# Patient Record
Sex: MALE | Race: WHITE | NOT HISPANIC OR LATINO | URBAN - METROPOLITAN AREA
[De-identification: names, ages, dates, MRNs, and addresses within clinical notes are randomized per-mention and may not be internally consistent; named-entity substitution may affect disease eponyms.]

---

## 2021-01-23 DIAGNOSIS — Z23 ENCOUNTER FOR IMMUNIZATION: ICD-10-CM

## 2021-02-06 ENCOUNTER — IMMUNIZATIONS (OUTPATIENT)
Dept: FAMILY MEDICINE CLINIC | Facility: HOSPITAL | Age: 61
End: 2021-02-06

## 2021-02-06 DIAGNOSIS — Z23 ENCOUNTER FOR IMMUNIZATION: Primary | ICD-10-CM

## 2021-02-06 PROCEDURE — 91301 SARS-COV-2 / COVID-19 MRNA VACCINE (MODERNA) 100 MCG: CPT

## 2021-02-06 PROCEDURE — 0011A SARS-COV-2 / COVID-19 MRNA VACCINE (MODERNA) 100 MCG: CPT

## 2021-03-06 ENCOUNTER — IMMUNIZATIONS (OUTPATIENT)
Dept: FAMILY MEDICINE CLINIC | Facility: HOSPITAL | Age: 61
End: 2021-03-06

## 2021-03-06 DIAGNOSIS — Z23 ENCOUNTER FOR IMMUNIZATION: Primary | ICD-10-CM

## 2021-03-06 PROCEDURE — 91301 SARS-COV-2 / COVID-19 MRNA VACCINE (MODERNA) 100 MCG: CPT

## 2021-03-06 PROCEDURE — 0012A SARS-COV-2 / COVID-19 MRNA VACCINE (MODERNA) 100 MCG: CPT

## 2021-11-01 ENCOUNTER — IMMUNIZATIONS (OUTPATIENT)
Dept: FAMILY MEDICINE CLINIC | Facility: HOSPITAL | Age: 61
End: 2021-11-01

## 2021-11-01 DIAGNOSIS — Z23 ENCOUNTER FOR IMMUNIZATION: Primary | ICD-10-CM

## 2022-08-29 ENCOUNTER — APPOINTMENT (OUTPATIENT)
Dept: LAB | Facility: HOSPITAL | Age: 62
End: 2022-08-29

## 2022-08-29 DIAGNOSIS — Z01.84 IMMUNITY STATUS TESTING: ICD-10-CM

## 2022-08-29 DIAGNOSIS — Z11.1 SCREENING EXAMINATION FOR PULMONARY TUBERCULOSIS: ICD-10-CM

## 2022-08-29 PROCEDURE — 86765 RUBEOLA ANTIBODY: CPT

## 2022-08-29 PROCEDURE — 86762 RUBELLA ANTIBODY: CPT

## 2022-08-29 PROCEDURE — 86735 MUMPS ANTIBODY: CPT

## 2022-08-29 PROCEDURE — 86787 VARICELLA-ZOSTER ANTIBODY: CPT

## 2022-08-29 PROCEDURE — 86480 TB TEST CELL IMMUN MEASURE: CPT

## 2022-08-29 PROCEDURE — 36415 COLL VENOUS BLD VENIPUNCTURE: CPT

## 2022-08-30 LAB
MEV IGG SER QL IA: NORMAL
MUV IGG SER QL IA: NORMAL
RUBV IGG SERPL IA-ACNC: >175 IU/ML
VZV IGG SER QL IA: NORMAL

## 2022-08-31 LAB
GAMMA INTERFERON BACKGROUND BLD IA-ACNC: 0.06 IU/ML
M TB IFN-G BLD-IMP: POSITIVE
M TB IFN-G CD4+ BCKGRND COR BLD-ACNC: 1.34 IU/ML
M TB IFN-G CD4+ BCKGRND COR BLD-ACNC: 1.41 IU/ML
MITOGEN IGNF BCKGRD COR BLD-ACNC: 5.64 IU/ML

## 2022-09-02 ENCOUNTER — APPOINTMENT (OUTPATIENT)
Dept: LAB | Facility: HOSPITAL | Age: 62
End: 2022-09-02

## 2022-09-02 DIAGNOSIS — Z11.1 SCREENING EXAMINATION FOR PULMONARY TUBERCULOSIS: ICD-10-CM

## 2022-09-02 PROCEDURE — 36415 COLL VENOUS BLD VENIPUNCTURE: CPT

## 2022-09-02 PROCEDURE — 86480 TB TEST CELL IMMUN MEASURE: CPT

## 2022-09-06 LAB
GAMMA INTERFERON BACKGROUND BLD IA-ACNC: 0.05 IU/ML
M TB IFN-G BLD-IMP: POSITIVE
M TB IFN-G CD4+ BCKGRND COR BLD-ACNC: 3.12 IU/ML
M TB IFN-G CD4+ BCKGRND COR BLD-ACNC: 3.46 IU/ML
MITOGEN IGNF BCKGRD COR BLD-ACNC: 9.62 IU/ML

## 2022-09-13 ENCOUNTER — OFFICE VISIT (OUTPATIENT)
Dept: URGENT CARE | Facility: CLINIC | Age: 62
End: 2022-09-13
Payer: COMMERCIAL

## 2022-09-13 VITALS
BODY MASS INDEX: 32.47 KG/M2 | RESPIRATION RATE: 18 BRPM | TEMPERATURE: 98 F | SYSTOLIC BLOOD PRESSURE: 116 MMHG | DIASTOLIC BLOOD PRESSURE: 60 MMHG | OXYGEN SATURATION: 99 % | HEIGHT: 73 IN | WEIGHT: 245 LBS | HEART RATE: 67 BPM

## 2022-09-13 DIAGNOSIS — M70.22 OLECRANON BURSITIS OF LEFT ELBOW: Primary | ICD-10-CM

## 2022-09-13 PROCEDURE — 99213 OFFICE O/P EST LOW 20 MIN: CPT | Performed by: PHYSICIAN ASSISTANT

## 2022-09-13 RX ORDER — PREDNISONE 10 MG/1
TABLET ORAL
Qty: 26 TABLET | Refills: 0 | Status: SHIPPED | OUTPATIENT
Start: 2022-09-13

## 2022-09-13 RX ORDER — ASPIRIN 81 MG/1
81 TABLET, CHEWABLE ORAL DAILY
COMMUNITY

## 2022-09-13 RX ORDER — VARENICLINE TARTRATE 0.5 MG/1
1 TABLET, FILM COATED ORAL
COMMUNITY

## 2022-09-13 RX ORDER — RIVAROXABAN 20 MG/1
20 TABLET, FILM COATED ORAL DAILY
COMMUNITY
Start: 2022-07-21

## 2022-09-13 RX ORDER — ALPRAZOLAM 0.25 MG/1
0.25 TABLET ORAL 3 TIMES DAILY
COMMUNITY
Start: 2022-06-16

## 2022-09-13 RX ORDER — OMEPRAZOLE 20 MG/1
20 CAPSULE, DELAYED RELEASE ORAL DAILY
COMMUNITY
Start: 2022-07-21

## 2022-09-13 RX ORDER — ATORVASTATIN CALCIUM 20 MG/1
20 TABLET, FILM COATED ORAL DAILY
COMMUNITY
Start: 2022-07-21

## 2022-09-13 RX ORDER — LISINOPRIL 10 MG/1
10 TABLET ORAL DAILY
COMMUNITY
Start: 2022-07-13

## 2022-09-13 RX ORDER — FERROUS SULFATE 325(65) MG
325 TABLET ORAL
COMMUNITY

## 2022-09-13 RX ORDER — CEPHALEXIN 500 MG/1
500 CAPSULE ORAL EVERY 8 HOURS SCHEDULED
Qty: 21 CAPSULE | Refills: 0 | Status: SHIPPED | OUTPATIENT
Start: 2022-09-13 | End: 2022-09-20

## 2022-09-13 NOTE — PROGRESS NOTES
[facility]    NAME: Ney Randolph is a 60 y o  male  : 1960    MRN: [id number]  DATE: 2022  TIME: 7:54 PM    Assessment and Plan   Olecranon bursitis of left elbow [M70 22]  1  Olecranon bursitis of left elbow  Ambulatory Referral to Orthopedic Surgery    predniSONE 10 mg tablet    cephalexin (KEFLEX) 500 mg capsule       Patient Instructions     Patient Instructions   Keflex as directed  Prednisone as directed  Ace wrap for compression  Follow up with ortho  Chief Complaint     Chief Complaint   Patient presents with    Joint Swelling     Left elbow is swollen since 1030 am, denies pain or injury       History of Present Illness   21-year-old male here with swelling of the left elbow  Started today  There is no pain  No injury  Review of Systems   Review of Systems   Constitutional: Negative for chills and fatigue  Respiratory: Negative for cough and shortness of breath  Cardiovascular: Negative for chest pain  Musculoskeletal: Positive for joint swelling         Current Medications     Current Outpatient Medications:     ALPRAZolam (XANAX) 0 25 mg tablet, Take 0 25 mg by mouth 3 (three) times a day, Disp: , Rfl:     aspirin 81 mg chewable tablet, Chew 81 mg daily, Disp: , Rfl:     atorvastatin (LIPITOR) 20 mg tablet, Take 20 mg by mouth daily, Disp: , Rfl:     cephalexin (KEFLEX) 500 mg capsule, Take 1 capsule (500 mg total) by mouth every 8 (eight) hours for 7 days, Disp: 21 capsule, Rfl: 0    ferrous sulfate 325 (65 Fe) mg tablet, Take 325 mg by mouth daily with breakfast, Disp: , Rfl:     lisinopril (ZESTRIL) 10 mg tablet, Take 10 mg by mouth daily, Disp: , Rfl:     metFORMIN (GLUCOPHAGE) 500 mg tablet, Take 500 mg by mouth 2 (two) times a day, Disp: , Rfl:     omeprazole (PriLOSEC) 20 mg delayed release capsule, Take 20 mg by mouth daily, Disp: , Rfl:     predniSONE 10 mg tablet, Take 3 tabs BID X 2 days, 2 tabs BID X 2 days, 1 tab BID X 2 days, 1 tab daily X 2 days, Disp: 26 tablet, Rfl: 0    Cholecalciferol 125 MCG (5000 UT) TABS, Take by mouth (Patient not taking: Reported on 9/13/2022), Disp: , Rfl:     varenicline (CHANTIX) 0 5 mg tablet, Take 1 mg by mouth (Patient not taking: Reported on 9/13/2022), Disp: , Rfl:     Xarelto 20 MG tablet, Take 20 mg by mouth daily (Patient not taking: Reported on 9/13/2022), Disp: , Rfl:     Current Allergies     Allergies as of 09/13/2022    (No Known Allergies)          The following portions of the patient's history were reviewed and updated as appropriate: allergies, current medications, past family history, past medical history, past social history, past surgical history and problem list    No past medical history on file  No past surgical history on file  No family history on file  Social History     Socioeconomic History    Marital status: /Civil Union     Spouse name: Not on file    Number of children: Not on file    Years of education: Not on file    Highest education level: Not on file   Occupational History    Not on file   Tobacco Use    Smoking status: Current Every Day Smoker     Types: Cigars    Smokeless tobacco: Current User   Substance and Sexual Activity    Alcohol use: Not on file    Drug use: Not on file    Sexual activity: Not on file   Other Topics Concern    Not on file   Social History Narrative    Not on file     Social Determinants of Health     Financial Resource Strain: Not on file   Food Insecurity: Not on file   Transportation Needs: Not on file   Physical Activity: Not on file   Stress: Not on file   Social Connections: Not on file   Intimate Partner Violence: Not on file   Housing Stability: Not on file     Medications have been verified      Objective   /60   Pulse 67   Temp 98 °F (36 7 °C) (Temporal)   Resp 18   Ht 6' 1" (1 854 m)   Wt 111 kg (245 lb)   SpO2 99%   BMI 32 32 kg/m²      Physical Exam   Physical Exam  Vitals and nursing note reviewed  Constitutional:       Appearance: Normal appearance  HENT:      Head: Normocephalic and atraumatic  Cardiovascular:      Rate and Rhythm: Normal rate  Pulses: Normal pulses  Pulmonary:      Effort: Pulmonary effort is normal    Musculoskeletal:      Left elbow: Swelling (The olecranon bursa is swollen  Nontender to palpation  There is A slight amount of redness just superior to the bursa ) present  Normal range of motion  No tenderness  Neurological:      Mental Status: He is alert

## 2022-09-15 ENCOUNTER — HOSPITAL ENCOUNTER (OUTPATIENT)
Dept: RADIOLOGY | Facility: HOSPITAL | Age: 62
Discharge: HOME | End: 2022-09-15

## 2022-09-15 DIAGNOSIS — Z11.1 SCREENING EXAMINATION FOR PULMONARY TUBERCULOSIS: ICD-10-CM

## 2022-09-15 PROCEDURE — 71045 X-RAY EXAM CHEST 1 VIEW: CPT

## 2022-09-16 ENCOUNTER — OFFICE VISIT (OUTPATIENT)
Dept: OBGYN CLINIC | Facility: CLINIC | Age: 62
End: 2022-09-16
Payer: COMMERCIAL

## 2022-09-16 VITALS
DIASTOLIC BLOOD PRESSURE: 73 MMHG | SYSTOLIC BLOOD PRESSURE: 134 MMHG | WEIGHT: 245 LBS | BODY MASS INDEX: 32.47 KG/M2 | HEART RATE: 73 BPM | HEIGHT: 73 IN

## 2022-09-16 DIAGNOSIS — M70.22 OLECRANON BURSITIS OF LEFT ELBOW: Primary | ICD-10-CM

## 2022-09-16 PROCEDURE — 99203 OFFICE O/P NEW LOW 30 MIN: CPT | Performed by: ORTHOPAEDIC SURGERY

## 2022-09-16 PROCEDURE — 20605 DRAIN/INJ JOINT/BURSA W/O US: CPT | Performed by: ORTHOPAEDIC SURGERY

## 2022-09-16 NOTE — PROGRESS NOTES
Assessment/Plan:   Diagnoses and all orders for this visit:    Olecranon bursitis of left elbow  -     Ambulatory Referral to Orthopedic Surgery         Pt was advised that the fullness at his left elbow is due to olecranon bursitis  Patient was offered aspiration of his left elbow today  Patient accepted and under aseptic technique aspiration of left olecranon bursitis was performed yielding 20 cc of blood  Ace wrap was replaced and patient was advised to finish the previously prescribed Keflex and prednisone from his urgent care visit on 9/13/2022  He was advised that the fluid may return and require additional aspiration  We will see him back in 4 weeks for repeat evaluation  Under aseptic technique, the left elbow was aspirated of 20 cc of blood fluid  No signs of infection  He was instructed to avoid any leaning  Return back in 1 month for evaluation  The patient understands there is a high probability the fluid will recur, but hopefully less quantity    Subjective:   Patient ID: Pura Melendez  1960     HPI  Patient is a 60 y o  male who presents for initial evaluation of his left elbow  Patient states that on Tuesday he noted posterior fullness over the olecranon bursa  Patient denies any left elbow injury  Patient denies any pain in his left elbow  Patient denies any previous instance of left elbow fullness  Who is seen in urgent care on 9/13/2022 for he was prescribed Keflex and prednisone and advised to apply an Ace wrap and referred to Orthopedics  Pt takes ASA daily  The following portions of the patient's history were reviewed and updated as appropriate:  Past medical history, past surgical history, Family history, social history, current medications and allergies    History reviewed  No pertinent past medical history  History reviewed  No pertinent surgical history  History reviewed  No pertinent family history      Social History     Socioeconomic History    Marital status: /Civil Union     Spouse name: None    Number of children: None    Years of education: None    Highest education level: None   Occupational History    None   Tobacco Use    Smoking status: Current Every Day Smoker     Types: Cigars    Smokeless tobacco: Current User   Substance and Sexual Activity    Alcohol use: None    Drug use: None    Sexual activity: None   Other Topics Concern    None   Social History Narrative    None     Social Determinants of Health     Financial Resource Strain: Not on file   Food Insecurity: Not on file   Transportation Needs: Not on file   Physical Activity: Not on file   Stress: Not on file   Social Connections: Not on file   Intimate Partner Violence: Not on file   Housing Stability: Not on file         Current Outpatient Medications:     ALPRAZolam (XANAX) 0 25 mg tablet, Take 0 25 mg by mouth 3 (three) times a day, Disp: , Rfl:     aspirin 81 mg chewable tablet, Chew 81 mg daily, Disp: , Rfl:     atorvastatin (LIPITOR) 20 mg tablet, Take 20 mg by mouth daily, Disp: , Rfl:     cephalexin (KEFLEX) 500 mg capsule, Take 1 capsule (500 mg total) by mouth every 8 (eight) hours for 7 days, Disp: 21 capsule, Rfl: 0    ferrous sulfate 325 (65 Fe) mg tablet, Take 325 mg by mouth daily with breakfast, Disp: , Rfl:     lisinopril (ZESTRIL) 10 mg tablet, Take 10 mg by mouth daily, Disp: , Rfl:     metFORMIN (GLUCOPHAGE) 500 mg tablet, Take 500 mg by mouth 2 (two) times a day, Disp: , Rfl:     omeprazole (PriLOSEC) 20 mg delayed release capsule, Take 20 mg by mouth daily, Disp: , Rfl:     predniSONE 10 mg tablet, Take 3 tabs BID X 2 days, 2 tabs BID X 2 days, 1 tab BID X 2 days, 1 tab daily X 2 days, Disp: 26 tablet, Rfl: 0    Cholecalciferol 125 MCG (5000 UT) TABS, Take by mouth (Patient not taking: No sig reported), Disp: , Rfl:     varenicline (CHANTIX) 0 5 mg tablet, Take 1 mg by mouth (Patient not taking: No sig reported), Disp: , Rfl:     Xarelto 20 MG tablet, Take 20 mg by mouth daily (Patient not taking: No sig reported), Disp: , Rfl:     No Known Allergies    Review of Systems   Constitutional: Negative for chills, fever and unexpected weight change  HENT: Negative for hearing loss, nosebleeds and sore throat  Eyes: Negative for pain, redness and visual disturbance  Respiratory: Negative for cough, shortness of breath and wheezing  Cardiovascular: Negative for chest pain, palpitations and leg swelling  Gastrointestinal: Negative for abdominal pain, nausea and vomiting  Endocrine: Negative for polydipsia and polyuria  Genitourinary: Negative for dysuria and hematuria  Skin: Negative for rash and wound  Neurological: Negative for dizziness, light-headedness and headaches  Psychiatric/Behavioral: Negative for decreased concentration, dysphoric mood and suicidal ideas  The patient is not nervous/anxious  Objective:  /73   Pulse 73   Ht 6' 1" (1 854 m)   Wt 111 kg (245 lb)   BMI 32 32 kg/m²     Ortho Exam  Left elbow  Full ROM of left elbow without pain  fullness over the olecranon bursa  no erythema or ecchymosis  no increased palpable warmth         Physical Exam  Vitals and nursing note reviewed  Constitutional:       Appearance: He is well-developed  HENT:      Head: Normocephalic and atraumatic  Eyes:      Conjunctiva/sclera: Conjunctivae normal    Cardiovascular:      Rate and Rhythm: Normal rate and regular rhythm  Heart sounds: No murmur heard  Pulmonary:      Effort: Pulmonary effort is normal  No respiratory distress  Breath sounds: Normal breath sounds  Abdominal:      Palpations: Abdomen is soft  Tenderness: There is no abdominal tenderness  Musculoskeletal:      Cervical back: Neck supple  Skin:     General: Skin is warm and dry  Neurological:      Mental Status: He is alert            Diagnostic Test Review:  None     Medium joint arthrocentesis: L olecranon bursa  [facility] Protocol:  Consent: Verbal consent obtained  Risks and benefits: risks, benefits and alternatives were discussed  Consent given by: patient  Patient understanding: patient states understanding of the procedure being performed  Patient consent: the patient's understanding of the procedure matches consent given  Radiology Images displayed and confirmed   If images not available, report reviewed: imaging studies available    Supporting Documentation  Indications: joint swelling   Procedure Details  Location: elbow - L olecranon bursa  Preparation: Patient was prepped and draped in the usual sterile fashion  Ultrasound guidance: no    Aspirate: bloody    Patient tolerance: patient tolerated the procedure well with no immediate complications  Dressing:  Sterile dressing applied             Scribe Attestation    I,:  Brooklynn Found am acting as a scribe while in the presence of the attending physician :       I,:  Sean Kapoor DO personally performed the services described in this documentation    as scribed in my presence :

## 2022-10-14 ENCOUNTER — OFFICE VISIT (OUTPATIENT)
Dept: OBGYN CLINIC | Facility: CLINIC | Age: 62
End: 2022-10-14
Payer: COMMERCIAL

## 2022-10-14 VITALS
HEIGHT: 73 IN | BODY MASS INDEX: 32.32 KG/M2 | HEART RATE: 84 BPM | DIASTOLIC BLOOD PRESSURE: 81 MMHG | SYSTOLIC BLOOD PRESSURE: 133 MMHG

## 2022-10-14 DIAGNOSIS — M70.22 OLECRANON BURSITIS OF LEFT ELBOW: Primary | ICD-10-CM

## 2022-10-14 PROCEDURE — 99213 OFFICE O/P EST LOW 20 MIN: CPT | Performed by: ORTHOPAEDIC SURGERY

## 2022-10-14 NOTE — PROGRESS NOTES
ASSESSMENT/PLAN:    Diagnoses and all orders for this visit:    Olecranon bursitis of left elbow        The patient is asymptomatic today  The swelling has almost completely resolved  He can perform home exercise program   He will follow up with our office as needed  The patient is acceptable to this plan  Return if symptoms worsen or fail to improve  The olecranon bursitis has resolved  There is full range of motion  Minimal boggy tissue but no fluid present  Continue home exercise program   Avoid leaning on the elbow  Follow up on an as-needed basis  If his condition changes, he will not hesitate to let us know      _____________________________________________________  CHIEF COMPLAINT:  Chief Complaint   Patient presents with   • Left Elbow - Follow-up         SUBJECTIVE:  Pura Melendez is a 60 y o  male who presents to our office for follow-up visit  He has a history of olecranon bursitis of his left elbow  Last visit, the patient's left elbow was aspirated for 20 cc of bloody fluid  Today, he denies any significant swelling or pain along his left elbow  He denies any numbness or tingling  He denies any fever or chills  The following portions of the patient's history were reviewed and updated as appropriate: allergies, current medications, past family history, past medical history, past social history, past surgical history and problem list     PAST MEDICAL HISTORY:  Past Medical History:   Diagnosis Date   • Diabetes mellitus ([address] )    • Heart murmur    • Hypertension        PAST SURGICAL HISTORY:  Past Surgical History:   Procedure Laterality Date   • APPENDECTOMY N/A    • JOINT REPLACEMENT Right    • STOMACH SURGERY         FAMILY HISTORY:  History reviewed  No pertinent family history      SOCIAL HISTORY:  Social History     Tobacco Use   • Smoking status: Current Every Day Smoker     Types: Cigars   • Smokeless tobacco: Current User   Vaping Use   • Vaping Use: Never used   Substance Use Topics   • Alcohol use: Yes     Comment: rarely       MEDICATIONS:    Current Outpatient Medications:   •  ALPRAZolam (XANAX) 0 25 mg tablet, Take 0 25 mg by mouth 3 (three) times a day, Disp: , Rfl:   •  aspirin 81 mg chewable tablet, Chew 81 mg daily, Disp: , Rfl:   •  atorvastatin (LIPITOR) 20 mg tablet, Take 20 mg by mouth daily, Disp: , Rfl:   •  ferrous sulfate 325 (65 Fe) mg tablet, Take 325 mg by mouth daily with breakfast, Disp: , Rfl:   •  lisinopril (ZESTRIL) 10 mg tablet, Take 10 mg by mouth daily, Disp: , Rfl:   •  metFORMIN (GLUCOPHAGE) 500 mg tablet, Take 500 mg by mouth 2 (two) times a day, Disp: , Rfl:   •  omeprazole (PriLOSEC) 20 mg delayed release capsule, Take 20 mg by mouth daily, Disp: , Rfl:   •  predniSONE 10 mg tablet, Take 3 tabs BID X 2 days, 2 tabs BID X 2 days, 1 tab BID X 2 days, 1 tab daily X 2 days, Disp: 26 tablet, Rfl: 0  •  Cholecalciferol 125 MCG (5000 UT) TABS, Take by mouth (Patient not taking: No sig reported), Disp: , Rfl:   •  varenicline (CHANTIX) 0 5 mg tablet, Take 1 mg by mouth (Patient not taking: No sig reported), Disp: , Rfl:   •  Xarelto 20 MG tablet, Take 20 mg by mouth daily (Patient not taking: No sig reported), Disp: , Rfl:     ALLERGIES:  No Known Allergies    ROS:  Review of Systems     Constitutional: Negative for fatigue, fever or loss of appetite  HENT: Negative  Respiratory: Negative for shortness of breath, dyspnea  Cardiovascular: Negative for chest pain/tightness  Gastrointestinal: Negative for abdominal pain, N/V  Endocrine: Negative for cold/heat intolerance, unexplained weight loss/gain  Genitourinary: Negative for flank pain, dysuria, hematuria  Musculoskeletal:  Negative for arthralgia   Skin: Negative for rash  Neurological: Negative for numbness or tingling  Psychiatric/Behavioral: Negative for agitation    _____________________________________________________  PHYSICAL EXAMINATION:    Blood pressure 133/81, pulse 84, height 6' 1" (1 854 m)  Constitutional: Oriented to person, place, and time  Appears well-developed and well-nourished  No distress  HENT:   Head: Normocephalic  Eyes: Conjunctivae are normal  Right eye exhibits no discharge  Left eye exhibits no discharge  No scleral icterus  Cardiovascular: Normal rate  Pulmonary/Chest: Effort normal    Neurological: Alert and oriented to person, place, and time  Skin: Skin is warm and dry  No rash noted  Not diaphoretic  No erythema  No pallor  Psychiatric: Normal mood and affect  Behavior is normal  Judgment and thought content normal       MUSCULOSKELETAL EXAMINATION:   Physical Exam  Ortho Exam    Left upper extremity is neurovascularly intact  Fingers are pink mobile  Compartments are soft  No significant swelling along the olecranon bursa  No warmth erythema  Good range of motion of elbow  Brisk cap refill  Sensation intact  Objective:  BP Readings from Last 1 Encounters:   10/14/22 133/81      Wt Readings from Last 1 Encounters:   09/16/22 111 kg (245 lb)        BMI:   Estimated body mass index is 32 32 kg/m² as calculated from the following:    Height as of this encounter: 6' 1" (1 854 m)  Weight as of 9/16/22: 111 kg (245 lb)            Scribe Attestation    I,:  Milagro Summers PA-C am acting as a scribe while in the presence of the attending physician :       I,:  Daniela Angelo DO personally performed the services described in this documentation    as scribed in my presence :

## 2022-12-05 ENCOUNTER — OFFICE VISIT (OUTPATIENT)
Dept: GASTROENTEROLOGY | Facility: MEDICAL CENTER | Age: 62
End: 2022-12-05

## 2022-12-05 VITALS
BODY MASS INDEX: 32.98 KG/M2 | WEIGHT: 250 LBS | SYSTOLIC BLOOD PRESSURE: 132 MMHG | DIASTOLIC BLOOD PRESSURE: 76 MMHG | HEART RATE: 71 BPM | TEMPERATURE: 97.8 F

## 2022-12-05 DIAGNOSIS — Z80.0 FAMILY HISTORY OF COLON CANCER: ICD-10-CM

## 2022-12-05 DIAGNOSIS — Z72.0 TOBACCO USE: ICD-10-CM

## 2022-12-05 DIAGNOSIS — Z86.010 PERSONAL HISTORY OF COLONIC POLYPS: ICD-10-CM

## 2022-12-05 DIAGNOSIS — K22.0 ACHALASIA: Primary | ICD-10-CM

## 2022-12-05 DIAGNOSIS — K21.9 GASTROESOPHAGEAL REFLUX DISEASE, UNSPECIFIED WHETHER ESOPHAGITIS PRESENT: ICD-10-CM

## 2022-12-05 DIAGNOSIS — I48.0 PAROXYSMAL A-FIB (HCC): ICD-10-CM

## 2022-12-05 PROBLEM — E11.9 DIABETES MELLITUS, TYPE II (HCC): Status: ACTIVE | Noted: 2022-12-05

## 2022-12-05 PROBLEM — Z13.71 BRCA2 NEGATIVE: Status: ACTIVE | Noted: 2017-12-21

## 2022-12-05 RX ORDER — ESOMEPRAZOLE MAGNESIUM 40 MG/1
40 CAPSULE, DELAYED RELEASE ORAL DAILY
Qty: 30 CAPSULE | Refills: 11 | Status: SHIPPED | OUTPATIENT
Start: 2022-12-05

## 2022-12-05 RX ORDER — AZITHROMYCIN 250 MG/1
TABLET, FILM COATED ORAL
COMMUNITY
Start: 2022-09-29

## 2022-12-05 RX ORDER — SODIUM FLUORIDE 6 MG/ML
PASTE, DENTIFRICE DENTAL
COMMUNITY
Start: 2022-11-28

## 2022-12-05 NOTE — PROGRESS NOTES
[facility] - Outpatient Consultation  [facility] 60 y o  male MRN: [id number]  Encounter: [id number]          ASSESSMENT AND PLAN:    62M with history of achalasia since childhood s/p myotomy x2 (last 2018), DM2, HTN, h/o pAF not on [facility] who was referred by his dentist for excessive tooth damage concerning for acid reflux  We discussed given his longstanding achalasia, likely limited management options at this stage but will reassess with EGD  Will likely need manometry as well but will hold on this until EGD has been completed  Given his intermittent epigastric burning, will also escalate acid suppression and he was counseled to not eat close to bed time  1  Achalasia  2  Gastroesophageal reflux disease, unspecified whether esophagitis present  - esomeprazole (NexIUM) 40 MG capsule; Take 1 capsule (40 mg total) by mouth in the morning  Dispense: 30 capsule; Refill: 11  - EGD; Future  - stop bedtime snacking    3  Paroxysmal A-fib (HCC)  Irregularly irregular on exam today, not clear if PVCs or in afib  Patient will check with his cardiologist   Should he resume a blood thinner, he will notify me as this will need to be held before EGD    4  Tobacco use  Quit cigarettes but now using cigars  Applauded his plan to quit 1 1    5  Family history of colon cancer  6  Personal history of colonic polyps  Colonoscopy 3/2021, repeat 2024 per patient records      ______________________________________________________________________    HPI:    Had childhood achalasia  Got myotomy in childhood  Symptoms are typically manageable  Swallowing is never right but can manage  Noticing more gas  Taking omeprazole 20 mg but gets severe epigastric burning at random times  Weight is stable  No dietary restrictions but has to careful with doughy breads  Had redo myotomy in 2018, symptoms were improved for a year but then have been getting progressively worse      Referred by dentist because of 8 teeth needing to be repaired over past 6-12 months  Think reflux may be contributing    Sleeps on wedge pillow  Usually has snack 1 hour before bed  EGD 7/7/2017 fluid and debris filled esophagus, lax LES, gastritis    Review of CareEverywhere records:  Underwent Heller myotomy at age 7  Thoracic surgery visit 3/26/18 with "end-stage achalasia type II with massively dilated and tortuous megaesophagus and with 0% bolus clearance, severe dysphagia, regurgitation and aspiration pneumonia "  Underwent robot assisted cardiomyotomy with partial fundoplication 6/82/26 with Dr Anrde Baker    Mother had gastric cancer  Brother had colon cancer in his 46s  Last colonoscopy 3/5/21, told to repeat in 3 years  Had polyps on his first colonoscopy    Quit smoking cigarettes but now smoking cigars  Planning to quit January  REVIEW OF SYSTEMS:    CONSTITUTIONAL: Denies any fever, chills, rigors, and weight loss  HEENT: No earache or tinnitus  Denies hearing loss or visual disturbances  CARDIOVASCULAR: No chest pain or palpitations  RESPIRATORY: Denies any cough, hemoptysis, shortness of breath or dyspnea on exertion  GASTROINTESTINAL: As noted in the History of Present Illness  GENITOURINARY: No problems with urination  Denies any hematuria or dysuria  NEUROLOGIC: No dizziness or vertigo, denies headaches  MUSCULOSKELETAL: Denies any muscle or joint pain  SKIN: Denies skin rashes or itching  ENDOCRINE: Denies excessive thirst  Denies intolerance to heat or cold  PSYCHOSOCIAL: Denies depression or anxiety  Denies any recent memory loss         Historical Information   Past Medical History:   Diagnosis Date   • Diabetes mellitus ([address] [address] )    • Heart murmur    • Hypertension      Past Surgical History:   Procedure Laterality Date   • APPENDECTOMY N/A    • JOINT REPLACEMENT Right    • STOMACH SURGERY       Social History   Social History     Substance and Sexual Activity   Alcohol Use Yes    Comment: rarely     Social History     Substance and Sexual Activity   Drug Use Not on file     Social History     Tobacco Use   Smoking Status Every Day   • Types: Cigars   Smokeless Tobacco Current     History reviewed  No pertinent family history  Meds/Allergies       Current Outpatient Medications:   •  ALPRAZolam (XANAX) 0 25 mg tablet  •  aspirin 81 mg chewable tablet  •  atorvastatin (LIPITOR) 20 mg tablet  •  azithromycin (ZITHROMAX) 250 mg tablet  •  ferrous sulfate 325 (65 Fe) mg tablet  •  lisinopril (ZESTRIL) 10 mg tablet  •  metFORMIN (GLUCOPHAGE) 500 mg tablet  •  omeprazole (PriLOSEC) 20 mg delayed release capsule  •  predniSONE 10 mg tablet  •  Sodium Fluoride 5000 PPM 1 1 % PSTE  •  Cholecalciferol 125 MCG (5000 UT) TABS  •  varenicline (CHANTIX) 0 5 mg tablet  •  Xarelto 20 MG tablet    No Known Allergies        Objective     Blood pressure 132/76, pulse 71, temperature 97 8 °F (36 6 °C), weight 113 kg (250 lb)  Body mass index is 32 98 kg/m²  PHYSICAL EXAM:      General Appearance:   Alert, cooperative, no distress   HEENT:   Normocephalic, atraumatic, anicteric  Neck:  Supple, symmetrical, trachea midline   Lungs:   Bilateral expiratory wheezing    Heart[de-identified]   Irregularly irregular, distant faint heart sounds but no murmur, rub, or gallop  Abdomen:   Soft, non-tender, non-distended; normal bowel sounds; no masses, no organomegaly    Genitalia:   Deferred    Rectal:   Deferred    Extremities:  No cyanosis, clubbing or edema    Pulses:  2+ and symmetric    Skin:  No jaundice, rashes, or lesions    Lymph nodes:  No palpable cervical lymphadenopathy        Lab Results:   No visits with results within 1 Day(s) from this visit     Latest known visit with results is:   Appointment on 09/02/2022   Component Date Value   • QFT Nil 09/02/2022 0 05    • QFT TB1-NIL 09/02/2022 3 12    • QFT TB2-NIL 09/02/2022 3 46    • QFT Mitogen-NIL 09/02/2022 9 62    • QFT Final Interpretation 09/02/2022 Positive (A)          Radiology Results:   No results found

## 2022-12-05 NOTE — PATIENT INSTRUCTIONS
Scheduled date of EGD (as of today) 1/10/23  Physician performing: Dr Noe Velázquez  Location of procedure:  [facility]  Instructions given to patient:  na  Clearances: na    Patient stated he is not taking the Xarelto

## 2023-01-10 ENCOUNTER — HOSPITAL ENCOUNTER (OUTPATIENT)
Dept: GASTROENTEROLOGY | Facility: HOSPITAL | Age: 63
Setting detail: OUTPATIENT SURGERY
Discharge: HOME | End: 2023-01-10
Attending: STUDENT IN AN ORGANIZED HEALTH CARE EDUCATION/TRAINING PROGRAM

## 2023-01-10 ENCOUNTER — ANESTHESIA (OUTPATIENT)
Dept: GASTROENTEROLOGY | Facility: HOSPITAL | Age: 63
End: 2023-01-10

## 2023-01-10 ENCOUNTER — ANESTHESIA EVENT (OUTPATIENT)
Dept: GASTROENTEROLOGY | Facility: HOSPITAL | Age: 63
End: 2023-01-10

## 2023-01-10 VITALS
RESPIRATION RATE: 18 BRPM | OXYGEN SATURATION: 97 % | HEART RATE: 85 BPM | SYSTOLIC BLOOD PRESSURE: 135 MMHG | DIASTOLIC BLOOD PRESSURE: 87 MMHG | TEMPERATURE: 97.1 F

## 2023-01-10 DIAGNOSIS — K21.9 GASTROESOPHAGEAL REFLUX DISEASE, UNSPECIFIED WHETHER ESOPHAGITIS PRESENT: ICD-10-CM

## 2023-01-10 DIAGNOSIS — K22.0 ACHALASIA: ICD-10-CM

## 2023-01-10 LAB — GLUCOSE SERPL-MCNC: 126 MG/DL (ref 65–140)

## 2023-01-10 RX ORDER — SODIUM CHLORIDE, SODIUM LACTATE, POTASSIUM CHLORIDE, CALCIUM CHLORIDE 600; 310; 30; 20 MG/100ML; MG/100ML; MG/100ML; MG/100ML
20 INJECTION, SOLUTION INTRAVENOUS CONTINUOUS
Status: CANCELLED | OUTPATIENT
Start: 2023-01-10

## 2023-01-10 RX ORDER — PROPOFOL 10 MG/ML
INJECTION, EMULSION INTRAVENOUS AS NEEDED
Status: DISCONTINUED | OUTPATIENT
Start: 2023-01-10 | End: 2023-01-10

## 2023-01-10 RX ORDER — SODIUM CHLORIDE, SODIUM LACTATE, POTASSIUM CHLORIDE, CALCIUM CHLORIDE 600; 310; 30; 20 MG/100ML; MG/100ML; MG/100ML; MG/100ML
125 INJECTION, SOLUTION INTRAVENOUS CONTINUOUS
Status: DISCONTINUED | OUTPATIENT
Start: 2023-01-10 | End: 2023-01-14 | Stop reason: HOSPADM

## 2023-01-10 RX ORDER — SODIUM CHLORIDE, SODIUM LACTATE, POTASSIUM CHLORIDE, CALCIUM CHLORIDE 600; 310; 30; 20 MG/100ML; MG/100ML; MG/100ML; MG/100ML
INJECTION, SOLUTION INTRAVENOUS CONTINUOUS PRN
Status: DISCONTINUED | OUTPATIENT
Start: 2023-01-10 | End: 2023-01-10

## 2023-01-10 RX ADMIN — PROPOFOL 100 MG: 10 INJECTION, EMULSION INTRAVENOUS at 10:37

## 2023-01-10 RX ADMIN — LIDOCAINE HYDROCHLORIDE 80 MG: 20 INJECTION INTRAVENOUS at 10:34

## 2023-01-10 RX ADMIN — SODIUM CHLORIDE, SODIUM LACTATE, POTASSIUM CHLORIDE, AND CALCIUM CHLORIDE: .6; .31; .03; .02 INJECTION, SOLUTION INTRAVENOUS at 10:30

## 2023-01-10 RX ADMIN — PROPOFOL 100 MG: 10 INJECTION, EMULSION INTRAVENOUS at 10:34

## 2023-01-10 RX ADMIN — SODIUM CHLORIDE, SODIUM LACTATE, POTASSIUM CHLORIDE, AND CALCIUM CHLORIDE 125 ML/HR: .6; .31; .03; .02 INJECTION, SOLUTION INTRAVENOUS at 09:42

## 2023-01-10 NOTE — ANESTHESIA POSTPROCEDURE EVALUATION
Post-Op Assessment Note    CV Status:  Stable  Pain Score: 0    Pain management: adequate     Mental Status:  Alert and awake   Hydration Status:  Euvolemic   PONV Controlled:  Controlled   Airway Patency:  Patent      Post Op Vitals Reviewed: Yes      Staff: CRNA         No notable events documented      /73 (01/10/23 1051)    Temp  97   Pulse 87 (01/10/23 1051)   Resp 18 (01/10/23 1051)    SpO2 94 % (01/10/23 1051)

## 2023-01-10 NOTE — H&P
History and Physical - [facility]  [facility] 60 y o  male MRN: [id number]                  HPI: [facility] is a 60y o  year old male who presents for achalasia and GERD      REVIEW OF SYSTEMS: Per the HPI, and otherwise unremarkable  Historical Information   Past Medical History:   Diagnosis Date   • Diabetes mellitus ([address] )    • Heart murmur    • Hypertension      Past Surgical History:   Procedure Laterality Date   • APPENDECTOMY N/A    • JOINT REPLACEMENT Right    • STOMACH SURGERY       Social History   Social History     Substance and Sexual Activity   Alcohol Use Yes    Comment: rarely     Social History     Substance and Sexual Activity   Drug Use Never     Social History     Tobacco Use   Smoking Status Every Day   • Types: Cigars   Smokeless Tobacco Current     History reviewed  No pertinent family history  Meds/Allergies       Current Outpatient Medications:   •  ALPRAZolam (XANAX) 0 25 mg tablet  •  aspirin 81 mg chewable tablet  •  atorvastatin (LIPITOR) 20 mg tablet  •  ferrous sulfate 325 (65 Fe) mg tablet  •  lisinopril (ZESTRIL) 10 mg tablet  •  metFORMIN (GLUCOPHAGE) 500 mg tablet  •  predniSONE 10 mg tablet  •  Cholecalciferol 125 MCG (5000 UT) TABS  •  esomeprazole (NexIUM) 40 MG capsule  •  Sodium Fluoride 5000 PPM 1 1 % PSTE  •  varenicline (CHANTIX) 0 5 mg tablet    Current Facility-Administered Medications:   •  lactated ringers infusion, 125 mL/hr, Intravenous, Continuous, 125 mL/hr at 01/10/23 0942    No Known Allergies    Objective     /62   Pulse 77   Temp (!) 97 1 °F (36 2 °C) (Temporal)   Resp 18   SpO2 97%       PHYSICAL EXAM    Gen: NAD  Head: NCAT  CV: RRR  CHEST: Clear  ABD: soft, NT/ND  EXT: no edema      ASSESSMENT/PLAN:  This is a 60y o  year old male here for EGD, and he is stable and optimized for his procedure

## 2023-01-10 NOTE — ANESTHESIA PREPROCEDURE EVALUATION
Procedure:  EGD    Relevant Problems   ENDO   (+) Diabetes mellitus, type II ([facility] [address] )      NEURO/PSYCH   (+) Personal history of other endocrine, nutritional and metabolic disease        Physical Exam    Airway    Mallampati score: III  TM Distance: >3 FB  Neck ROM: full     Dental   No notable dental hx     Cardiovascular  Cardiovascular exam normal    Pulmonary  Pulmonary exam normal     Other Findings    Achalasia, myomectomy x2 in the past  Has GERD sx so here for EGD for evaluation of that  Pt reports lifetime of problems with dysphagia and delayed gastric transit time  Reports that in previous endoscopy had episode of aspiration  However that time he had had a full meal the evening before the procedure and this time he had only a liquid diet yesterday  Anesthesia Plan  ASA Score- 3     Anesthesia Type- IV sedation with anesthesia with ASA Monitors  Additional Monitors:   Airway Plan:     Comment: Aspiration risk explained to pt  Stated that limiting solid foods the day before may help reduce but not eliminate the risk of aspiration  Pt understanding          Plan Factors-Exercise tolerance (METS): >4 METS  Chart reviewed  Patient summary reviewed  Patient is a current smoker  Patient did not smoke on day of surgery  Induction- intravenous  Postoperative Plan-     Informed Consent- Anesthetic plan and risks discussed with patient  I personally reviewed this patient with the CRNA  Discussed and agreed on the Anesthesia Plan with the ÓSCAR Palmer

## 2023-01-15 NOTE — PROGRESS NOTES
HIM - PROCEDURE RESPONSE NOTE    Based on the query that was sent to you, please document below any procedures that were performed  Patient's esophagus was NOT procedurally dilated  The esophagus is naturally enlarged/dilated due to his medical condition but no procedure was performed to dilate it

## 2023-05-09 ENCOUNTER — HOSPITAL ENCOUNTER (EMERGENCY)
Facility: HOSPITAL | Age: 63
Discharge: HOME | End: 2023-05-09
Attending: EMERGENCY MEDICINE

## 2023-05-09 ENCOUNTER — OFFICE VISIT (OUTPATIENT)
Dept: URGENT CARE | Facility: CLINIC | Age: 63
End: 2023-05-09

## 2023-05-09 ENCOUNTER — APPOINTMENT (EMERGENCY)
Dept: CT IMAGING | Facility: HOSPITAL | Age: 63
End: 2023-05-09

## 2023-05-09 VITALS
WEIGHT: 250 LBS | RESPIRATION RATE: 16 BRPM | HEIGHT: 72 IN | SYSTOLIC BLOOD PRESSURE: 132 MMHG | TEMPERATURE: 97.9 F | DIASTOLIC BLOOD PRESSURE: 74 MMHG | OXYGEN SATURATION: 96 % | BODY MASS INDEX: 33.86 KG/M2 | HEART RATE: 53 BPM

## 2023-05-09 VITALS
RESPIRATION RATE: 18 BRPM | SYSTOLIC BLOOD PRESSURE: 140 MMHG | TEMPERATURE: 97.2 F | DIASTOLIC BLOOD PRESSURE: 80 MMHG | OXYGEN SATURATION: 97 % | HEART RATE: 60 BPM

## 2023-05-09 DIAGNOSIS — K80.50 BILIARY COLIC: Primary | ICD-10-CM

## 2023-05-09 DIAGNOSIS — K22.0 ACHALASIA: ICD-10-CM

## 2023-05-09 DIAGNOSIS — R10.84 GENERALIZED ABDOMINAL PAIN: Primary | ICD-10-CM

## 2023-05-09 LAB
ALBUMIN SERPL BCP-MCNC: 4.2 G/DL (ref 3.5–5)
ALP SERPL-CCNC: 80 U/L (ref 34–104)
ALT SERPL W P-5'-P-CCNC: 24 U/L (ref 7–52)
ANION GAP SERPL CALCULATED.3IONS-SCNC: 9 MMOL/L (ref 4–13)
AST SERPL W P-5'-P-CCNC: 27 U/L (ref 13–39)
BASOPHILS # BLD AUTO: 0.03 THOUSANDS/ÂΜL (ref 0–0.1)
BASOPHILS NFR BLD AUTO: 1 % (ref 0–1)
BILIRUB SERPL-MCNC: 0.54 MG/DL (ref 0.2–1)
BILIRUB UR QL STRIP: NEGATIVE
BUN SERPL-MCNC: 14 MG/DL (ref 5–25)
CALCIUM SERPL-MCNC: 9.6 MG/DL (ref 8.4–10.2)
CARDIAC TROPONIN I PNL SERPL HS: 3 NG/L
CHLORIDE SERPL-SCNC: 97 MMOL/L (ref 96–108)
CLARITY UR: CLEAR
CO2 SERPL-SCNC: 22 MMOL/L (ref 21–32)
COLOR UR: YELLOW
CREAT SERPL-MCNC: 1.02 MG/DL (ref 0.6–1.3)
EOSINOPHIL # BLD AUTO: 0.24 THOUSAND/ÂΜL (ref 0–0.61)
EOSINOPHIL NFR BLD AUTO: 4 % (ref 0–6)
ERYTHROCYTE [DISTWIDTH] IN BLOOD BY AUTOMATED COUNT: 12.8 % (ref 11.6–15.1)
GFR SERPL CREATININE-BSD FRML MDRD: 77 ML/MIN/1.73SQ M
GLUCOSE SERPL-MCNC: 98 MG/DL (ref 65–140)
GLUCOSE UR STRIP-MCNC: NEGATIVE MG/DL
HCT VFR BLD AUTO: 41.2 % (ref 36.5–49.3)
HGB BLD-MCNC: 13.7 G/DL (ref 12–17)
HGB UR QL STRIP.AUTO: NEGATIVE
IMM GRANULOCYTES # BLD AUTO: 0.01 THOUSAND/UL (ref 0–0.2)
IMM GRANULOCYTES NFR BLD AUTO: 0 % (ref 0–2)
KETONES UR STRIP-MCNC: NEGATIVE MG/DL
LEUKOCYTE ESTERASE UR QL STRIP: NEGATIVE
LIPASE SERPL-CCNC: 37 U/L (ref 11–82)
LYMPHOCYTES # BLD AUTO: 1.86 THOUSANDS/ÂΜL (ref 0.6–4.47)
LYMPHOCYTES NFR BLD AUTO: 29 % (ref 14–44)
MCH RBC QN AUTO: 28.5 PG (ref 26.8–34.3)
MCHC RBC AUTO-ENTMCNC: 33.3 G/DL (ref 31.4–37.4)
MCV RBC AUTO: 86 FL (ref 82–98)
MONOCYTES # BLD AUTO: 0.57 THOUSAND/ÂΜL (ref 0.17–1.22)
MONOCYTES NFR BLD AUTO: 9 % (ref 4–12)
NEUTROPHILS # BLD AUTO: 3.79 THOUSANDS/ÂΜL (ref 1.85–7.62)
NEUTS SEG NFR BLD AUTO: 57 % (ref 43–75)
NITRITE UR QL STRIP: NEGATIVE
NRBC BLD AUTO-RTO: 0 /100 WBCS
PH UR STRIP.AUTO: 6 [PH]
PLATELET # BLD AUTO: 250 THOUSANDS/UL (ref 149–390)
PMV BLD AUTO: 10.9 FL (ref 8.9–12.7)
POTASSIUM SERPL-SCNC: 4.3 MMOL/L (ref 3.5–5.3)
PROT SERPL-MCNC: 7.5 G/DL (ref 6.4–8.4)
PROT UR STRIP-MCNC: NEGATIVE MG/DL
RBC # BLD AUTO: 4.81 MILLION/UL (ref 3.88–5.62)
SL AMB  POCT GLUCOSE, UA: NEGATIVE
SL AMB LEUKOCYTE ESTERASE,UA: NEGATIVE
SL AMB POCT BILIRUBIN,UA: NEGATIVE
SL AMB POCT BLOOD,UA: NEGATIVE
SL AMB POCT CLARITY,UA: YELLOW
SL AMB POCT COLOR,UA: CLEAR
SL AMB POCT KETONES,UA: NEGATIVE
SL AMB POCT NITRITE,UA: NEGATIVE
SL AMB POCT PH,UA: 5
SL AMB POCT SPECIFIC GRAVITY,UA: 1
SL AMB POCT URINE PROTEIN: NEGATIVE
SL AMB POCT UROBILINOGEN: 0.2
SODIUM SERPL-SCNC: 128 MMOL/L (ref 135–147)
SP GR UR STRIP.AUTO: <=1.005
UROBILINOGEN UR QL STRIP.AUTO: 0.2 E.U./DL
WBC # BLD AUTO: 6.5 THOUSAND/UL (ref 4.31–10.16)

## 2023-05-09 RX ADMIN — IOHEXOL 100 ML: 350 INJECTION, SOLUTION INTRAVENOUS at 17:54

## 2023-05-09 NOTE — PROGRESS NOTES
"  [facility]        NAME: Funmilayo Christian is a 58 y o  male  : 1960    MRN: [id number]  DATE: May 9, 2023  TIME: 4:15 PM    Assessment and Plan   Generalized abdominal pain [R10 84]  1  Generalized abdominal pain  POCT urine dip    Ambulatory Referral to [facility]    Transfer to other facility      2  Achalasia  Ambulatory Referral to [facility]    Transfer to other facility    history of              Patient Instructions       Follow up with PCP in 3-5 days  Proceed to  ER if symptoms worsen  You are to go to the [address] [place] [address] ED now for higher level of care and evaluation  The [facility] does not offer the level of care you need  You are to NOT eat, drink or void until you are seen in the ED  You area to find a PCP for follow up care      Chief Complaint     Chief Complaint   Patient presents with   • Abdominal Pain     C/o generalized abdominal pain onset \"3 days ago\"  Denies fall/trauma  Denies OTC medications  Denies n/v/d  History of Present Illness       This is a 58year old male who states has a history of achalasia and surgery in [place]  He states that he has been well since  He states however 4 days ago developed mid abdominal pain that is like someone squeezing him  Denies diarrhea, vomiting or nausea  States he is eating and drinking normally  States that he is having small bowel movements yesterday and today  He did not call his surgeon  He has no local PCP as he moved here recently  Denies taking any medications  Denies any trauma  Denies feeling distended  States he is passing gas  Review of Systems   Review of Systems   Constitutional: Negative  HENT: Negative  Eyes: Negative  Respiratory: Negative  Cardiovascular: Negative  Gastrointestinal: Positive for abdominal pain  Negative for constipation, diarrhea, nausea and vomiting  Endocrine: Negative  Genitourinary: Negative  Musculoskeletal: Negative  Skin: Negative    " Allergic/Immunologic: Negative  Neurological: Negative  Hematological: Negative  Psychiatric/Behavioral: Negative  Current Medications       Current Outpatient Medications:   •  ALPRAZolam (XANAX) 0 25 mg tablet, Take 0 25 mg by mouth 3 (three) times a day, Disp: , Rfl:   •  aspirin 81 mg chewable tablet, Chew 81 mg daily, Disp: , Rfl:   •  atorvastatin (LIPITOR) 20 mg tablet, Take 20 mg by mouth daily, Disp: , Rfl:   •  Cholecalciferol 125 MCG (5000 UT) TABS, Take by mouth (Patient not taking: Reported on 9/13/2022), Disp: , Rfl:   •  esomeprazole (NexIUM) 40 MG capsule, TAKE 1 CAPSULE (40 MG TOTAL) BY MOUTH IN THE MORNING, Disp: 90 capsule, Rfl: 3  •  ferrous sulfate 325 (65 Fe) mg tablet, Take 325 mg by mouth daily with breakfast, Disp: , Rfl:   •  lisinopril (ZESTRIL) 10 mg tablet, Take 10 mg by mouth daily, Disp: , Rfl:   •  metFORMIN (GLUCOPHAGE) 500 mg tablet, Take 500 mg by mouth 2 (two) times a day, Disp: , Rfl:   •  predniSONE 10 mg tablet, Take 3 tabs BID X 2 days, 2 tabs BID X 2 days, 1 tab BID X 2 days, 1 tab daily X 2 days, Disp: 26 tablet, Rfl: 0  •  Sodium Fluoride 5000 PPM 1 1 % PSTE, APPLY TO AFFECTED AREA NIGHTLY(NOT COVERED BY INSURANCE), Disp: , Rfl:   •  varenicline (CHANTIX) 0 5 mg tablet, Take 1 mg by mouth, Disp: , Rfl:     Current Allergies     Allergies as of 05/09/2023   • (No Known Allergies)            The following portions of the patient's history were reviewed and updated as appropriate: allergies, current medications, past family history, past medical history, past social history, past surgical history and problem list      Past Medical History:   Diagnosis Date   • Diabetes mellitus ([address] )    • Heart murmur    • Hypertension        Past Surgical History:   Procedure Laterality Date   • APPENDECTOMY N/A    • JOINT REPLACEMENT Right    • STOMACH SURGERY         History reviewed  No pertinent family history  Medications have been verified          Objective   BP 140/80 (BP Location: Right arm, Patient Position: Sitting)   Pulse 60   Temp (!) 97 2 °F (36 2 °C) (Temporal)   Resp 18   SpO2 97%   No LMP for male patient  Physical Exam     Physical Exam  Vitals and nursing note reviewed  Constitutional:       General: He is not in acute distress  Appearance: He is well-developed  He is obese  He is not ill-appearing, toxic-appearing or diaphoretic  HENT:      Head: Normocephalic and atraumatic  Mouth/Throat:      Mouth: Mucous membranes are moist       Pharynx: Oropharynx is clear  No pharyngeal swelling or oropharyngeal exudate  Eyes:      Extraocular Movements: Extraocular movements intact  Pupils: Pupils are equal, round, and reactive to light  Cardiovascular:      Rate and Rhythm: Normal rate and regular rhythm  Heart sounds: Murmur heard  Pulmonary:      Effort: Pulmonary effort is normal  No respiratory distress  Breath sounds: Normal breath sounds  No stridor  No wheezing, rhonchi or rales  Chest:      Chest wall: No tenderness  Abdominal:      General: Bowel sounds are decreased  Palpations: Abdomen is soft  Tenderness: There is generalized abdominal tenderness  Skin:     General: Skin is warm and dry  Capillary Refill: Capillary refill takes less than 2 seconds  Neurological:      General: No focal deficit present  Mental Status: He is alert and oriented to person, place, and time  Psychiatric:         Mood and Affect: Mood normal          Behavior: Behavior normal          Discussed with pt and wife that pt requires a higher level of care than what care now is able to provide  Explained needs CT and labs  He is agreeable to go to the ED for evaluation  DDX:  Bowel obstruction, constipation, hernia, bowel infarction-strangulation, GERD, gastritis

## 2023-05-10 LAB
ATRIAL RATE: 59 BPM
P AXIS: 70 DEGREES
PR INTERVAL: 162 MS
QRS AXIS: 268 DEGREES
QRSD INTERVAL: 134 MS
QT INTERVAL: 456 MS
QTC INTERVAL: 451 MS
T WAVE AXIS: 51 DEGREES
VENTRICULAR RATE: 59 BPM

## 2023-05-10 NOTE — ED PROVIDER NOTES
History  Chief Complaint   Patient presents with   • Abdominal Pain     Pt reports mid abd pain that radiates around both flanks x4 days, worse with movement; denies n/v/d     This is a 79-year-old male who complains of abdominal pain  States is primarily right upper quadrant epigastric and umbilical pain  Intermittent for the last 4 days  Worse when eating  Better with holding certain positions  Associated nausea but no vomiting  Some intermittent constipation which is his baseline  No fevers, no chills  Never had anything like this before  Patient notes he previously had major surgery for achalasia when he was 14years old and has some persistent symptoms since then but they do not typically feel like this  He notes he previously had an appendectomy  He still has his gallbladder  Denies any urinary symptoms  Prior to Admission Medications   Prescriptions Last Dose Informant Patient Reported? Taking?    ALPRAZolam (XANAX) 0 25 mg tablet   Yes No   Sig: Take 0 25 mg by mouth 3 (three) times a day   Cholecalciferol 125 MCG (5000 UT) TABS   Yes No   Sig: Take by mouth   Patient not taking: Reported on 9/13/2022   Sodium Fluoride 5000 PPM 1 1 % PSTE   Yes No   Sig: APPLY TO AFFECTED AREA NIGHTLY(NOT COVERED BY INSURANCE)   aspirin 81 mg chewable tablet   Yes No   Sig: Chew 81 mg daily   atorvastatin (LIPITOR) 20 mg tablet   Yes No   Sig: Take 20 mg by mouth daily   esomeprazole (NexIUM) 40 MG capsule   No No   Sig: TAKE 1 CAPSULE (40 MG TOTAL) BY MOUTH IN THE MORNING   ferrous sulfate 325 (65 Fe) mg tablet   Yes No   Sig: Take 325 mg by mouth daily with breakfast   lisinopril (ZESTRIL) 10 mg tablet   Yes No   Sig: Take 10 mg by mouth daily   metFORMIN (GLUCOPHAGE) 500 mg tablet   Yes No   Sig: Take 500 mg by mouth 2 (two) times a day   predniSONE 10 mg tablet   No No   Sig: Take 3 tabs BID X 2 days, 2 tabs BID X 2 days, 1 tab BID X 2 days, 1 tab daily X 2 days   varenicline (CHANTIX) 0 5 mg tablet Yes No   Sig: Take 1 mg by mouth      Facility-Administered Medications: None       Past Medical History:   Diagnosis Date   • Diabetes mellitus ([facility] [address] )    • Heart murmur    • Hypertension        Past Surgical History:   Procedure Laterality Date   • APPENDECTOMY N/A    • JOINT REPLACEMENT Right    • STOMACH SURGERY         History reviewed  No pertinent family history  I have reviewed and agree with the history as documented  E-Cigarette/Vaping   • E-Cigarette Use Never User      E-Cigarette/Vaping Substances     Social History     Tobacco Use   • Smoking status: Every Day     Types: Cigars   • Smokeless tobacco: Current   Vaping Use   • Vaping Use: Never used   Substance Use Topics   • Alcohol use: Yes     Comment: rarely   • Drug use: Never       Review of Systems   Constitutional: Negative for chills and fever  Eyes: Negative for visual disturbance  Respiratory: Negative for shortness of breath  Cardiovascular: Negative for chest pain  Gastrointestinal: Positive for abdominal pain and nausea  Negative for abdominal distention  Endocrine: Negative for polyuria  Genitourinary: Negative for decreased urine volume and dysuria  Skin: Negative for rash  Neurological: Negative for dizziness, syncope and weakness  Physical Exam  Physical Exam  Constitutional:       General: He is not in acute distress  Appearance: He is well-developed  He is not ill-appearing, toxic-appearing or diaphoretic  HENT:      Head: Normocephalic and atraumatic  Mouth/Throat:      Mouth: Mucous membranes are moist    Eyes:      Conjunctiva/sclera: Conjunctivae normal       Pupils: Pupils are equal, round, and reactive to light  Cardiovascular:      Rate and Rhythm: Normal rate and regular rhythm  Heart sounds: Normal heart sounds  Pulmonary:      Effort: Pulmonary effort is normal  No respiratory distress  Breath sounds: Normal breath sounds     Abdominal:      General: Bowel sounds are normal  Palpations: Abdomen is soft  Tenderness: There is abdominal tenderness in the right upper quadrant, epigastric area and periumbilical area  There is no guarding or rebound  Musculoskeletal:         General: Normal range of motion  Cervical back: Normal range of motion and neck supple  Skin:     General: Skin is warm and dry  Neurological:      Mental Status: He is alert and oriented to person, place, and time  Psychiatric:         Behavior: Behavior normal          Thought Content:  Thought content normal          Judgment: Judgment normal          Vital Signs  ED Triage Vitals [05/09/23 1624]   Temperature Pulse Respirations Blood Pressure SpO2   97 9 °F (36 6 °C) 60 16 119/85 98 %      Temp Source Heart Rate Source Patient Position - Orthostatic VS BP Location FiO2 (%)   Temporal Monitor Sitting Left arm --      Pain Score       2           Vitals:    05/09/23 1624 05/09/23 1730   BP: 119/85 132/74   Pulse: 60 (!) 53   Patient Position - Orthostatic VS: Sitting Sitting         Visual Acuity      ED Medications  Medications   iohexol (OMNIPAQUE) 350 MG/ML injection (SINGLE-DOSE) 100 mL (100 mL Intravenous Given 5/9/23 1754)       Diagnostic Studies  Results Reviewed     Procedure Component Value Units Date/Time    HS Troponin 0hr (reflex protocol) [[id number]]  (Normal) Collected: 05/09/23 1813    Lab Status: Final result Specimen: Blood from Arm, Left Updated: 05/09/23 1842     hs TnI 0hr 3 ng/L     CMP [[id number]]  (Abnormal) Collected: 05/09/23 1650    Lab Status: Final result Specimen: Blood from Arm, Left Updated: 05/09/23 1719     Sodium 128 mmol/L      Potassium 4 3 mmol/L      Chloride 97 mmol/L      CO2 22 mmol/L      ANION GAP 9 mmol/L      BUN 14 mg/dL      Creatinine 1 02 mg/dL      Glucose 98 mg/dL      Calcium 9 6 mg/dL      AST 27 U/L      ALT 24 U/L      Alkaline Phosphatase 80 U/L      Total Protein 7 5 g/dL      Albumin 4 2 g/dL      Total Bilirubin 0 54 mg/dL      eGFR 77 ml/min/1 73sq m     Narrative:      National Kidney Disease Foundation guidelines for Chronic Kidney Disease (CKD):   •  Stage 1 with normal or high GFR (GFR > 90 mL/min/1 73 square meters)  •  Stage 2 Mild CKD (GFR = 60-89 mL/min/1 73 square meters)  •  Stage 3A Moderate CKD (GFR = 45-59 mL/min/1 73 square meters)  •  Stage 3B Moderate CKD (GFR = 30-44 mL/min/1 73 square meters)  •  Stage 4 Severe CKD (GFR = 15-29 mL/min/1 73 square meters)  •  Stage 5 End Stage CKD (GFR <15 mL/min/1 73 square meters)  Note: GFR calculation is accurate only with a steady state creatinine    Lipase [[id number]]  (Normal) Collected: 05/09/23 1650    Lab Status: Final result Specimen: Blood from Arm, Left Updated: 05/09/23 1719     Lipase 37 u/L     UA w Reflex to Microscopic w Reflex to Culture [[id number]]  (Abnormal) Collected: 05/09/23 1658    Lab Status: Final result Specimen: Urine, Clean Catch Updated: 05/09/23 1713     Color, UA Yellow     Clarity, UA Clear     Specific Gravity, UA <=1 005     pH, UA 6 0     Leukocytes, UA Negative     Nitrite, UA Negative     Protein, UA Negative mg/dl      Glucose, UA Negative mg/dl      Ketones, UA Negative mg/dl      Urobilinogen, UA 0 2 E U /dl      Bilirubin, UA Negative     Occult Blood, UA Negative    CBC and differential [[id number]] Collected: 05/09/23 1650    Lab Status: Final result Specimen: Blood from Arm, Left Updated: 05/09/23 1655     WBC 6 50 Thousand/uL      RBC 4 81 Million/uL      Hemoglobin 13 7 g/dL      Hematocrit 41 2 %      MCV 86 fL      MCH 28 5 pg      MCHC 33 3 g/dL      RDW 12 8 %      MPV 10 9 fL      Platelets 595 Thousands/uL      nRBC 0 /100 WBCs      Neutrophils Relative 57 %      Immat GRANS % 0 %      Lymphocytes Relative 29 %      Monocytes Relative 9 %      Eosinophils Relative 4 %      Basophils Relative 1 %      Neutrophils Absolute 3 79 Thousands/µL      Immature Grans Absolute 0 01 Thousand/uL      Lymphocytes Absolute 1 86 Thousands/µL      Monocytes Absolute 0 57 Thousand/µL      Eosinophils Absolute 0 24 Thousand/µL      Basophils Absolute 0 03 Thousands/µL                  CT Abdomen pelvis with contrast   Final Result by Kendra Ag MD (05/09 1920)   1  Cholelithiasis without evidence of cholecystitis  2  Mild bilateral perinephric stranding without hydroureteronephrosis or evidence of obstructive pathology  Correlate with urinalysis  3  Dilated distal esophagus in a patient with history of achalasia and GERD  Workstation performed: [id number]                    Procedures  Procedures         ED Course                               SBIRT 20yo+    Flowsheet Row Most Recent Value   Initial Alcohol Screen: US AUDIT-C     1  How often do you have a drink containing alcohol? 1 Filed at: 05/09/2023 1626   2  How many drinks containing alcohol do you have on a typical day you are drinking? 2 Filed at: 05/09/2023 1626   3a  Male UNDER 65: How often do you have five or more drinks on one occasion? 0 Filed at: 05/09/2023 1626   3b  FEMALE Any Age, or MALE 65+: How often do you have 4 or more drinks on one occassion? 0 Filed at: 05/09/2023 1626   Audit-C Score 3 Filed at: 05/09/2023 1626   AXEL: How many times in the past year have you    Used an illegal drug or used a prescription medication for non-medical reasons? Never Filed at: 05/09/2023 1626                    Medical Decision Making  Is a 69-year-old man who appears to have biliary colic  Case discussed with Dr Edgar Champagne of general surgery  Patient to be seen in the outpatient with close follow-up  Patient tolerating p o  currently in the emergency department and pain is currently minimal   On reevaluation he is not having any tenderness to palpation  He is able to relax comfortably in the bed  Denied needing any painkillers while in the emergency department   Discussed warning signs and symptoms with the patient as well as when to return to the emergency department versus follow up with [facility]  Patient states understanding and agreement with the plan  Patient care delayed due to critical capacity in the emergency department  This note was completed using dictation software  Biliary colic: acute illness or injury  Amount and/or Complexity of Data Reviewed  Labs: ordered  Radiology: ordered  Risk  Prescription drug management  Disposition  Final diagnoses:   Biliary colic     Time reflects when diagnosis was documented in both MDM as applicable and the Disposition within this note     Time User Action Codes Description Comment    5/9/2023  7:42 PM Brooke Tamayo Add [F76 54] Biliary colic       ED Disposition     ED Disposition   Discharge    Condition   Stable    Date/Time   Tue May 9, 2023  7:42 PM    [address] [place] discharge to home/self care                 Follow-up Information     Follow up With Specialties Details Why Contact Info    Delonte Souza MD General Surgery, Surgical Oncology In 1 day  [address]   [phone]            Discharge Medication List as of 5/9/2023  7:43 PM      CONTINUE these medications which have NOT CHANGED    Details   ALPRAZolam (XANAX) 0 25 mg tablet Take 0 25 mg by mouth 3 (three) times a day, Starting Thu 6/16/2022, Historical Med      aspirin 81 mg chewable tablet Chew 81 mg daily, Historical Med      atorvastatin (LIPITOR) 20 mg tablet Take 20 mg by mouth daily, Starting Thu 7/21/2022, Historical Med      Cholecalciferol 125 MCG (5000 UT) TABS Take by mouth, Historical Med      esomeprazole (NexIUM) 40 MG capsule TAKE 1 CAPSULE (40 MG TOTAL) BY MOUTH IN THE MORNING, Starting Tue 12/27/2022, Normal      ferrous sulfate 325 (65 Fe) mg tablet Take 325 mg by mouth daily with breakfast, Historical Med      lisinopril (ZESTRIL) 10 mg tablet Take 10 mg by mouth daily, Starting Wed 7/13/2022, Historical Med      metFORMIN (GLUCOPHAGE) 500 mg tablet Take 500 mg by mouth 2 (two) times a day, Starting Mon 6/20/2022, Historical Med      predniSONE 10 mg tablet Take 3 tabs BID X 2 days, 2 tabs BID X 2 days, 1 tab BID X 2 days, 1 tab daily X 2 days, Normal      Sodium Fluoride 5000 PPM 1 1 % PSTE APPLY TO AFFECTED AREA NIGHTLY(NOT COVERED BY INSURANCE), Historical Med      varenicline (CHANTIX) 0 5 mg tablet Take 1 mg by mouth, Historical Med             No discharge procedures on file      PDMP Review     None          ED Provider  Electronically Signed by           Samuel Arvizu MD  05/09/23 9671

## 2023-05-11 ENCOUNTER — OFFICE VISIT (OUTPATIENT)
Dept: SURGERY | Facility: CLINIC | Age: 63
End: 2023-05-11

## 2023-05-11 ENCOUNTER — TELEPHONE (OUTPATIENT)
Dept: UROLOGY | Facility: CLINIC | Age: 63
End: 2023-05-11

## 2023-05-11 VITALS
RESPIRATION RATE: 16 BRPM | DIASTOLIC BLOOD PRESSURE: 82 MMHG | OXYGEN SATURATION: 97 % | WEIGHT: 253.2 LBS | TEMPERATURE: 97.9 F | BODY MASS INDEX: 34.29 KG/M2 | HEIGHT: 72 IN | SYSTOLIC BLOOD PRESSURE: 132 MMHG | HEART RATE: 73 BPM

## 2023-05-11 DIAGNOSIS — R11.0 NAUSEA: ICD-10-CM

## 2023-05-11 DIAGNOSIS — N12 PYELONEPHRITIS: Primary | ICD-10-CM

## 2023-05-11 DIAGNOSIS — R10.9 BILATERAL FLANK PAIN: ICD-10-CM

## 2023-05-11 DIAGNOSIS — K22.0 ACHALASIA: ICD-10-CM

## 2023-05-11 DIAGNOSIS — K80.20 CHOLELITHIASIS: ICD-10-CM

## 2023-05-11 DIAGNOSIS — E87.1 HYPONATREMIA: ICD-10-CM

## 2023-05-11 RX ORDER — SULFAMETHOXAZOLE AND TRIMETHOPRIM 800; 160 MG/1; MG/1
1 TABLET ORAL EVERY 12 HOURS SCHEDULED
Qty: 28 TABLET | Refills: 0 | Status: SHIPPED | OUTPATIENT
Start: 2023-05-11 | End: 2023-05-25

## 2023-05-11 RX ORDER — CHLORAL HYDRATE 500 MG
1000 CAPSULE ORAL DAILY
COMMUNITY

## 2023-05-11 NOTE — PROGRESS NOTES
Consult - General Surgery   [facility] 58 y o  male MRN: [id number]  Encounter: [id number]    Assessment/Plan     63M with longstanding history of achalasia status post surgical intervention at age 7 and again in 2018 with persistent dilated esophagus and upper GI symptomatology  Now with 3-5 days of slowly escalating pain in the bilateral flanks, unrelated to food, left greater than right, with an intermittent sensation of an entire band of discomfort across the epigastrium and back  Recent ED evaluation only notable for cholelithiasis without acute inflammatory changes of the gallbladder and bilateral left more than right mild to moderate perinephric stranding with mild circumferential bladder thickening, although labs, US, vitals are all relatively unremarkable  He is nontoxic, but somewhat uncomfortable today, particularly with position changes and has new onset nausea  I reviewed his ED workup and imaging and showed the imaging to the patient and his wife  I do not sense that his clinical picture is consistent with biliary colic or cholecystitis based on his history, physical, current symptoms, and workup thus far  Picture is slightly equivocal, he has no UTI symptoms, UA was clean, has no fever or leukocytosis but feels as if his pain symptoms continue to slowly worsen, now with nausea, and moderate left flank pain and tenderness during our visit  I decided to treat him with bactrim for presumptive pylelonephritis, which was sent to his pharmacy  I discussed his case with Dr Kaur Galo from urology who was kind enough to review his imaging and schedule him for a quick turnaround visit with him to see if he feels his symptoms are truly urologic in origin  It is possible that the patient is having a viral syndrome or musculoskeletal pain that is nonspecific and will self resolve as well   For now, he will start the bactrim and follow up with Dr Kaur Galo and I will remain available for additional general surgical opinion or evaluation as appropriate based on the evolution of his symptoms  History of Present Illness   Chief Complaint   Patient presents with   • Abdominal Pain     Patient with about 5 days of slowly escalating upper abdominal, bilateral flank, and back pain  Left flank more than right and into the back  He is uncomfortable and it is slowly worsening, worse with certain position changes and movement at times  Not worse with eating  Now with some nausea this morning  Prior to today did not associate the pain with food at all, has been eating, urinating, moving his bowels normally  Denies dysuria, hematuria, fevers, chills, sweats, sick contacts  Admits to nocturia, but says he voids an adequate amount with each void, denies history of kidney stones, urologic procedures, UTIs  Recent urgent care and ED visit in last 2-3 days for these symptoms  Labs relatively unremarkable, other than sodium 128, LFTs normal, lipase normal, WBC normal, UA normal  CT reviewed in detail, shows gallstones, otherwise normal gallbladder, pancreas, biliary tree  I notice some mild thickening of the bladder with some distention and bilateral left more than right perinephric stranding  Does note some increased activity with his son about 2 weeks ago fixing a car and that these symptoms started about 4 days later, so he was initially considering a musculoskeletal back strain as the cause of his symptoms but they have continued to evolve and remain atypical     History of complex achalasia requiring intervention via a large left flank incision at age 7 and again in 2018 for redo myotomy via a robotic approach  Also status post open appendectomy  Mother with gastric cancer, brother with colon cancer, sister with bladder cancer  Recent EGD with Dr Judith Apgar reviewed shows dilated esophagus, last colonoscopy , showed colon polyps, due in   Review of Systems   Constitutional: Positive for activity change   Negative for chills, diaphoresis, fatigue and fever  Gastrointestinal: Positive for abdominal pain and nausea  Negative for abdominal distention, anal bleeding, blood in stool, constipation, diarrhea and vomiting  Genitourinary: Positive for flank pain  Negative for dysuria and hematuria  Musculoskeletal: Positive for back pain  All other systems reviewed and are negative  Historical Information   Past Medical History:   Diagnosis Date   • Diabetes mellitus ([facility] [address] )    • Heart murmur    • Hypertension      Past Surgical History:   Procedure Laterality Date   • APPENDECTOMY N/A    • JOINT REPLACEMENT Right    • STOMACH SURGERY       Social History   Social History     Substance and Sexual Activity   Alcohol Use Yes    Comment: rarely     Social History     Substance and Sexual Activity   Drug Use Never     Social History     Tobacco Use   Smoking Status Every Day   • Types: Cigars   Smokeless Tobacco Current     History reviewed  No pertinent family history      Meds/Allergies     Current Outpatient Medications:   •  aspirin 81 mg chewable tablet, Chew 81 mg daily, Disp: , Rfl:   •  atorvastatin (LIPITOR) 20 mg tablet, Take 20 mg by mouth daily, Disp: , Rfl:   •  esomeprazole (NexIUM) 40 MG capsule, TAKE 1 CAPSULE (40 MG TOTAL) BY MOUTH IN THE MORNING, Disp: 90 capsule, Rfl: 3  •  lisinopril (ZESTRIL) 10 mg tablet, Take 10 mg by mouth daily, Disp: , Rfl:   •  metFORMIN (GLUCOPHAGE) 500 mg tablet, Take 500 mg by mouth 2 (two) times a day, Disp: , Rfl:   •  Omega-3 Fatty Acids (fish oil) 1,000 mg, Take 1,000 mg by mouth daily, Disp: , Rfl:   •  Sodium Fluoride 5000 PPM 1 1 % PSTE, APPLY TO AFFECTED AREA NIGHTLY(NOT COVERED BY INSURANCE), Disp: , Rfl:   •  sulfamethoxazole-trimethoprim (BACTRIM DS) 800-160 mg per tablet, Take 1 tablet by mouth every 12 (twelve) hours for 14 days, Disp: 28 tablet, Rfl: 0  •  varenicline (CHANTIX) 0 5 mg tablet, Take 1 mg by mouth, Disp: , Rfl:   •  ALPRAZolam (XANAX) 0 25 mg tablet, Take 0 25 mg by mouth 3 (three) times a day (Patient not taking: Reported on 5/11/2023), Disp: , Rfl:   •  Cholecalciferol 125 MCG (5000 UT) TABS, Take by mouth (Patient not taking: Reported on 9/13/2022), Disp: , Rfl:   •  ferrous sulfate 325 (65 Fe) mg tablet, Take 325 mg by mouth daily with breakfast (Patient not taking: Reported on 5/11/2023), Disp: , Rfl:   •  predniSONE 10 mg tablet, Take 3 tabs BID X 2 days, 2 tabs BID X 2 days, 1 tab BID X 2 days, 1 tab daily X 2 days (Patient not taking: Reported on 5/11/2023), Disp: 26 tablet, Rfl: 0  No Known Allergies    The following portions of the patient's history were reviewed and updated as appropriate: allergies, current medications, past family history, past medical history, past social history, past surgical history and problem list     Objective   Current Vitals:   Blood pressure 132/82, pulse 73, temperature 97 9 °F (36 6 °C), temperature source Temporal, resp  rate 16, height 6' (1 829 m), weight 115 kg (253 lb 3 2 oz), SpO2 97 %  Physical Exam  Vitals reviewed  Constitutional:       General: He is not in acute distress  Appearance: He is not ill-appearing or toxic-appearing  Comments: Appears uncomfortable but nontoxic   HENT:      Head: Normocephalic and atraumatic  Cardiovascular:      Rate and Rhythm: Normal rate  Pulmonary:      Effort: Pulmonary effort is normal  No respiratory distress  Abdominal:      General: A surgical scar is present  Palpations: Abdomen is soft  Comments: Left flank incision, right lower quadrant incision well healed    Abdomen soft nondistended, no Conner's sign, minimal tenderness to palpation nonspecifically across the upper abdomen, more so tender to palpation bilateral mid and lower back and flanks, left more than right   Skin:     General: Skin is warm and dry  Capillary Refill: Capillary refill takes less than 2 seconds  Neurological:      General: No focal deficit present        Mental Status: He is alert  Psychiatric:         Mood and Affect: Mood normal          Signature:   Steven Alejandre MD  Date: 5/11/2023 Time: 12:18 PM

## 2023-05-11 NOTE — TELEPHONE ENCOUNTER
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

## 2023-05-12 ENCOUNTER — OFFICE VISIT (OUTPATIENT)
Dept: UROLOGY | Facility: CLINIC | Age: 63
End: 2023-05-12

## 2023-05-12 VITALS
HEIGHT: 72 IN | SYSTOLIC BLOOD PRESSURE: 120 MMHG | BODY MASS INDEX: 34.27 KG/M2 | DIASTOLIC BLOOD PRESSURE: 60 MMHG | HEART RATE: 80 BPM | WEIGHT: 253 LBS | OXYGEN SATURATION: 98 %

## 2023-05-12 DIAGNOSIS — R63.1 POLYDIPSIA: ICD-10-CM

## 2023-05-12 DIAGNOSIS — R10.9 FLANK PAIN: ICD-10-CM

## 2023-05-12 DIAGNOSIS — E87.1 HYPONATREMIA: ICD-10-CM

## 2023-05-12 DIAGNOSIS — N12 PYELONEPHRITIS: ICD-10-CM

## 2023-05-12 DIAGNOSIS — Z12.5 PROSTATE CANCER SCREENING: Primary | ICD-10-CM

## 2023-05-12 DIAGNOSIS — E11.9 TYPE 2 DIABETES MELLITUS WITHOUT COMPLICATION, WITHOUT LONG-TERM CURRENT USE OF INSULIN (HCC): ICD-10-CM

## 2023-05-12 NOTE — PROGRESS NOTES
[address] [facility]  [facility]  [address] [place]  [place], [address] [facility]  [phone]  www  [email or web address]  [email or web address]      NAME: Betty Nicholas  AGE: 58 y o  SEX: male  : 1960   MRN: [id number]    DATE: 2023  TIME: 3:47 PM    Assessment and Plan:    Migratory pain, bilateral mid axillary lines alternates between right and left, today it is more left posterior thorax  CT scan shows no evidence of pyelonephritis, just perinephric stranding which may be scar tissue  I have no evidence for any sort of infection  His bladder wall is a little thickened and there is concerned with cigarette smoking but he has no blood in his urine  He has urinary frequency and nocturia because he drinks an excessive amount of tea  I am also concerned that this may be causing his hyponatremia as he has a low specific gravity to his urine so he may have a form of water intoxication  He was advised to cut his tea consumption in half  He is currently on Bactrim and I told him to go ahead and take that full course just to see if it makes any difference  I am hoping that this all just simply goes away  From the positional nature of it, and it seems more musculoskeletal     1 other aspect to this pain is that although he does not have any specific central spine pain, he notes that if you push on 1 side of his flank it hurts on the other side  That to me suggests a possible nerve root issue in causing movement of the vertebral bodies  This is a very unusual presentation and I really do not have any easy answers at this time  1 could consider getting an MRI of the thoracolumbar spine  He will continue with the Bactrim and see how things go  If he is no better we can pursue those options and I encouraged him to link up with a primary care physician  Kiesha Kuhn to see if he will help out                 Chief Complaint     Chief Complaint   Patient presents with   • Phyleonephritis       History of Present Illness   58year-old new patient-I was notified by Dr Rogelio Arteaga that she was seeing him for possible biliary reasons with abdominal pain  He is the father of one of our obstetrician gynecologist   He was seen in emergency department May 9, 2023 with abdominal pain it was mid abdominal pain radiating around both flanks for 4 days worse with movement  It was worse with eating and better with holding certain positions  There is nausea but no vomiting  Some intermittent constipation which is his baseline  Never had had this before  The pain starts in the mid axillary line on each side and then radiates mostly around to the back  He had major surgery for achalasia when he was 14years old and he had a flank incision  I personally reviewed his CT scan that was done  His kidneys are normal with the exception of some perinephric stranding  I do not see any signs in the parenchyma itself of pyelonephritis and there were no stones or hydronephrosis  The bladder is normal although somewhat thick-walled  Urinalysis showed a low specific gravity but otherwise completely normal   He is chronically hyponatremic and his sodium was down to 128 on that day and his creatinine was normal at 1 02  Potassium and chloride were normal   Glucose 98  He has a large left flank incision going all the way from his thoracic spine down over his 11th and 12th rib and to his anterior abdomen  He smokes cigarettes  He does have urinary frequency of every hour an hour and a half, and he gets up twice a night to urinate  No difficulty urinating  No gross hematuria  No dysuria  No bowel complaints  He drinks about a gallon and a half of iced tea per day  He puts equal in it  He does not use sugar  He is not bothered by the caffeine  He is able to sleep okay      The following portions of the patient's history were reviewed and updated as appropriate: allergies, current medications, past family history, past medical history, past social history, past surgical history and problem list   Past Medical History:   Diagnosis Date   • Diabetes mellitus ([facility] [address] )    • Heart murmur    • Hypertension      Past Surgical History:   Procedure Laterality Date   • APPENDECTOMY N/A    • JOINT REPLACEMENT Right    • STOMACH SURGERY       shoulder  Review of Systems   Review of Systems   Constitutional: Negative for fever  Respiratory: Negative for shortness of breath  Cardiovascular: Negative for chest pain  Gastrointestinal: Negative  Genitourinary: Positive for flank pain  Negative for difficulty urinating, dysuria, penile pain, penile swelling and scrotal swelling  As per HPI       Active Problem List     Patient Active Problem List   Diagnosis   • Achalasia   • BRCA2 negative   • Diabetes mellitus, type II ([facility] [address] )   • Personal history of other endocrine, nutritional and metabolic disease   • Pyelonephritis   • Bilateral flank pain   • Cholelithiasis   • Nausea   • Hyponatremia       Objective   /60 (BP Location: Left arm, Patient Position: Sitting, Cuff Size: Large)   Pulse 80   Ht 6' (1 829 m)   Wt 115 kg (253 lb)   SpO2 98%   BMI 34 31 kg/m²     Physical Exam  Vitals reviewed  Constitutional:       Appearance: Normal appearance  HENT:      Head: Normocephalic and atraumatic  Eyes:      Extraocular Movements: Extraocular movements intact  Pulmonary:      Effort: Pulmonary effort is normal    Abdominal:      General: There is no distension  Palpations: Abdomen is soft  There is no mass  Tenderness: There is no abdominal tenderness  There is no right CVA tenderness, left CVA tenderness, guarding or rebound  Hernia: No hernia is present        Comments: Large scar left thoracic spine all the way around the flank to the abdomen as described, no flank hernia   Genitourinary:     Penis: Normal        Testes: Normal       Prostate: Normal       Rectum: Normal  Comments: Normal circumcised phallus, testicles are descended bilaterally and are within normal limits, no inguinal hernia, rectal exam feels normal tone no masses and his prostate is about 30 g, smooth symmetric and benign  No nodules  Musculoskeletal:         General: Normal range of motion  Cervical back: Normal range of motion  Skin:     Coloration: Skin is not jaundiced or pale  Neurological:      General: No focal deficit present  Mental Status: He is alert and oriented to person, place, and time  Psychiatric:         Mood and Affect: Mood normal          Behavior: Behavior normal          Thought Content:  Thought content normal          Judgment: Judgment normal              Current Medications     Current Outpatient Medications:   •  aspirin 81 mg chewable tablet, Chew 81 mg daily, Disp: , Rfl:   •  atorvastatin (LIPITOR) 20 mg tablet, Take 20 mg by mouth daily, Disp: , Rfl:   •  esomeprazole (NexIUM) 40 MG capsule, TAKE 1 CAPSULE (40 MG TOTAL) BY MOUTH IN THE MORNING, Disp: 90 capsule, Rfl: 3  •  lisinopril (ZESTRIL) 10 mg tablet, Take 10 mg by mouth daily, Disp: , Rfl:   •  metFORMIN (GLUCOPHAGE) 500 mg tablet, Take 500 mg by mouth 2 (two) times a day, Disp: , Rfl:   •  Omega-3 Fatty Acids (fish oil) 1,000 mg, Take 1,000 mg by mouth daily, Disp: , Rfl:   •  Sodium Fluoride 5000 PPM 1 1 % PSTE, APPLY TO AFFECTED AREA NIGHTLY(NOT COVERED BY INSURANCE), Disp: , Rfl:   •  sulfamethoxazole-trimethoprim (BACTRIM DS) 800-160 mg per tablet, Take 1 tablet by mouth every 12 (twelve) hours for 14 days, Disp: 28 tablet, Rfl: 0  •  varenicline (CHANTIX) 0 5 mg tablet, Take 1 mg by mouth, Disp: , Rfl:   •  ALPRAZolam (XANAX) 0 25 mg tablet, Take 0 25 mg by mouth 3 (three) times a day, Disp: , Rfl:   •  Cholecalciferol 125 MCG (5000 UT) TABS, Take by mouth, Disp: , Rfl:   •  ferrous sulfate 325 (65 Fe) mg tablet, Take 325 mg by mouth daily with breakfast, Disp: , Rfl:   •  predniSONE 10 mg tablet, Take 3 tabs BID X 2 days, 2 tabs BID X 2 days, 1 tab BID X 2 days, 1 tab daily X 2 days, Disp: 26 tablet, Rfl: 0        oYssi Conley MD

## 2023-05-15 ENCOUNTER — OFFICE VISIT (OUTPATIENT)
Dept: FAMILY MEDICINE CLINIC | Facility: CLINIC | Age: 63
End: 2023-05-15

## 2023-05-15 VITALS
BODY MASS INDEX: 33.72 KG/M2 | OXYGEN SATURATION: 95 % | HEIGHT: 72 IN | HEART RATE: 59 BPM | SYSTOLIC BLOOD PRESSURE: 115 MMHG | TEMPERATURE: 97.8 F | DIASTOLIC BLOOD PRESSURE: 62 MMHG | WEIGHT: 249 LBS

## 2023-05-15 DIAGNOSIS — I48.0 PAROXYSMAL A-FIB (HCC): Primary | ICD-10-CM

## 2023-05-15 DIAGNOSIS — E87.1 HYPONATREMIA: ICD-10-CM

## 2023-05-15 DIAGNOSIS — E11.9 TYPE 2 DIABETES MELLITUS WITHOUT COMPLICATION, WITHOUT LONG-TERM CURRENT USE OF INSULIN (HCC): ICD-10-CM

## 2023-05-15 DIAGNOSIS — K22.0 ACHALASIA: ICD-10-CM

## 2023-05-15 LAB
CREAT UR-MCNC: 140 MG/DL
MICROALBUMIN UR-MCNC: 195 MG/L (ref 0–20)
MICROALBUMIN/CREAT 24H UR: 139 MG/G CREATININE (ref 0–30)
SL AMB POCT HEMOGLOBIN AIC: 6.5 (ref ?–6.5)

## 2023-05-15 NOTE — PROGRESS NOTES
"Assessment/Plan:    Problem List Items Addressed This Visit        Digestive    Achalasia       Endocrine    Diabetes mellitus, type II ([facility] [address] )    Relevant Orders    POCT hemoglobin A1c (Completed)    Albumin / creatinine urine ratio       Cardiovascular and Mediastinum    Paroxysmal A-fib ([facility] [address] ) - Primary       Other    Hyponatremia     We had a long discussion about the etiology of his hyponatremia  Although he drinks a lot of iced tea, I would think it would take more than a gallon and a half of fluid per day to produce hyponatremia (and usually also a very limited take of dietary solute and protein)  Suspect that he may have a reset osmostat  We do need to rule out SIADH though he did have a dilute urine in the setting of hyponatremia  We will check urine and serum osmolalities and repeat his basic metabolic panel along with a urinary sodium  He has already cut back his intake of iced tea anyway  Consider nephrology referral as well  Relevant Orders    Osmolality, urine    Osmolality-\"If this is regarding a toxic alcohol, STOP  Test is not routinely indicated  Please consult medical  on call for further guidance  \"    Sodium, urine, random    Basic metabolic panel       Chief Complaint    Establish Care; Flank Pain; Care Gap Request         HPI:    Ana Scott is a 58 y o  male referred here by Dr Solon Gilford for evaluation of hyponatremia  He presented to the [address] [place] [address] emergency department on May 9 with abdominal discomfort and bilateral flank pain which was actually worse with movement  There was no associated nausea vomiting or fever  A urinalysis was only remarkable for a specific gravity of less than 1 005  CT of the abdomen pelvis with contrast revealed cholelithiasis without cholecystitis, mild bilateral perinephric stranding without hydroureteralnephrosis or evidence of obstructive pathology    He had a dilated distal esophagus consistent with his prior history of " achalasia  He was discharged to home and was referred to general surgery (Dr Nimo Rubio)  She did not think he had acute cholecystitis but elected to put him on Bactrim for presumptive pyelonephritis because of the perinephric stranding  He was then evaluated by Dr Wilton Lopez who did not feel he had pyelonephritis  However, Wilton Lopez noted that he had a sodium of 128 when he was in the emergency department  He got the history that he has been drinking a gallon and a half of iced tea per day  On review of his labs available through care everywhere, it appears that he had hyponatremia going back at least to 2018 at [facility]  He was not aware of having had hyponatremia in the past  Review of serial labs at [facility] revealed:    Date Sodium   12/12/19 134   12/11/19 132   5/9/18 133   5/8/18 133   3/26/18 133     Of note, he does not have a history of severe hypertriglyceridemia (his wife pulled up his last lipid panel from a primary care visit)  His blood sugar in the ED was normal though he does have a history of type 2 diabetes mellitus  His total protein level was also normal   He is not on any medications known to produce hyponatremia (specifically there is no SSRI on his med list)      Past Medical History:   Diagnosis Date   • Diabetes mellitus ([facility] [address] )    • Heart murmur    • Hypertension         Past Surgical History:   Procedure Laterality Date   • APPENDECTOMY N/A    • JOINT REPLACEMENT Right    • MYOTOMY HELLER LAPAROSCOPIC  2018    [place]   • STOMACH SURGERY          Family History   Problem Relation Age of Onset   • Cancer Mother         stomach   • Heart attack Father    • Cancer Sister         bladder   • Cancer Brother         colon        Social History     Socioeconomic History   • Marital status: /Civil Union     Spouse name: Not on file   • Number of children: Not on file   • Years of education: Not on file   • Highest education level: Not on file   Occupational History   • Not on file Tobacco Use   • Smoking status: Every Day     Types: Cigars   • Smokeless tobacco: Current   Vaping Use   • Vaping Use: Never used   Substance and Sexual Activity   • Alcohol use: Yes     Comment: rarely   • Drug use: Never   • Sexual activity: Not Currently   Other Topics Concern   • Not on file   Social History Narrative   • Not on file     Social Determinants of Health     Financial Resource Strain: Not on file   Food Insecurity: Not on file   Transportation Needs: Not on file   Physical Activity: Not on file   Stress: Not on file   Social Connections: Not on file   Intimate Partner Violence: Not on file   Housing Stability: Not on file        Current Outpatient Medications   Medication Sig Dispense Refill   • aspirin 81 mg chewable tablet Chew 81 mg daily     • atorvastatin (LIPITOR) 20 mg tablet Take 20 mg by mouth daily     • esomeprazole (NexIUM) 40 MG capsule TAKE 1 CAPSULE (40 MG TOTAL) BY MOUTH IN THE MORNING 90 capsule 3   • lisinopril (ZESTRIL) 10 mg tablet Take 10 mg by mouth daily     • metFORMIN (GLUCOPHAGE) 500 mg tablet Take 500 mg by mouth 2 (two) times a day     • Omega-3 Fatty Acids (fish oil) 1,000 mg Take 1,000 mg by mouth daily     • Sodium Fluoride 5000 PPM 1 1 % PSTE APPLY TO AFFECTED AREA NIGHTLY(NOT COVERED BY INSURANCE)     • sulfamethoxazole-trimethoprim (BACTRIM DS) 800-160 mg per tablet Take 1 tablet by mouth every 12 (twelve) hours for 14 days 28 tablet 0   • varenicline (CHANTIX) 0 5 mg tablet Take 1 mg by mouth       No current facility-administered medications for this visit  No Known Allergies    Review of Systems    /62 (BP Location: Left arm, Patient Position: Sitting, Cuff Size: Large)   Pulse 59   Temp 97 8 °F (36 6 °C)   Ht 6' (1 829 m)   Wt 113 kg (249 lb)   SpO2 95%   BMI 33 77 kg/m²     General:  Well-developed, well-nourished, in no acute distress  Skin:  Warm, moist, no significant lesions    HENT:  Normocephalic, atraumatic, tympanic membranes clear bilaterally, ear canals unremarkable bilaterally, nasal mucosa without lesions, oropharynx was clear without exudate  Eyes: PERRL, EOMI, conjunctivae normal   Neck:  No thyromegaly, thyroid nodules or cervical lymphadenopathy  Cardiac:  Regular rate and rhythm, no murmur, gallop, or rub  There is no JVD or HJR  Lungs:  Clear to auscultation and percussion  Abdomen:  Soft, nontender, normoactive bowel sounds, no palpable masses, no hepatosplenomegaly  Musculoskeletal:  No clubbing, cyanosis, or edema  Psychiatric:  Mood bright, appropriate affect and insight

## 2023-05-15 NOTE — ASSESSMENT & PLAN NOTE
We had a long discussion about the etiology of his hyponatremia  Although he drinks a lot of iced tea, I would think it would take more than a gallon and a half of fluid per day to produce hyponatremia (and usually also a very limited take of dietary solute and protein)  Suspect that he may have a reset osmostat  We do need to rule out SIADH though he did have a dilute urine in the setting of hyponatremia  We will check urine and serum osmolalities and repeat his basic metabolic panel along with a urinary sodium  He has already cut back his intake of iced tea anyway  Consider nephrology referral as well

## 2023-05-15 NOTE — Clinical Note
Thanks for the referral  It looks the hyponatremia goes back at least 5 years  Suspect that he may have either a reset osmostat or some mild SIADH which may have been exacerbated by acute pain  I'm getting some mat serum and urine studies to further assess him    Consider nephrology referral   Thank you for your referral   Iveth Posey

## 2023-05-16 ENCOUNTER — APPOINTMENT (OUTPATIENT)
Dept: LAB | Facility: CLINIC | Age: 63
End: 2023-05-16

## 2023-05-16 DIAGNOSIS — Z12.5 PROSTATE CANCER SCREENING: ICD-10-CM

## 2023-05-16 DIAGNOSIS — E87.1 HYPONATREMIA: ICD-10-CM

## 2023-05-16 LAB
ANION GAP SERPL CALCULATED.3IONS-SCNC: 6 MMOL/L (ref 4–13)
BUN SERPL-MCNC: 17 MG/DL (ref 5–25)
CALCIUM SERPL-MCNC: 9.2 MG/DL (ref 8.3–10.1)
CHLORIDE SERPL-SCNC: 100 MMOL/L (ref 96–108)
CO2 SERPL-SCNC: 20 MMOL/L (ref 21–32)
CREAT SERPL-MCNC: 1.51 MG/DL (ref 0.6–1.3)
GFR SERPL CREATININE-BSD FRML MDRD: 48 ML/MIN/1.73SQ M
GLUCOSE P FAST SERPL-MCNC: 148 MG/DL (ref 65–99)
POTASSIUM SERPL-SCNC: 4.5 MMOL/L (ref 3.5–5.3)
PSA SERPL-MCNC: 0.3 NG/ML (ref 0–4)
SODIUM SERPL-SCNC: 126 MMOL/L (ref 135–147)

## 2023-05-17 ENCOUNTER — TELEPHONE (OUTPATIENT)
Dept: UROLOGY | Facility: CLINIC | Age: 63
End: 2023-05-17

## 2023-05-17 NOTE — TELEPHONE ENCOUNTER
Called patient to let him know that his PSA came back normal at 0 3  Patient confirmed understanding and had no further questions            ----- Message from Natalie Valadez MD sent at 5/17/2023  1:17 PM EDT -----  Please let him know that his PSA is low and normal at 0 3

## 2023-05-18 ENCOUNTER — TELEPHONE (OUTPATIENT)
Dept: FAMILY MEDICINE CLINIC | Facility: CLINIC | Age: 63
End: 2023-05-18

## 2023-05-18 ENCOUNTER — APPOINTMENT (OUTPATIENT)
Dept: LAB | Facility: CLINIC | Age: 63
End: 2023-05-18

## 2023-05-18 DIAGNOSIS — E87.1 HYPONATREMIA: Primary | ICD-10-CM

## 2023-05-18 DIAGNOSIS — E87.1 HYPONATREMIA: ICD-10-CM

## 2023-05-18 LAB
ANION GAP SERPL CALCULATED.3IONS-SCNC: 6 MMOL/L (ref 4–13)
BUN SERPL-MCNC: 22 MG/DL (ref 5–25)
CALCIUM SERPL-MCNC: 9.6 MG/DL (ref 8.3–10.1)
CHLORIDE SERPL-SCNC: 97 MMOL/L (ref 96–108)
CO2 SERPL-SCNC: 21 MMOL/L (ref 21–32)
CREAT SERPL-MCNC: 1.54 MG/DL (ref 0.6–1.3)
GFR SERPL CREATININE-BSD FRML MDRD: 46 ML/MIN/1.73SQ M
GLUCOSE P FAST SERPL-MCNC: 90 MG/DL (ref 65–99)
OSMOLALITY UR: 579 MMOL/KG
POTASSIUM SERPL-SCNC: 4.7 MMOL/L (ref 3.5–5.3)
SODIUM 24H UR-SCNC: 39 MOL/L
SODIUM SERPL-SCNC: 124 MMOL/L (ref 135–147)

## 2023-05-18 NOTE — TELEPHONE ENCOUNTER
It appears that the lab did not run the urine sodium and osmolality  Apologize to Geovanna Graff but he will need to submit another specimen and have his blood drawn again at the same time  This is necessary because the blood and urine tests have to be compared to each other at the same time  Also, please let him know that we have a primary care practice in [place]   The doctor there is Missy Harmon Phone [phone]

## 2023-05-18 NOTE — TELEPHONE ENCOUNTER
Advised patient of this, he understood and will go back to the lab  He also requested information be sent to him through [employer] sent

## 2023-05-19 ENCOUNTER — DOCUMENTATION (OUTPATIENT)
Dept: FAMILY MEDICINE CLINIC | Facility: CLINIC | Age: 63
End: 2023-05-19

## 2023-05-19 DIAGNOSIS — E87.1 HYPONATREMIA: Primary | ICD-10-CM

## 2023-05-19 RX ORDER — SODIUM CHLORIDE 1 G/1
1 TABLET ORAL 2 TIMES DAILY
Qty: 60 TABLET | Refills: 1 | Status: SHIPPED | OUTPATIENT
Start: 2023-05-19

## 2023-05-19 NOTE — PROGRESS NOTES
Lab Results   Component Value Date    SODIUM 124 (L) 05/18/2023    K 4 7 05/18/2023    CL 97 05/18/2023    CO2 21 05/18/2023    BUN 22 05/18/2023    CREATININE 1 54 (H) 05/18/2023    AGAP 6 05/18/2023           Sodium, Ur   Date Value Ref Range Status   05/18/2023 39  Final      Osmolality, Ur   Date Value Ref Range Status   05/18/2023 579 250 - 900 mmol/KG Final      I reviewed the clinical data and recent lab results with Dr Ursula Eubanks of nephrology  Is that he may have SIADH  We are going to start him on sodium tablets and get him to get repeat BMP and urine studies in 3 days  Will refer to nephrology for an opinion  I left a voicemail to have Lyndsay Flies call me back so we can discuss the plan  I was able to speak with Lyndsay Flies around 420 this afternoon  It turns out that he flew to [place] for the next 10 days  I told him that we were going to start him on sodium chloride tablets and that he would to find a pharmacy down there  I also would like him to have blood work in 3 days we'll also find a laboratory to get his blood drawn in [place]

## 2023-05-22 ENCOUNTER — TELEPHONE (OUTPATIENT)
Dept: FAMILY MEDICINE CLINIC | Facility: CLINIC | Age: 63
End: 2023-05-22

## 2023-05-22 NOTE — TELEPHONE ENCOUNTER
----- Message from Jazzy Flores MD sent at 5/22/2023  7:55 AM EDT -----  Regarding: Fax lab orders  Please fax lab orders that I entered on Friday which includes TSH, Basic metabolic pane, urine sodium, urine creatinine, urine awesome to:    [facility] fax [phone]    Thank you      Jacqueline Dawn

## 2023-06-06 ENCOUNTER — OFFICE VISIT (OUTPATIENT)
Dept: NEPHROLOGY | Facility: CLINIC | Age: 63
End: 2023-06-06
Payer: COMMERCIAL

## 2023-06-06 VITALS
DIASTOLIC BLOOD PRESSURE: 70 MMHG | WEIGHT: 249.4 LBS | HEART RATE: 81 BPM | HEIGHT: 72 IN | BODY MASS INDEX: 33.78 KG/M2 | SYSTOLIC BLOOD PRESSURE: 118 MMHG | OXYGEN SATURATION: 96 %

## 2023-06-06 DIAGNOSIS — R80.9 TYPE 2 DIABETES MELLITUS WITH MICROALBUMINURIA, WITHOUT LONG-TERM CURRENT USE OF INSULIN (HCC): ICD-10-CM

## 2023-06-06 DIAGNOSIS — E87.1 HYPONATREMIA: Primary | ICD-10-CM

## 2023-06-06 DIAGNOSIS — M54.9 ACUTE BACK PAIN, UNSPECIFIED BACK LOCATION, UNSPECIFIED BACK PAIN LATERALITY: ICD-10-CM

## 2023-06-06 DIAGNOSIS — N17.9 ACUTE KIDNEY INJURY (HCC): ICD-10-CM

## 2023-06-06 DIAGNOSIS — I10 ESSENTIAL HYPERTENSION: ICD-10-CM

## 2023-06-06 DIAGNOSIS — R80.9 MICROALBUMINURIA: ICD-10-CM

## 2023-06-06 DIAGNOSIS — E11.29 TYPE 2 DIABETES MELLITUS WITH MICROALBUMINURIA, WITHOUT LONG-TERM CURRENT USE OF INSULIN (HCC): ICD-10-CM

## 2023-06-06 LAB
SL AMB  POCT GLUCOSE, UA: NEGATIVE
SL AMB LEUKOCYTE ESTERASE,UA: NEGATIVE
SL AMB POCT BILIRUBIN,UA: NEGATIVE
SL AMB POCT BLOOD,UA: NEGATIVE
SL AMB POCT CLARITY,UA: CLEAR
SL AMB POCT COLOR,UA: NORMAL
SL AMB POCT KETONES,UA: NEGATIVE
SL AMB POCT NITRITE,UA: NEGATIVE
SL AMB POCT PH,UA: 7
SL AMB POCT SPECIFIC GRAVITY,UA: 1.02
SL AMB POCT URINE PROTEIN: NEGATIVE
SL AMB POCT UROBILINOGEN: 1

## 2023-06-06 PROCEDURE — 99204 OFFICE O/P NEW MOD 45 MIN: CPT | Performed by: INTERNAL MEDICINE

## 2023-06-06 PROCEDURE — 81002 URINALYSIS NONAUTO W/O SCOPE: CPT | Performed by: INTERNAL MEDICINE

## 2023-06-06 RX ORDER — VARENICLINE TARTRATE 1 MG/1
1 TABLET, FILM COATED ORAL 2 TIMES DAILY
COMMUNITY
Start: 2023-05-17

## 2023-06-06 NOTE — PROGRESS NOTES
[facility]  Consultation - Nephrology    [facility] 58 y o  male MRN: [id number]      A/P:      1  Hyponatremia  -     POCT urine dip  -     Basic metabolic panel; Future; Expected date: 06/13/2023  -     Cortisol Level, AM Specimen; Future; Expected date: 06/13/2023    2  Acute kidney injury ([address] )    3  Essential hypertension    4  Type 2 diabetes mellitus with microalbuminuria, without long-term current use of insulin ([facility])    5  Microalbuminuria    6  Acute back pain, unspecified back location, unspecified back pain laterality         I have reviewed pertinent labs and imaging with patient  1   Hyponatremia  Chronic, asymptomatic at present  Moderate over the past month with a sodium reed of 124 on 5/18  Most recent mildly low at 131  Urine studies reviewed  Urine sodium 42, urine osmolality 316  The studies are consistent with ADH release  Any urine osmolality above 100 is not maximally dilute  Reviewed potential etiologies with patient and wife  He does have excessive fluid intake  His hyponatremia is not new and has been ongoing  We reviewed sodium trends previously that showed a sodium in the low 130 range  Suspect that he has been diluting his serum sodium with a gallon or more fluid intake per day  He has significantly cut back to a third of a gallon  He has achalasia which requires excessive fluid intake  Suspect that in addition to his fluid intake he had not osmotic ADH release due to pain event  He was having back pain in May  He presented to the emergency room for evaluation of this  His etiology of the back pain has been unclear  He has been seen by multiple specialist including surgery, urology and primary care provider for causation of pain  Protein intake not an issue for patient  He is currently on 1 salt tab BID  Reviewed side effect profile of hypertension and edema    He does not appear to have any complications from the supplement  Low back pain is occurring, suspect we will need ongoing supplementation  Especially since we cannot severely limit his fluid intake  We discussed Urena supplements that might also be considered to maintain serum sodium  Check chemistry in a week  TSH was already checked and was okay  Even though blood pressure is within normal limits, we will check a 8 AM cortisol for completeness  Discussed how to perform this test   Timing is important  Keep fluid intake at three quarters of a gallon for now until repeat chemistry checked  2  EMILY  Creatinine peaked at 1 5, then improved to 1 1  Likely Bactrim effect  Volume status at this time unclear  3   Type 2 diabetes with microalbuminuria  Most recent A1c under reasonable control at 6 5  Maintained on metformin  Management per PCP  4   Essential hypertension, blood pressure under excellent control  No need for titration at present  Continue lisinopril 10 mg/day  Reviewed association with nonasthmatic ADH release with lisinopril  5    Microalbuminuria--A2  He had peaking creatinine 1 5 that was potentially due to Bactrim effect  Still establishing baseline but may be 1 1  Suspect etiology of microalbuminuria due to diabetes  Microabuminuria /cr  ratio 139 mg/g creatinine on 5/15  His blood pressure is under excellent control and does not warrant escalation of ACE at this timed  We will trend microalbumin creatinine ratio and decide if the dose needs to be increased based on trends  6   Back pain, migratory  Alternates between right and left  Appears to be an ongoing issue, he had partial improvement with Bactrim but then pain occurred despite this  Urology was not concerned that he had an active infection and he was already treated for this  CT scan has showed no evidence of pyelonephritis, he may have scar tissue which shows up as  perinephric stranding  Reviewed consideration for imaging with primary    They are concerned that an MRI would not be covered  The patient and any family members present were counseled today on risks benefits and alternatives of any medications, and were agreeable to the treatment plan  They were counseled on diagnostic testing if necessary, and projected outcomes as are available and medically indicated  All questions were asked and answered during the encounter  If more questions are to arise the patient will call the office  Alarm and return precautions discussed and accepted  Thank you for allowing us to participate in the care of your patient  History of Present Illness   Physician Requesting Consult: No att  providers found    HPI: Nain Hernandez is a 58y o  year old male who presents for hyponatremia evaluation  Na 128 and Cr 1 0 on 5/9 and Na 126 on 5/15 and Cr bumped to 1 5 (bactrim effect)  Na dropped to 124 on 5/18 and Cr 1 5  Cr 1 1 and Na 131  November 2022 Na 131  Patient drinks 1 5 galloon of fluid per day for 40 years  He has achalasia and needs to drink this amount of fluid  Reports back pain ongoing and presented to ED on 5/9  Pain /10  He was recommended to see a general surgeon for gallbladder  Then he was referred to urology for potential pyelonephritis  Urology felt that he did not have pyelonephritis  Denies hematuria  Denies nephrolithiasis  Denies frequent pyelo/UTI  In may was first occasion he had UTI and was treated with bactrim  May have had partial improvement in back pain but not completely resolved  Recommended to have MRI to evaluate for spinal issues  Reports DM x 10-15 years maintained only on metformin     Denies hx hyponatremia  Na 131 in ER and started on salt tablet 1 gram BID, 2 weeks ago  He was told to cut fluid intake, he is drinking 3/4 galloon per day  Constitutional ROS- Denies fatigue, fever, chills, night sweats, weight changes  HEENT ROS- Denies headaches or history of trauma, blurred vision     Endocrine ROS- + DM x 10 -15 years, metformin   Cardiovascular ROS- Denies chest pain, palpitation, dyspnea exertion, orthopnea, claudication  Pulmonary ROS-  Denies cough, hemoptysis, shortness of breath  GI ROS- Denies abdominal pain, diarrhea, nausea, swallowing problems, vomiting, constipation, blood in stools, fecal incontinence  Hematological ROS- Denies history of easy bruising, blood clots, bleeding or blood transfusions  Genitourinary ROS- Denies recent hematuria, pyuria, flank pain, change in urinary stream, decreased urinary output, increased urinary frequency, nocturia, foamy urine, or urinary incontinence  Lymphatic ROS- Denies lymphadenopathy  Musculoskeletal ROS- back pain  Dermatological ROS- Denies rash, wounds, ulcers, itching, jaundice  Psychiatric ROS- Denies hallucinations, disorientation  Neurological ROS- No stroke or TIA symptoms       Historical Information   Past Medical History:   Diagnosis Date   • Diabetes mellitus ([facility] [address] )    • Heart murmur    • Hypertension      Past Surgical History:   Procedure Laterality Date   • APPENDECTOMY N/A    • JOINT REPLACEMENT Right    • MYOTOMY HELLER LAPAROSCOPIC  2018    [facility]   • STOMACH SURGERY       Social History   Social History     Substance and Sexual Activity   Alcohol Use Yes    Comment: rarely     Social History     Substance and Sexual Activity   Drug Use Never     Social History     Tobacco Use   Smoking Status Every Day   • Types: Cigars   Smokeless Tobacco Current     Family History   Problem Relation Age of Onset   • Cancer Mother         stomach   • Heart attack Father    • Cancer Sister         bladder   • Cancer Brother         colon       Meds/Allergies   all current active meds have been reviewed, current meds:     Current Outpatient Medications:   •  aspirin 81 mg chewable tablet, Chew 81 mg daily, Disp: , Rfl:   •  atorvastatin (LIPITOR) 20 mg tablet, Take 20 mg by mouth daily, Disp: , Rfl:   •  esomeprazole (NexIUM) 40 MG capsule, TAKE 1 CAPSULE (40 MG TOTAL) BY MOUTH IN THE MORNING, Disp: 90 capsule, Rfl: 3  •  lisinopril (ZESTRIL) 10 mg tablet, Take 10 mg by mouth daily, Disp: , Rfl:   •  metFORMIN (GLUCOPHAGE) 500 mg tablet, Take 500 mg by mouth 2 (two) times a day, Disp: , Rfl:   •  Omega-3 Fatty Acids (fish oil) 1,000 mg, Take 1,000 mg by mouth daily, Disp: , Rfl:   •  sodium chloride 1 g tablet, Take 1 tablet (1 g total) by mouth 2 (two) times a day, Disp: 60 tablet, Rfl: 1  •  Sodium Fluoride 5000 PPM 1 1 % PSTE, APPLY TO AFFECTED AREA NIGHTLY(NOT COVERED BY INSURANCE), Disp: , Rfl:   •  varenicline (CHANTIX) 1 mg tablet, Take 1 mg by mouth 2 (two) times a day, Disp: , Rfl:   •  varenicline (CHANTIX) 0 5 mg tablet, Take 1 mg by mouth (Patient not taking: Reported on 6/6/2023), Disp: , Rfl:      No Known Allergies    Objective     Vitals:    06/06/23 0900   BP: 118/70   Pulse: 81   SpO2: 96%       General Appearance:    No acute distress  Cooperative  Appears stated age  Head:    Normocephalic  Atraumatic  Eyes:    Lids, conjunctiva normal  No scleral icterus  Ears:    Normal external ears  Nose:   Nares normal  No drainage  Mouth:   Lips, tongue normal  Mucosa normal     Neck:   Supple  Symmetrical    Back:     Symmetric  No CVA tenderness  Lungs:     Normal respiratory effort  Clear to auscultation bilaterally  Chest wall:    No tenderness or deformity  Heart:    Regular rate and rhythm  Normal S1 and S2  No murmur  No JVD  No edema  Abdomen:     Soft  Non-tender  Bowel sounds active  Genitourinary:   No Barksdale catheter present  Extremities:   Extremities normal  Atraumatic  No cyanosis  Skin:   Warm and dry  No pallor, jaundice, rash, ecchymoses  Neurologic:   Alert and oriented to person, place, time  No focal deficit           Current Weight: Weight - Scale: 113 kg (249 lb 6 4 oz)    First Weight:    Wt Readings from Last 3 Encounters:   06/06/23 113 kg (249 lb 6 4 oz)   05/15/23 113 kg (249 lb)   05/12/23 115 kg (253 lb)        Lab Results:  I have personally reviewed pertinent labs  Sodium 131 potassium 4 7 chloride 94 2 CO2 is 26 calcium 9 6 urine osmolality 316 creatinine 1 18 BUN 17 EGFR 69 mill per minute  TSH 2 6  Radiology review:  5/9/2023  CT abdomen pelvis with contrast  KIDNEYS/URETERS: No hydronephrosis or urinary tract calculus  One or more sharply circumscribed subcentimeter renal hypodensities are present, too small to accurately characterize, and statistically most likely benign findings  According to recent   literature (Radiology 2019) no further workup of these findings is recommended      Mild perinephric stranding identified  Correlate with urinalysis  No hydroureteronephrosis or obstructive pathology  August Thomas,       This consultation note was produced in part using a dictation device which may document imprecise wording from author's original intent

## 2023-06-06 NOTE — PATIENT INSTRUCTIONS
Keep fluid intake around 3/4 galloon until repeat blood work  You can research a supplement called Urena to help with low salt levels  This can be obtained online without a prescription  This is a dietary supplement is not available generally through prescription  You have a low salt level problem from  You have Decreased ability to get rid of your water  You have overwhelmed your kidneys ability to get rid of water and low salt levels have been ongoing for years  Now with pain this is called an increase in ADH release which is causing further inability to get rid of water  Salt tablets are helping maintain the salt level  Check blood work in 1 week  Will check another hormonal workup and blood test in 1 week

## 2023-07-07 ENCOUNTER — OFFICE VISIT (OUTPATIENT)
Dept: NEPHROLOGY | Facility: CLINIC | Age: 63
End: 2023-07-07
Payer: COMMERCIAL

## 2023-07-07 VITALS
HEART RATE: 68 BPM | OXYGEN SATURATION: 96 % | WEIGHT: 254.2 LBS | DIASTOLIC BLOOD PRESSURE: 82 MMHG | BODY MASS INDEX: 34.43 KG/M2 | HEIGHT: 72 IN | SYSTOLIC BLOOD PRESSURE: 130 MMHG

## 2023-07-07 DIAGNOSIS — R80.9 TYPE 2 DIABETES MELLITUS WITH MICROALBUMINURIA, WITHOUT LONG-TERM CURRENT USE OF INSULIN (HCC): ICD-10-CM

## 2023-07-07 DIAGNOSIS — E11.29 TYPE 2 DIABETES MELLITUS WITH MICROALBUMINURIA, WITHOUT LONG-TERM CURRENT USE OF INSULIN (HCC): ICD-10-CM

## 2023-07-07 DIAGNOSIS — I10 ESSENTIAL HYPERTENSION: ICD-10-CM

## 2023-07-07 DIAGNOSIS — R80.9 MICROALBUMINURIA: Primary | ICD-10-CM

## 2023-07-07 DIAGNOSIS — E87.1 HYPONATREMIA: ICD-10-CM

## 2023-07-07 PROCEDURE — 99214 OFFICE O/P EST MOD 30 MIN: CPT | Performed by: INTERNAL MEDICINE

## 2023-07-07 RX ORDER — SODIUM CHLORIDE 1 G/1
1 TABLET ORAL DAILY
Qty: 30 TABLET | Refills: 1 | Status: SHIPPED | OUTPATIENT
Start: 2023-07-07

## 2023-07-07 NOTE — PROGRESS NOTES
Assessment & Plan:    1. Microalbuminuria  -     Albumin / creatinine urine ratio; Future; Expected date: 07/14/2023  -     Urinalysis with microscopic; Future; Expected date: 07/14/2023  -     Comprehensive metabolic panel; Future; Expected date: 07/14/2023  -     Protein electrophoresis, serum; Future; Expected date: 07/14/2023  -     Protein electrophoresis, urine; Future; Expected date: 07/14/2023  -     [place]/Lambda Light Chains Free With Ratio, Serum; Future; Expected date: 07/14/2023  -     TSH w/Reflex; Future; Expected date: 07/14/2023    2. Hyponatremia  -     sodium chloride 1 g tablet; Take 1 tablet (1 g total) by mouth in the morning  -     Albumin / creatinine urine ratio; Future; Expected date: 07/14/2023  -     Urinalysis with microscopic; Future; Expected date: 07/14/2023  -     Comprehensive metabolic panel; Future; Expected date: 07/14/2023  -     Protein electrophoresis, serum; Future; Expected date: 07/14/2023  -     Protein electrophoresis, urine; Future; Expected date: 07/14/2023  -     [place]/Lambda Light Chains Free With Ratio, Serum; Future; Expected date: 07/14/2023  -     TSH w/Reflex; Future; Expected date: 07/14/2023    3. Type 2 diabetes mellitus with microalbuminuria, without long-term current use of insulin ([address])    4. Essential hypertension         1. Microalbuminuria  A2 last quantification showed 139 mg/g creatinine. We will repeat for persistence. May consider SGLT2 use in the future. Likely due to diabetes however, will evaluate for paraproteinemia. Check SPEP, UPEP, kappa lambda ratio. Repeat TSH and UA as well for hyponatremia evaluation. Check CMP 1 week after decreasing salt tablet use. Follow-up in 3 months. 2.  Hyponatremia, felt to be due to SIADH in the presence of excess free water intake. He takes an over a gallon of fluid per day. He has a history of esophageal issues that warrant frequent water intake.     He had mild hyponatremia previously up until May when he had moderate hyponatremia in the presence of pain. His TSH and cortisol were okay. Denies excess alcohol intake. Denies NSAID use. No history of malignancy. Sodium trends of normalized at 135. His back pain has resolved. Recommend to slowly start decreasing his sodium tablet use. Try to get down to 1 g/day. If he is successful in doing so with stable sodium will try to titrate off. Given his microalbuminuria, and will evaluate for paraproteinemia. 3.  Type 2 diabetes with microalbuminuria  Repeat microalbuminuria quantification. Continue metformin. May consider SGLT2 inhibitor addition based on trends. 4.  Essential hypertension, blood pressure is under excellent control. He is maintained on lisinopril 10 mg/day. No need to escalate therapy at this point. The benefits, risks and alternatives to the treatment plan were discussed at this visit. Patient was advised of common adverse effects of any medical therapies prescribed. All questions were answered and discussed with the patient and any accompanying family members or caretakers. Subjective:      Patient ID: Andrea Reina is a 58 y.o. male presents for follow-up in the [facility] office. He was initially seen in consultation on 6/6 for hyponatremia. He had previously mildly low sodium which were attributed to excess fluid intake. His sodium reed to at 124 in May but this was in the presence of back pain. He was suspected to have SIADH. He was started on salt tablets 1 g twice daily. His sodium had improved after addition. His TSH was checked and this was okay. Denies a history of cirrhosis. Denies frequent alcohol use. His wife accompanies him at the visit. HPI  In the interim since his last visit, denies any new medical conditions, surgeries, allergies, medications. His back pain has resolved. His fluid intake is over a gallon per day. He has a history of achalasia.     He is currently taking salt tablets 1 g twice daily. Denies issues tolerating. Denies routine NSAID use. Takes tylenol if needed for pain. He had 4 beers but infrequently. Occasional cigar use. Currently on chantix. Does not check BG at home. Does not check BP at home. For blood pressure he is maintained on lisinopril 10 mg/day. His blood pressure is under excellent control 130/82. For diabetes he is maintained on metformin 500 mg twice daily. His last chemistry showed a cortisol of 9.3 in the setting of a normal sodium at 135. The following portions of the patient's history were reviewed and updated as appropriate: allergies, current medications, past family history, past medical history, past social history, past surgical history, and problem list.    Review of Systems   Respiratory: Negative. Cardiovascular: Negative. Gastrointestinal: Negative. Genitourinary: Negative. Musculoskeletal: Negative. Neurological: Negative. All other systems reviewed and are negative. Objective:      /82 (BP Location: Left arm, Patient Position: Sitting, Cuff Size: Large) Comment: 134/84 Right  Pulse 68   Ht 6' (1.829 m)   Wt 115 kg (254 lb 3.2 oz)   SpO2 96%   BMI 34.48 kg/m²          Physical Exam  Vitals reviewed. Constitutional:       General: He is not in acute distress. HENT:      Head: Atraumatic. Nose: Nose normal.      Mouth/Throat:      Mouth: Mucous membranes are moist.      Pharynx: Oropharynx is clear. Eyes:      Conjunctiva/sclera: Conjunctivae normal.   Cardiovascular:      Rate and Rhythm: Normal rate and regular rhythm. Heart sounds: Normal heart sounds. No friction rub. Pulmonary:      Breath sounds: No wheezing, rhonchi or rales. Abdominal:      Palpations: Abdomen is soft. Tenderness: There is no abdominal tenderness. There is no guarding. Musculoskeletal:      Right lower leg: No edema. Left lower leg: No edema. Skin:     General: Skin is warm. Coloration: Skin is not jaundiced. Findings: No bruising or rash. Neurological:      General: No focal deficit present. Mental Status: He is alert and oriented to person, place, and time. Cranial Nerves: No cranial nerve deficit.    Psychiatric:         Mood and Affect: Mood normal.         Behavior: Behavior normal.             Lab Results   Component Value Date    SODIUM 124 (L) 05/18/2023    K 4.7 05/18/2023    CL 97 05/18/2023    CO2 21 05/18/2023    AGAP 6 05/18/2023    BUN 22 05/18/2023    CREATININE 1.54 (H) 05/18/2023    GLUC 98 05/09/2023    GLUF 90 05/18/2023    CALCIUM 9.6 05/18/2023    AST 27 05/09/2023    ALT 24 05/09/2023    ALKPHOS 80 05/09/2023    TP 7.5 05/09/2023    TBILI 0.54 05/09/2023    EGFR 46 05/18/2023      Lab Results   Component Value Date    CREATININE 1.54 (H) 05/18/2023    CREATININE 1.51 (H) 05/16/2023    CREATININE 1.02 05/09/2023      Lab Results   Component Value Date    COLORU Light yellow, Good 06/06/2023    CLARITYU Clear 06/06/2023    SPECGRAV <=1.005 (L) 05/09/2023    PHUR 6.0 05/09/2023    LEUKOCYTESUR Negative 06/06/2023    NITRITE Negative 06/06/2023    PROTEIN UA Negative 06/06/2023    GLUCOSEU Negative 06/06/2023    KETONESU Negative 06/06/2023    UROBILINOGEN 1 06/06/2023    BILIRUBINUR Negative 06/06/2023    BLOODU Negative 06/06/2023      No results found for: "LABPROT"  No results found for: "[employer]", "[id number]"  Lab Results   Component Value Date    WBC 6.50 05/09/2023    HGB 13.7 05/09/2023    HCT 41.2 05/09/2023    MCV 86 05/09/2023     05/09/2023      Lab Results   Component Value Date    HGB 13.7 05/09/2023      No results found for: "IRON", "TIBC", "FERRITIN"   No results found for: "PTHCALCIUM", "[id number]", "PHOSPHORUS"   No results found for: "CHOLESTEROL", "HDL", "LDLCALC", "TRIG"   No results found for: "URICACID"   Lab Results   Component Value Date    HGBA1C 6.5 05/15/2023      No results found for: "TSHANTIBODY", "[id number]", "Arvilla [place]"   No results found for: "TAMMIE", "DSDNAAB", "RFIGM"   No results found for: "PROT", "UPEP", "IMMUNOFIX", "Deretha Celia", "Mi Patron"     Portions of the record may have been created with voice recognition software. Occasional wrong word or "sound a like" substitutions may have occurred due to the inherent limitations of voice recognition software. Read the chart carefully and recognize, using context, where substitutions have occurred. If you have any questions, please contact the dictating provider.

## 2023-07-07 NOTE — PATIENT INSTRUCTIONS
Recommend cutting salt tablet down to one a day. Check blood work in about a week. If sodium level is above 130, may be able to cut back even from there. Goal is to come off salt tablet altogether. Follow up in 3 months.

## 2023-07-10 PROBLEM — N12 PYELONEPHRITIS: Status: RESOLVED | Noted: 2023-05-11 | Resolved: 2023-07-10

## 2023-07-18 ENCOUNTER — OFFICE VISIT (OUTPATIENT)
Dept: FAMILY MEDICINE CLINIC | Facility: CLINIC | Age: 63
End: 2023-07-18
Payer: COMMERCIAL

## 2023-07-18 VITALS
SYSTOLIC BLOOD PRESSURE: 112 MMHG | OXYGEN SATURATION: 95 % | HEIGHT: 72 IN | HEART RATE: 73 BPM | DIASTOLIC BLOOD PRESSURE: 63 MMHG | TEMPERATURE: 98 F | WEIGHT: 255.6 LBS | BODY MASS INDEX: 34.62 KG/M2

## 2023-07-18 DIAGNOSIS — E11.29 TYPE 2 DIABETES MELLITUS WITH MICROALBUMINURIA, WITHOUT LONG-TERM CURRENT USE OF INSULIN (HCC): ICD-10-CM

## 2023-07-18 DIAGNOSIS — E08.40 DIABETES MELLITUS DUE TO UNDERLYING CONDITION WITH DIABETIC NEUROPATHY, WITHOUT LONG-TERM CURRENT USE OF INSULIN (HCC): ICD-10-CM

## 2023-07-18 DIAGNOSIS — E87.1 HYPONATREMIA: Primary | ICD-10-CM

## 2023-07-18 DIAGNOSIS — R80.9 TYPE 2 DIABETES MELLITUS WITH MICROALBUMINURIA, WITHOUT LONG-TERM CURRENT USE OF INSULIN (HCC): ICD-10-CM

## 2023-07-18 PROBLEM — R10.9 BILATERAL FLANK PAIN: Status: RESOLVED | Noted: 2023-05-11 | Resolved: 2023-07-18

## 2023-07-18 PROBLEM — E66.9 OBESITY (BMI 30.0-34.9): Status: ACTIVE | Noted: 2023-07-18

## 2023-07-18 PROBLEM — E66.811 OBESITY (BMI 30.0-34.9): Status: ACTIVE | Noted: 2023-07-18

## 2023-07-18 PROCEDURE — 99214 OFFICE O/P EST MOD 30 MIN: CPT | Performed by: INTERNAL MEDICINE

## 2023-07-18 RX ORDER — AMOXICILLIN 500 MG/1
2000 CAPSULE ORAL AS NEEDED
COMMUNITY
Start: 2023-05-17

## 2023-07-18 NOTE — ASSESSMENT & PLAN NOTE
Nephrology concurred that this is probably SIADH. There is some labs pending. He is going to go and get a follow-up basic metabolic panel.

## 2023-07-18 NOTE — PROGRESS NOTES
Assessment/Plan:    Problem List Items Addressed This Visit        Endocrine    Diabetes mellitus, type II ([address])    Relevant Orders    Ambulatory Referral to Ophthalmology    Diabetes mellitus due to underlying condition with diabetic neuropathy, without long-term current use of insulin ([address])       Lab Results   Component Value Date    HGBA1C 6.5 05/15/2023   He does have mild, patchy neuropathy in his feet. I encouraged him to always wear shoes or slippers and walking around the house. I'm referring him to podiatry for routine nail care. Relevant Orders    Ambulatory Referral to Podiatry       Other    Hyponatremia - Primary     Nephrology concurred that this is probably SIADH. There is some labs pending. He is going to go and get a follow-up basic metabolic panel. BMI Counseling: Body mass index is 34.67 kg/m². The BMI is above normal. Nutrition recommendations include moderation in carbohydrate intake. Rationale for BMI follow-up plan is due to patient being overweight or obese. Chief Complaint    Follow-up; Care Gap Request         Patient ID: Livier Kovacs is a 58 y.o. male who returns for routine follow up. His back pain resolved while he was in [place]. He thinks it may be because he got rid of his wedge pillow. He did see nephrology for assessment of hyponatremia. His dose of sodium chloride tablets was decreased. He is supposed to go for follow up BMP and other labs ordered by nephrology. He has some tingling in his feet which bother him especially when it's cold. He sleeps with heavy socks in the winter.        Objective:    /63 (BP Location: Right arm, Patient Position: Sitting, Cuff Size: Large)   Pulse 73   Temp 98 °F (36.7 °C)   Ht 6' (1.829 m)   Wt 116 kg (255 lb 9.6 oz)   SpO2 95%   BMI 34.67 kg/m²     Wt Readings from Last 3 Encounters:   07/18/23 116 kg (255 lb 9.6 oz)   07/07/23 115 kg (254 lb 3.2 oz)   06/06/23 113 kg (249 lb 6.4 oz)         Physical Exam    General:  Well-developed, well-nourished, in no acute distress. Cardiac:  Regular rate and rhythm, no murmur, gallop, or rub. There is no JVD or HJR. Lungs:  Clear to auscultation and percussion. Abdomen:  Soft, nontender, normoactive bowel sounds, no palpable masses, no hepatosplenomegaly. Extremities:  No clubbing, cyanosis, or edema. Diabetic Foot Exam    Patient's shoes and socks removed. Right Foot/Ankle   Right Foot Inspection  Skin Exam: skin normal and skin intact. No dry skin, no warmth, no callus, no erythema, no maceration, no abnormal color, no pre-ulcer, no ulcer and no callus. Sensory   Monofilament testing: diminished    Vascular  The right DP pulse is 2+. The right PT pulse is 2+. Left Foot/Ankle  Left Foot Inspection  Skin Exam: skin normal and skin intact. No dry skin, no warmth, no erythema, no maceration, normal color, no pre-ulcer, no ulcer and no callus. Sensory   Monofilament testing: diminished    Vascular  The left DP pulse is 2+. The left PT pulse is 2+.      Assign Risk Category  No deformity present  Loss of protective sensation  No weak pulses  Risk: 1

## 2023-07-18 NOTE — ASSESSMENT & PLAN NOTE
Lab Results   Component Value Date    HGBA1C 6.5 05/15/2023   He does have mild, patchy neuropathy in his feet. I encouraged him to always wear shoes or slippers and walking around the house. I'm referring him to podiatry for routine nail care.

## 2023-07-31 ENCOUNTER — TELEPHONE (OUTPATIENT)
Dept: NEPHROLOGY | Facility: CLINIC | Age: 63
End: 2023-07-31

## 2023-07-31 DIAGNOSIS — I10 HTN (HYPERTENSION): Primary | ICD-10-CM

## 2023-07-31 RX ORDER — LISINOPRIL 5 MG/1
5 TABLET ORAL DAILY
Qty: 90 TABLET | Refills: 1 | Status: SHIPPED | OUTPATIENT
Start: 2023-07-31

## 2023-07-31 NOTE — PROGRESS NOTES
K 5.7, reviewed high potassium foods to avoid. We will send patient a list in the mail. Hold lisinopril for 2 days then resume at 5mg dose. Decrease lisinopril 10mg to 5mg per day. New Rx sent to pharmacy. Na 135, change to QOD sodium chloride 1 gram per day and can stop if Na reasonable in recheck in 1 week. Kappa/lambda ratio elevated and spep may show spike, waiting on confirmatory testing. Reviewed with patient.

## 2023-08-08 LAB
LEFT EYE DIABETIC RETINOPATHY: NORMAL
RIGHT EYE DIABETIC RETINOPATHY: NORMAL

## 2023-08-08 PROCEDURE — 2023F DILAT RTA XM W/O RTNOPTHY: CPT | Performed by: INTERNAL MEDICINE

## 2023-08-29 DIAGNOSIS — E87.1 HYPONATREMIA: Primary | ICD-10-CM

## 2023-09-28 ENCOUNTER — OFFICE VISIT (OUTPATIENT)
Dept: URGENT CARE | Facility: CLINIC | Age: 63
End: 2023-09-28
Payer: COMMERCIAL

## 2023-09-28 VITALS
DIASTOLIC BLOOD PRESSURE: 82 MMHG | SYSTOLIC BLOOD PRESSURE: 139 MMHG | RESPIRATION RATE: 20 BRPM | OXYGEN SATURATION: 98 % | HEART RATE: 97 BPM | TEMPERATURE: 97.6 F

## 2023-09-28 DIAGNOSIS — R05.1 ACUTE COUGH: ICD-10-CM

## 2023-09-28 DIAGNOSIS — J01.00 ACUTE NON-RECURRENT MAXILLARY SINUSITIS: Primary | ICD-10-CM

## 2023-09-28 LAB
SARS-COV-2 AG UPPER RESP QL IA: NEGATIVE
VALID CONTROL: NORMAL

## 2023-09-28 PROCEDURE — 99213 OFFICE O/P EST LOW 20 MIN: CPT | Performed by: PHYSICIAN ASSISTANT

## 2023-09-28 PROCEDURE — 87811 SARS-COV-2 COVID19 W/OPTIC: CPT | Performed by: PHYSICIAN ASSISTANT

## 2023-09-28 RX ORDER — FENOFIBRATE 145 MG/1
145 TABLET, COATED ORAL DAILY
COMMUNITY
Start: 2023-08-04

## 2023-09-28 RX ORDER — BENZONATATE 100 MG/1
100 CAPSULE ORAL 3 TIMES DAILY PRN
Qty: 20 CAPSULE | Refills: 0 | Status: SHIPPED | OUTPATIENT
Start: 2023-09-28

## 2023-09-28 RX ORDER — AMOXICILLIN AND CLAVULANATE POTASSIUM 875; 125 MG/1; MG/1
1 TABLET, FILM COATED ORAL EVERY 12 HOURS SCHEDULED
Qty: 14 TABLET | Refills: 0 | Status: SHIPPED | OUTPATIENT
Start: 2023-09-28 | End: 2023-10-05

## 2023-09-28 RX ORDER — AZELASTINE 1 MG/ML
1 SPRAY, METERED NASAL 2 TIMES DAILY
Qty: 30 ML | Refills: 0 | Status: SHIPPED | OUTPATIENT
Start: 2023-09-28

## 2023-09-28 NOTE — PATIENT INSTRUCTIONS
Take antibiotic as instructed for the next 7 days. Use Astelin and Tessalon as instructed. Follow-up with PCP.

## 2023-09-28 NOTE — PROGRESS NOTES
[facility]        NAME: Savanah Mireles is a 58 y.o. male  : 1960    MRN: [id number]  DATE: 2023  TIME: 11:42 AM    Assessment and Plan   Acute non-recurrent maxillary sinusitis [J01.00]  1. Acute non-recurrent maxillary sinusitis  Poct Covid 19 Rapid Antigen Test    amoxicillin-clavulanate (AUGMENTIN) 875-125 mg per tablet    azelastine (ASTELIN) 0.1 % nasal spray    benzonatate (TESSALON PERLES) 100 mg capsule      2. Acute cough              Patient Instructions     Patient Instructions   Take antibiotic as instructed for the next 7 days. Use Astelin and Tessalon as instructed. Follow-up with PCP. Follow up with PCP in 3-5 days. Proceed to  ER if symptoms worsen. Chief Complaint     Chief Complaint   Patient presents with   • Cough     C/o productive cough and nasal congestion onset "10 days ago". Denies other symptoms. Pt presents today, states "its getting worse". Denies PCP contact. Reports taking OTC medications, "not really getting better at night". Pt reports the congestion is so bad I haven't had my C-PAP machine at night". Pt reports "my wife had COVID 3 weeks ago". Denies taking any home covid tests since that time. • Nasal Congestion         History of Present Illness       Patient is a 65-year-old male presenting today with cold-like symptoms x10 days. Patient notes over the last several days he has been experiencing persistent nasal congestion, sinus pressure, postnasal drip and dry cough, has tried several different over-the-counter cough and cold medications but notes no change or improvement of his symptoms. Notes he is having difficulty sleeping at night due to his congestion and attempted use of CPAP. Denies fever, chills, chest tightness, SOB, N/V/D. Review of Systems   Review of Systems   Constitutional: Negative for chills and fever. HENT: Positive for congestion and sinus pressure.  Negative for ear pain, sore throat and trouble swallowing. Eyes: Negative for pain. Respiratory: Positive for cough. Negative for chest tightness and shortness of breath. Cardiovascular: Negative for chest pain. Gastrointestinal: Negative for abdominal pain. Musculoskeletal: Negative for myalgias. Skin: Negative for rash. Neurological: Negative for headaches.          Current Medications       Current Outpatient Medications:   •  amoxicillin-clavulanate (AUGMENTIN) 875-125 mg per tablet, Take 1 tablet by mouth every 12 (twelve) hours for 7 days, Disp: 14 tablet, Rfl: 0  •  azelastine (ASTELIN) 0.1 % nasal spray, 1 spray into each nostril 2 (two) times a day Use in each nostril as directed, Disp: 30 mL, Rfl: 0  •  benzonatate (TESSALON PERLES) 100 mg capsule, Take 1 capsule (100 mg total) by mouth 3 (three) times a day as needed for cough, Disp: 20 capsule, Rfl: 0  •  amoxicillin (AMOXIL) 500 mg capsule, Take 2,000 mg by mouth as needed 1 hour prior to dental appointment, Disp: , Rfl:   •  aspirin 81 mg chewable tablet, Chew 81 mg daily, Disp: , Rfl:   •  atorvastatin (LIPITOR) 20 mg tablet, Take 20 mg by mouth daily, Disp: , Rfl:   •  esomeprazole (NexIUM) 40 MG capsule, TAKE 1 CAPSULE (40 MG TOTAL) BY MOUTH IN THE MORNING, Disp: 90 capsule, Rfl: 3  •  fenofibrate (TRICOR) 145 mg tablet, Take 145 mg by mouth daily, Disp: , Rfl:   •  lisinopril (ZESTRIL) 5 mg tablet, Take 1 tablet (5 mg total) by mouth daily, Disp: 90 tablet, Rfl: 1  •  metFORMIN (GLUCOPHAGE) 500 mg tablet, Take 500 mg by mouth 2 (two) times a day, Disp: , Rfl:   •  Omega-3 Fatty Acids (fish oil) 1,000 mg, Take 1,000 mg by mouth daily, Disp: , Rfl:   •  sodium chloride 1 g tablet, Take 1 tablet (1 g total) by mouth in the morning, Disp: 30 tablet, Rfl: 1  •  Sodium Fluoride 5000 PPM 1.1 % PSTE, APPLY TO AFFECTED AREA NIGHTLY(NOT COVERED BY INSURANCE), Disp: , Rfl:   •  varenicline (CHANTIX) 1 mg tablet, Take 1 mg by mouth 2 (two) times a day, Disp: , Rfl:     Current Allergies Allergies as of 09/28/2023   • (No Known Allergies)            The following portions of the patient's history were reviewed and updated as appropriate: allergies, current medications, past family history, past medical history, past social history, past surgical history and problem list.     Past Medical History:   Diagnosis Date   • Bilateral flank pain 5/11/2023   • Diabetes mellitus ([address])    • Heart murmur    • Hypertension        Past Surgical History:   Procedure Laterality Date   • APPENDECTOMY N/A    • JOINT REPLACEMENT Right    • MYOTOMY HELLER LAPAROSCOPIC  2018    [place]   • STOMACH SURGERY         Family History   Problem Relation Age of Onset   • Cancer Mother         stomach   • Heart attack Father    • Cancer Sister         bladder   • Cancer Brother         colon         Medications have been verified. Objective   /82 (BP Location: Right arm, Patient Position: Sitting)   Pulse 97   Temp 97.6 °F (36.4 °C) (Temporal)   Resp 20   SpO2 98%        Physical Exam     Physical Exam  Vitals and nursing note reviewed. Constitutional:       General: He is not in acute distress. Appearance: Normal appearance. He is well-developed. He is not toxic-appearing. HENT:      Head: Normocephalic and atraumatic. Right Ear: Tympanic membrane, ear canal and external ear normal.      Left Ear: Tympanic membrane, ear canal and external ear normal.      Nose: Congestion present. Right Sinus: Maxillary sinus tenderness present. No frontal sinus tenderness. Left Sinus: Maxillary sinus tenderness present. No frontal sinus tenderness. Mouth/Throat:      Mouth: Mucous membranes are moist.      Pharynx: Oropharynx is clear. No oropharyngeal exudate or posterior oropharyngeal erythema. Eyes:      Conjunctiva/sclera: Conjunctivae normal.   Cardiovascular:      Rate and Rhythm: Normal rate and regular rhythm. Pulses: Normal pulses. Heart sounds: Normal heart sounds. Pulmonary:      Effort: Pulmonary effort is normal.      Breath sounds: Normal breath sounds. Musculoskeletal:      Cervical back: Normal range of motion. No tenderness. Lymphadenopathy:      Cervical: No cervical adenopathy. Skin:     General: Skin is warm. Capillary Refill: Capillary refill takes less than 2 seconds. Neurological:      Mental Status: He is alert.

## 2023-10-01 DIAGNOSIS — E87.1 HYPONATREMIA: ICD-10-CM

## 2023-10-02 RX ORDER — SODIUM CHLORIDE 1 G/1
1 TABLET ORAL DAILY
Qty: 30 TABLET | Refills: 1 | Status: SHIPPED | OUTPATIENT
Start: 2023-10-02

## 2023-10-04 ENCOUNTER — TELEPHONE (OUTPATIENT)
Dept: NEPHROLOGY | Facility: CLINIC | Age: 63
End: 2023-10-04

## 2023-10-04 NOTE — TELEPHONE ENCOUNTER
Ya Bates called into the office stating he needs his blood work scripts sent to [facility] as he cannot go to [facility] as it is not covered by his insurance.

## 2023-10-05 ENCOUNTER — TELEPHONE (OUTPATIENT)
Dept: NEPHROLOGY | Facility: CLINIC | Age: 63
End: 2023-10-05

## 2023-10-05 ENCOUNTER — TELEPHONE (OUTPATIENT)
Dept: OTHER | Facility: HOSPITAL | Age: 63
End: 2023-10-05

## 2023-10-05 NOTE — TELEPHONE ENCOUNTER
I received a message from patient about labs. He wants to go to [facility]. I have labs in there from my last visit that I do not see completed from 7/7, can you please discuss this with patient and arrange for him to have those completed before his next visit.

## 2023-10-05 NOTE — TELEPHONE ENCOUNTER
Patient aware of labs needing to be done prior to appointment. Lab orders faxed to [facility] in [place].

## 2023-10-05 NOTE — TELEPHONE ENCOUNTER
I spoke with Artist Lina yesterday regarding his blood work. I just faxed the lab scripts to a [address] [place] [address] facility closest to his residence.

## 2023-10-06 LAB
CREAT ?TM UR-SCNC: 86.5 UMOL/L
EXT ALBUMIN URINE RANDOM: 135.1
MICROALBUMIN/CREAT UR: 156 MG/G{CREAT}

## 2023-10-13 ENCOUNTER — OFFICE VISIT (OUTPATIENT)
Dept: NEPHROLOGY | Facility: CLINIC | Age: 63
End: 2023-10-13

## 2023-10-13 VITALS
WEIGHT: 254 LBS | HEIGHT: 72 IN | HEART RATE: 76 BPM | SYSTOLIC BLOOD PRESSURE: 128 MMHG | OXYGEN SATURATION: 96 % | BODY MASS INDEX: 34.4 KG/M2 | DIASTOLIC BLOOD PRESSURE: 70 MMHG

## 2023-10-13 DIAGNOSIS — N18.2 STAGE 2 CHRONIC KIDNEY DISEASE: ICD-10-CM

## 2023-10-13 DIAGNOSIS — R80.9 MICROALBUMINURIA: ICD-10-CM

## 2023-10-13 DIAGNOSIS — E87.1 HYPONATREMIA: ICD-10-CM

## 2023-10-13 DIAGNOSIS — R77.8 ABNORMAL SPEP: Primary | ICD-10-CM

## 2023-10-13 NOTE — PATIENT INSTRUCTIONS
Your sodium level is mildly low at 133. You are stable on the current regimen of salt tablets so continue 1 gram every other day. You have mild/moderate protein in the urine, continue same dose of lisinopril. If protein worsens, greater than 200-300mg per day, we can pursue additional treatment. You have an elevated protein in the blood and referral has been made to hematology/oncology to investigate. Follow up in 6 months with labs prior. Call with questions or concerns.

## 2023-10-15 NOTE — PROGRESS NOTES
Assessment & Plan:    1. Abnormal SPEP  -     Ambulatory Referral to Hematology / Oncology; Future  -     Urinalysis with microscopic; Future; Expected date: 04/17/2024  -     Uric acid; Future; Expected date: 04/17/2024  -     PTH, intact; Future; Expected date: 04/17/2024  -     Comprehensive metabolic panel; Future; Expected date: 04/17/2024  -     Magnesium; Future; Expected date: 04/17/2024  -     Albumin / creatinine urine ratio; Future; Expected date: 04/17/2024  -     Phosphorus; Future; Expected date: 04/17/2024    2. Microalbuminuria  -     Urinalysis with microscopic; Future; Expected date: 04/17/2024  -     Uric acid; Future; Expected date: 04/17/2024  -     PTH, intact; Future; Expected date: 04/17/2024  -     Comprehensive metabolic panel; Future; Expected date: 04/17/2024  -     Magnesium; Future; Expected date: 04/17/2024  -     Albumin / creatinine urine ratio; Future; Expected date: 04/17/2024  -     Phosphorus; Future; Expected date: 04/17/2024    3. Stage 2 chronic kidney disease  -     Urinalysis with microscopic; Future; Expected date: 04/17/2024  -     Uric acid; Future; Expected date: 04/17/2024  -     PTH, intact; Future; Expected date: 04/17/2024  -     Comprehensive metabolic panel; Future; Expected date: 04/17/2024  -     Magnesium; Future; Expected date: 04/17/2024  -     Albumin / creatinine urine ratio; Future; Expected date: 04/17/2024  -     Phosphorus; Future; Expected date: 04/17/2024    4. Hyponatremia  -     Urinalysis with microscopic; Future; Expected date: 04/17/2024  -     Uric acid; Future; Expected date: 04/17/2024  -     PTH, intact; Future; Expected date: 04/17/2024  -     Comprehensive metabolic panel; Future; Expected date: 04/17/2024  -     Magnesium; Future; Expected date: 04/17/2024  -     Albumin / creatinine urine ratio; Future; Expected date: 04/17/2024  -     Phosphorus;  Future; Expected date: 04/17/2024         Abnormal spep  Refer to heme/onc for further evaluation will need immunoglobulin. Faint band in gamma region suspicious for monoclonal protein. Upep no monclonal proteins. Awaiting result of spep/immunofixation/kappa/lambda ratio from labs. 2. Microalbuminuria-moderate. BP/potassium limits upward titration of lisinopril. No hx DM to warrant kerendia. No emergent need for SGTL-2i unless alb/cr ratio> 200mg/g cr. 3.CKD 2   Volume status appears euvolemic. Metabolic parameters stable. Referral to heme/onc for monoclonal gammopathy. Encourage hydration. Avoid NSAID. Goal BP<,130/80. Goal TCO2 > 22. Goal urine protein cr ratio<500mg/day. All parameters at goal.    Consider addition of SGLT-2I in the future if alb/cr ratio>200mg/day. Continue on low dose lisinopril, upward titration limited by potassium/BP. 4. Hyponatremia, mild, chronic, asymptomatic, felt to be due to excess fluid intake and potential contribution of SIADH. Cannot limit fluid further due to esophageal issues. Trends stable-132 -133 mmol/L on sodium chloride tablet 1 gram QOD. Continue to maintain. Avoid NSAIDs. Monoclonal gammopathy workup per heme onc. Follow up in 6 months. The benefits, risks and alternatives to the treatment plan were discussed at this visit. Patient was advised of common adverse effects of any medical therapies prescribed. All questions were answered and discussed with the patient and any accompanying family members or caretakers. Subjective:      Patient ID: Titi Aguirre is a 58 y.o. male presenting for follow up in the [facility] office. Patient last seen on 7/7/23 for microalbuminuria, essential HTN and hyponatremia. HPI    In the interim since last visit, denies any new medical conditions,surgeries,allergies or medications. He is tolerating 1 gram NaCl tablet QOD without side effect of worsening HTN or edema. Fluid intake remains unchanged.  Needs a high fluid intake with esophageal disorder, ~1 galloon per day.    Patient brought a papercopy of labs from October as they were not available to me. Sodium 133 on recent labs from October and 132 mmol/L prior to that . TSH/cortisol checked and were ok. Spep showed a faint band in the gamma region suspicious for monoclonal immunoglobulin. Denies hx cancer and does not see a hematologist.    Alb/cr ratio moderate and unchanged at ~150mg/g cr on spot ratio. ROS negative. The following portions of the patient's history were reviewed and updated as appropriate: allergies, current medications, past family history, past medical history, past social history, past surgical history, and problem list.    Review of Systems   Respiratory: Negative. Cardiovascular: Negative. Gastrointestinal: Negative. Genitourinary: Negative. Neurological: Negative. All other systems reviewed and are negative. Objective:      /70 (BP Location: Left arm, Patient Position: Sitting, Cuff Size: Large) Comment: 130/74 in right arm. Pulse 76   Ht 6' (1.829 m)   Wt 115 kg (254 lb)   SpO2 96%   BMI 34.45 kg/m²          Physical Exam  Vitals reviewed. Constitutional:       General: He is not in acute distress. HENT:      Head: Atraumatic. Mouth/Throat:      Mouth: Mucous membranes are moist.      Pharynx: Oropharynx is clear. Eyes:      General: No scleral icterus. Cardiovascular:      Rate and Rhythm: Normal rate and regular rhythm. Pulmonary:      Breath sounds: No wheezing, rhonchi or rales. Abdominal:      General: There is no distension. Palpations: Abdomen is soft. Tenderness: There is no abdominal tenderness. There is no guarding. Musculoskeletal:      Right lower leg: No edema. Left lower leg: No edema. Skin:     General: Skin is warm. Coloration: Skin is not jaundiced. Findings: No bruising or rash. Neurological:      General: No focal deficit present.       Mental Status: He is alert and oriented to person, place, and time. Mental status is at baseline. Cranial Nerves: No cranial nerve deficit.    Psychiatric:         Mood and Affect: Mood normal.         Behavior: Behavior normal.             Lab Results   Component Value Date    SODIUM 124 (L) 05/18/2023    K 4.7 05/18/2023    CL 97 05/18/2023    CO2 21 05/18/2023    AGAP 6 05/18/2023    BUN 22 05/18/2023    CREATININE 1.54 (H) 05/18/2023    GLUC 98 05/09/2023    GLUF 90 05/18/2023    CALCIUM 9.6 05/18/2023    AST 27 05/09/2023    ALT 24 05/09/2023    ALKPHOS 80 05/09/2023    TP 7.5 05/09/2023    TBILI 0.54 05/09/2023    EGFR 46 05/18/2023      Lab Results   Component Value Date    CREATININE 1.54 (H) 05/18/2023    CREATININE 1.51 (H) 05/16/2023    CREATININE 1.02 05/09/2023      Lab Results   Component Value Date    COLORU Light yellow, Good 06/06/2023    CLARITYU Clear 06/06/2023    SPECGRAV <=1.005 (L) 05/09/2023    PHUR 6.0 05/09/2023    LEUKOCYTESUR Negative 06/06/2023    NITRITE Negative 06/06/2023    PROTEIN UA Negative 06/06/2023    GLUCOSEU Negative 06/06/2023    KETONESU Negative 06/06/2023    UROBILINOGEN 1 06/06/2023    BILIRUBINUR Negative 06/06/2023    BLOODU Negative 06/06/2023      No results found for: "LABPROT"  No results found for: "Taurus Greaser", "[id number]"  Lab Results   Component Value Date    WBC 6.50 05/09/2023    HGB 13.7 05/09/2023    HCT 41.2 05/09/2023    MCV 86 05/09/2023     05/09/2023      Lab Results   Component Value Date    HGB 13.7 05/09/2023      No results found for: "IRON", "TIBC", "FERRITIN"   No results found for: "PTHCALCIUM", "[id number]", "PHOSPHORUS"   No results found for: "CHOLESTEROL", "HDL", "LDLCALC", "TRIG"   No results found for: "URICACID"   Lab Results   Component Value Date    HGBA1C 6.5 05/15/2023      No results found for: "TSHANTIBODY", "[id number]", "FREET4"   No results found for: "TAMMIE", "DSDNAAB", "RFIGM"   No results found for: "PROT", "UPEP", "IMMUNOFIX", "Anju Bennet", "KAPPALIGHT"     Portions of the record may have been created with voice recognition software. Occasional wrong word or "sound a like" substitutions may have occurred due to the inherent limitations of voice recognition software. Read the chart carefully and recognize, using context, where substitutions have occurred. If you have any questions, please contact the dictating provider.

## 2023-10-16 ENCOUNTER — TELEPHONE (OUTPATIENT)
Dept: HEMATOLOGY ONCOLOGY | Facility: CLINIC | Age: 63
End: 2023-10-16

## 2023-10-16 NOTE — TELEPHONE ENCOUNTER
I called Louie Blas in response to a referral that was received for patient to establish care with Hematology. Outreach was made to schedule a consultation. A consultation was scheduled for patient during this call.  Patient is scheduled on 11/16/23 at 9:00AM with Linda Craft at the [facility]

## 2023-10-17 ENCOUNTER — OFFICE VISIT (OUTPATIENT)
Dept: FAMILY MEDICINE CLINIC | Facility: CLINIC | Age: 63
End: 2023-10-17
Payer: COMMERCIAL

## 2023-10-17 VITALS
WEIGHT: 255.8 LBS | BODY MASS INDEX: 34.65 KG/M2 | HEIGHT: 72 IN | HEART RATE: 68 BPM | SYSTOLIC BLOOD PRESSURE: 154 MMHG | OXYGEN SATURATION: 97 % | DIASTOLIC BLOOD PRESSURE: 76 MMHG

## 2023-10-17 DIAGNOSIS — L82.0 INFLAMED SEBORRHEIC KERATOSIS: ICD-10-CM

## 2023-10-17 DIAGNOSIS — E66.9 OBESITY (BMI 30.0-34.9): ICD-10-CM

## 2023-10-17 DIAGNOSIS — E08.40 DIABETES MELLITUS DUE TO UNDERLYING CONDITION WITH DIABETIC NEUROPATHY, WITHOUT LONG-TERM CURRENT USE OF INSULIN (HCC): Primary | ICD-10-CM

## 2023-10-17 DIAGNOSIS — E87.1 HYPONATREMIA: ICD-10-CM

## 2023-10-17 PROBLEM — I10 ESSENTIAL HYPERTENSION: Status: ACTIVE | Noted: 2023-10-17

## 2023-10-17 LAB — SL AMB POCT HEMOGLOBIN AIC: 6.5 (ref ?–6.5)

## 2023-10-17 PROCEDURE — 17110 DESTRUCTION B9 LES UP TO 14: CPT | Performed by: INTERNAL MEDICINE

## 2023-10-17 PROCEDURE — 99214 OFFICE O/P EST MOD 30 MIN: CPT | Performed by: INTERNAL MEDICINE

## 2023-10-17 PROCEDURE — 99396 PREV VISIT EST AGE 40-64: CPT | Performed by: INTERNAL MEDICINE

## 2023-10-17 PROCEDURE — 83036 HEMOGLOBIN GLYCOSYLATED A1C: CPT | Performed by: INTERNAL MEDICINE

## 2023-10-17 RX ORDER — SODIUM CHLORIDE 1 G/1
1 TABLET ORAL EVERY OTHER DAY
Qty: 30 TABLET | Refills: 1 | Status: SHIPPED | OUTPATIENT
Start: 2023-10-17

## 2023-10-17 NOTE — ASSESSMENT & PLAN NOTE
Lab Results   Component Value Date    HGBA1C 6.5 10/17/2023   Excellent glycemic control. Continue metformin.

## 2023-10-17 NOTE — ASSESSMENT & PLAN NOTE
Sodium is at a safe level now on every other day sodium chloride tablets. Continue follow-up with nephrology.

## 2023-10-17 NOTE — PROGRESS NOTES
Assessment/Plan:    Problem List Items Addressed This Visit        Endocrine    Diabetes mellitus due to underlying condition with diabetic neuropathy, without long-term current use of insulin ([address]) - Primary       Lab Results   Component Value Date    HGBA1C 6.5 10/17/2023   Excellent glycemic control. Continue metformin. Relevant Orders    POCT hemoglobin A1c (Completed)       Other    Hyponatremia     Sodium is at a safe level now on every other day sodium chloride tablets. Continue follow-up with nephrology. Relevant Medications    sodium chloride 1 g tablet    Obesity (BMI 30.0-34. 9)     He is going to redouble his dietary efforts. Other Visit Diagnoses     Inflamed seborrheic keratosis        Relevant Orders    Lesion Destruction (Completed)        He had his flu shot. Advised to have a COVID booster. He sees the eye doctor annually. He goes to his dentist regularly as well. Chief Complaint    Physical Exam; Care Gap Request         Patient ID: Karen Chamorro is a 58 y.o. male who returns for a preventive visit. He has been seeing the nephrologist Dr. Ifeoma Caballero. Apparently he has a kappa light chain elevation and has been scheduled to see heme-onc next month. He hasn't had much success with weight loss since last visit. His sodium has been better controlled now that he is on sodium chloride tablets. He is not taking them every other day. He has a lesion on his right medial thigh that he wanted to see dermatology for. He thinks it is a skin tag and it does feel tender. Objective:    /76 (BP Location: Left arm, Cuff Size: Adult)   Pulse 68   Ht 6' (1.829 m)   Wt 116 kg (255 lb 12.8 oz)   SpO2 97%   BMI 34.69 kg/m²     Wt Readings from Last 3 Encounters:   10/17/23 116 kg (255 lb 12.8 oz)   10/13/23 115 kg (254 lb)   07/18/23 116 kg (255 lb 9.6 oz)     Physical Exam  Vitals reviewed. Constitutional:       General: He is not in acute distress.      Appearance: Normal appearance. He is well-developed. He is not ill-appearing. HENT:      Head: Normocephalic and atraumatic. Right Ear: Tympanic membrane and external ear normal.      Left Ear: Tympanic membrane and external ear normal.      Nose: Nose normal.      Mouth/Throat:      Mouth: Mucous membranes are moist.      Pharynx: Oropharynx is clear. Eyes:      General: No scleral icterus. Neck:      Thyroid: No thyromegaly. Vascular: No JVD. Cardiovascular:      Rate and Rhythm: Normal rate and regular rhythm. Heart sounds: Normal heart sounds. No murmur heard. No friction rub. No gallop. Pulmonary:      Breath sounds: Normal breath sounds. Abdominal:      General: Bowel sounds are normal. There is no distension. Palpations: Abdomen is soft. There is no mass. Musculoskeletal:         General: No swelling. Skin:     Comments: He has a waxy, tan, stuck on papule on the right medial distal thigh that has a daily raised skin tag-like protrusion on the superior aspect. Neurological:      Mental Status: He is alert. Psychiatric:         Mood and Affect: Mood normal.      Lesion Destruction    Date/Time: 10/17/2023 2:45 PM    Performed by: Christianne Arias MD  Authorized by: Christianne Arias MD  Universal Protocol:  Consent: Verbal consent obtained. Procedure Details - Lesion Destruction:     Number of Lesions:  1  Lesion 1:     Body area:  Lower extremity    Lower extremity location:  R upper leg    Initial size (mm):  3    Final defect size (mm):  3    Malignancy: benign lesion      Destruction method: cryotherapy       He tolerated 40 secs of Rapid-Freeze. Advised to keep the area clean and to inform us if he has any concerns about infection.

## 2023-10-17 NOTE — ASSESSMENT & PLAN NOTE
His blood pressure was slightly elevated today though it was normal at the nephrologist office recently. I asked him to make sure they check his blood pressure at the heme-onc visit. If remains elevated, he should get back to me and we will consider increasing his lisinopril to 10 mg daily.

## 2023-11-15 ENCOUNTER — DOCUMENTATION (OUTPATIENT)
Dept: HEMATOLOGY ONCOLOGY | Facility: CLINIC | Age: 63
End: 2023-11-15

## 2023-11-16 ENCOUNTER — OFFICE VISIT (OUTPATIENT)
Dept: HEMATOLOGY ONCOLOGY | Facility: CLINIC | Age: 63
End: 2023-11-16
Payer: COMMERCIAL

## 2023-11-16 VITALS
HEIGHT: 72 IN | DIASTOLIC BLOOD PRESSURE: 80 MMHG | SYSTOLIC BLOOD PRESSURE: 136 MMHG | BODY MASS INDEX: 33.32 KG/M2 | RESPIRATION RATE: 18 BRPM | HEART RATE: 71 BPM | TEMPERATURE: 97.7 F | WEIGHT: 246 LBS | OXYGEN SATURATION: 98 %

## 2023-11-16 DIAGNOSIS — R77.8 ABNORMAL SPEP: ICD-10-CM

## 2023-11-16 DIAGNOSIS — D47.2 MGUS (MONOCLONAL GAMMOPATHY OF UNKNOWN SIGNIFICANCE): Primary | ICD-10-CM

## 2023-11-16 PROCEDURE — 99204 OFFICE O/P NEW MOD 45 MIN: CPT | Performed by: NURSE PRACTITIONER

## 2023-11-16 NOTE — PROGRESS NOTES
Hematology/Oncology Outpatient Consultation  Jeninfer Mock 58 y.o. male 1960 [id number]    Date:  11/16/2023      Assessment and Plan:  1. MGUS (monoclonal gammopathy of unknown significance)  Patient's SPEP recently revealed faint band in the gamma region suspicious for monoclonal gammopathy however immunofixation was not done to confirm. UPEP was negative. He was also noted to have elevated kappa free light chain with slightly abnormal kappa to lambda ratio 2.35-this could be reactive however plasma cell dyscrasia not entirely ruled out. He does not seem to have any obvious hint of endorgan damage according to his most recent laboratory studies. Was told that we will pursue the work-up for plasma cell dyscrasia in its entirety including checking whole-body low-dose CT scan to evaluate for bony abnormalities/lesions. We will repeat SPEP and check immunofixation. We will also check his urine free light chains/immunoglobulins. He states that he is planning to get his laboratory studies this time at the [facility] to ensure results are available in a timely fashion. We will schedule another follow-up appointment in about 3 weeks from now to review the findings of his additional work-up. Was told that we will make a decision at his next office visit if we will need to pursue additional work-up with bone marrow biopsy versus close surveillance. Patient agrees to the plan. - CBC and differential; Future  - Comprehensive metabolic panel; Future  - LD,Blood; Future  - IgG, IgA, IgM; Future  - Immunoglobulin free LT chains blood; Future  - Protein electrophoresis, serum; Future  - Kappa / lambda light chains, urine, 24 hour; Future  - Protein electrophoresis, urine; Future  - Beta 2 microglobulin, serum; Future  - CT low dose whole body myeloma scan; Future    2. Abnormal SPEP  - CBC and differential; Future  - Comprehensive metabolic panel; Future  - LD,Blood; Future  - IgG, IgA, IgM;  Future  - Immunoglobulin free LT chains blood; Future  - Protein electrophoresis, serum; Future  - Kappa / lambda light chains, urine, 24 hour; Future  - Protein electrophoresis, urine; Future  - Beta 2 microglobulin, serum; Future  - CT low dose whole body myeloma scan; Future       HPI:  Patient is a pleasant 27-year-old male who presents accompanied by his wife for further evaluation of his abnormal SPEP/serum free light chains: referred by nephrology. He has past medical history of hypertension, diabetes, achalasia, GERD and chronic hyponatremia. He does admit to smoking cigars. Has decent family history of malignancies and a niece who harbors BRCA mutation; he did have genetic testing in the past which came back negative. He states that about 6 months ago he presented to the hospital with reports of significant back pain. The back pain resolved however since that time he has been referred to multiple specialists including urology, surgery nephrology. Establish care with a new PCP recently as well. Was found to have the abnormal SPEP/free light chains with nephrology work-up. He states that he is doing well today. Denies any constitutional symptoms. He did provide me with his most recent laboratory studies from [address] [place] [address] as all the results were not available on chart. Most recent labs 10/6/2023 showed sodium 133, creatinine 1.22, GFR 66, calcium normal 9.8 remaining metabolic panel is normal.  Urinalysis showed +1 protein. His serum protein electrophoresis showed a faint band in the gamma region suspicious for monoclonal protein however immunofixation was not performed. His urine protein electrophoresis was negative for monoclonal protein. TSH normal 0.53. Serum free light chain showed elevated kappa 52.7 mg/L, normal lambda 22.4 mg/L with slightly elevated kappa to lambda ratio 2.35.   Most recent CBC available for review 5/9/2023 showed normal white cells and platelets, he was not anemic H&H 13.7/41.2, MCV 86. ROS: Review of Systems   HENT:  Positive for hearing loss. Respiratory:  Positive for cough. Neurological:  Positive for numbness. Psychiatric/Behavioral:  Positive for sleep disturbance. All other systems reviewed and are negative.       Past Medical History:   Diagnosis Date    Bilateral flank pain 5/11/2023    Diabetes mellitus ([address])     Heart murmur     Hypertension        Past Surgical History:   Procedure Laterality Date    APPENDECTOMY N/A     JOINT REPLACEMENT Right     MYOTOMY HELLER LAPAROSCOPIC  2018    [facility]\""    STOMACH SURGERY         Social History     Socioeconomic History    Marital status: /Civil Union     Spouse name: None    Number of children: None    Years of education: None    Highest education level: None   Occupational History    None   Tobacco Use    Smoking status: Every Day     Types: Cigars    Smokeless tobacco: Never   Vaping Use    Vaping Use: Never used   Substance and Sexual Activity    Alcohol use: Yes     Comment: rarely    Drug use: Never    Sexual activity: Not Currently   Other Topics Concern    None   Social History Narrative    None     Social Determinants of Health     Financial Resource Strain: Not on file   Food Insecurity: Not on file   Transportation Needs: Not on file   Physical Activity: Not on file   Stress: Not on file   Social Connections: Not on file   Intimate Partner Violence: Not on file   Housing Stability: Not on file       Family History   Problem Relation Age of Onset    Cancer Mother         stomach    Heart attack Father     Cancer Sister         bladder    Cancer Brother         colon       No Known Allergies      Current Outpatient Medications:     amoxicillin (AMOXIL) 500 mg capsule, Take 2,000 mg by mouth as needed 1 hour prior to dental appointment, Disp: , Rfl:     aspirin 81 mg chewable tablet, Chew 81 mg daily, Disp: , Rfl:     atorvastatin (LIPITOR) 20 mg tablet, Take 20 mg by mouth daily, Disp: , Rfl:     esomeprazole (NexIUM) 40 MG capsule, TAKE 1 CAPSULE (40 MG TOTAL) BY MOUTH IN THE MORNING, Disp: 90 capsule, Rfl: 3    fenofibrate (TRICOR) 145 mg tablet, Take 145 mg by mouth daily, Disp: , Rfl:     lisinopril (ZESTRIL) 5 mg tablet, Take 1 tablet (5 mg total) by mouth daily, Disp: 90 tablet, Rfl: 1    metFORMIN (GLUCOPHAGE) 500 mg tablet, Take 500 mg by mouth 2 (two) times a day, Disp: , Rfl:     Omega-3 Fatty Acids (fish oil) 1,000 mg, Take 1,000 mg by mouth daily, Disp: , Rfl:     sodium chloride 1 g tablet, Take 1 tablet (1 g total) by mouth every other day, Disp: 30 tablet, Rfl: 1    Sodium Fluoride 5000 PPM 1.1 % PSTE, APPLY TO AFFECTED AREA NIGHTLY(NOT COVERED BY INSURANCE), Disp: , Rfl:       Physical Exam:  /80 (BP Location: Right arm, Patient Position: Sitting, Cuff Size: Adult)   Pulse 71   Temp 97.7 °F (36.5 °C)   Resp 18   Ht 6' (1.829 m)   Wt 112 kg (246 lb)   SpO2 98%   BMI 33.36 kg/m²     Physical Exam  Vitals reviewed. Constitutional:       General: He is not in acute distress. Appearance: He is well-developed. He is obese. He is not diaphoretic. HENT:      Head: Normocephalic and atraumatic. Eyes:      General: Lids are normal. No scleral icterus. Conjunctiva/sclera: Conjunctivae normal.      Pupils: Pupils are equal, round, and reactive to light. Neck:      Thyroid: No thyromegaly. Cardiovascular:      Rate and Rhythm: Normal rate and regular rhythm. Heart sounds: Normal heart sounds. No murmur heard. Pulmonary:      Effort: Pulmonary effort is normal. No respiratory distress. Breath sounds: Normal breath sounds. Abdominal:      General: There is no distension. Palpations: Abdomen is soft. There is no hepatomegaly or splenomegaly. Tenderness: There is no abdominal tenderness. Musculoskeletal:         General: No swelling. Normal range of motion. Cervical back: Normal range of motion and neck supple.    Lymphadenopathy:      Cervical: No cervical adenopathy. Upper Body:      Right upper body: No axillary adenopathy. Left upper body: No axillary adenopathy. Skin:     General: Skin is warm and dry. Findings: No erythema or rash. Neurological:      General: No focal deficit present. Mental Status: He is alert and oriented to person, place, and time. Psychiatric:         Mood and Affect: Mood and affect normal.         Behavior: Behavior normal. Behavior is cooperative. Thought Content: Thought content normal.         Judgment: Judgment normal.           Labs:  Lab Results   Component Value Date    WBC 6.50 05/09/2023    HGB 13.7 05/09/2023    HCT 41.2 05/09/2023    MCV 86 05/09/2023     05/09/2023        Patient voiced understanding and agreement in the above discussion. Aware to contact our office with questions/symptoms in the interim. This note has been generated by voice recognition software system. Therefore, there may be spelling, grammar, and or syntax errors. Please contact if questions arise.

## 2023-11-20 ENCOUNTER — APPOINTMENT (OUTPATIENT)
Age: 63
End: 2023-11-20
Payer: COMMERCIAL

## 2023-11-20 DIAGNOSIS — D47.2 MGUS (MONOCLONAL GAMMOPATHY OF UNKNOWN SIGNIFICANCE): ICD-10-CM

## 2023-11-20 DIAGNOSIS — R77.8 ABNORMAL SPEP: ICD-10-CM

## 2023-11-20 PROCEDURE — 82232 ASSAY OF BETA-2 PROTEIN: CPT

## 2023-11-20 PROCEDURE — 83521 IG LIGHT CHAINS FREE EACH: CPT

## 2023-11-20 PROCEDURE — 36415 COLL VENOUS BLD VENIPUNCTURE: CPT

## 2023-11-20 PROCEDURE — 86334 IMMUNOFIX E-PHORESIS SERUM: CPT

## 2023-11-20 PROCEDURE — 84166 PROTEIN E-PHORESIS/URINE/CSF: CPT

## 2023-11-20 PROCEDURE — 84165 PROTEIN E-PHORESIS SERUM: CPT

## 2023-11-20 PROCEDURE — 82784 ASSAY IGA/IGD/IGG/IGM EACH: CPT

## 2023-11-20 PROCEDURE — 85025 COMPLETE CBC W/AUTO DIFF WBC: CPT

## 2023-11-20 PROCEDURE — 83615 LACTATE (LD) (LDH) ENZYME: CPT

## 2023-11-20 PROCEDURE — 80053 COMPREHEN METABOLIC PANEL: CPT

## 2023-11-21 ENCOUNTER — TELEPHONE (OUTPATIENT)
Dept: HEMATOLOGY ONCOLOGY | Facility: CLINIC | Age: 63
End: 2023-11-21

## 2023-11-21 LAB
ALBUMIN SERPL BCP-MCNC: 4.6 G/DL (ref 3.5–5)
ALP SERPL-CCNC: 36 U/L (ref 34–104)
ALT SERPL W P-5'-P-CCNC: 45 U/L (ref 7–52)
ANION GAP SERPL CALCULATED.3IONS-SCNC: 10 MMOL/L
AST SERPL W P-5'-P-CCNC: 35 U/L (ref 13–39)
BASOPHILS # BLD AUTO: 0.06 THOUSANDS/ÂΜL (ref 0–0.1)
BASOPHILS NFR BLD AUTO: 1 % (ref 0–1)
BILIRUB SERPL-MCNC: 0.62 MG/DL (ref 0.2–1)
BUN SERPL-MCNC: 16 MG/DL (ref 5–25)
CALCIUM SERPL-MCNC: 9.8 MG/DL (ref 8.4–10.2)
CHLORIDE SERPL-SCNC: 96 MMOL/L (ref 96–108)
CO2 SERPL-SCNC: 26 MMOL/L (ref 21–32)
CREAT SERPL-MCNC: 1.19 MG/DL (ref 0.6–1.3)
EOSINOPHIL # BLD AUTO: 0.1 THOUSAND/ÂΜL (ref 0–0.61)
EOSINOPHIL NFR BLD AUTO: 1 % (ref 0–6)
ERYTHROCYTE [DISTWIDTH] IN BLOOD BY AUTOMATED COUNT: 13.2 % (ref 11.6–15.1)
GFR SERPL CREATININE-BSD FRML MDRD: 64 ML/MIN/1.73SQ M
GLUCOSE P FAST SERPL-MCNC: 105 MG/DL (ref 65–99)
HCT VFR BLD AUTO: 40.3 % (ref 36.5–49.3)
HGB BLD-MCNC: 13.2 G/DL (ref 12–17)
IGA SERPL-MCNC: 125 MG/DL (ref 66–433)
IGG SERPL-MCNC: 820 MG/DL (ref 635–1741)
IGM SERPL-MCNC: 643 MG/DL (ref 45–281)
IMM GRANULOCYTES # BLD AUTO: 0.03 THOUSAND/UL (ref 0–0.2)
IMM GRANULOCYTES NFR BLD AUTO: 0 % (ref 0–2)
LDH SERPL-CCNC: 141 U/L (ref 140–271)
LYMPHOCYTES # BLD AUTO: 1.84 THOUSANDS/ÂΜL (ref 0.6–4.47)
LYMPHOCYTES NFR BLD AUTO: 26 % (ref 14–44)
MCH RBC QN AUTO: 28.6 PG (ref 26.8–34.3)
MCHC RBC AUTO-ENTMCNC: 32.8 G/DL (ref 31.4–37.4)
MCV RBC AUTO: 87 FL (ref 82–98)
MONOCYTES # BLD AUTO: 0.66 THOUSAND/ÂΜL (ref 0.17–1.22)
MONOCYTES NFR BLD AUTO: 9 % (ref 4–12)
NEUTROPHILS # BLD AUTO: 4.39 THOUSANDS/ÂΜL (ref 1.85–7.62)
NEUTS SEG NFR BLD AUTO: 63 % (ref 43–75)
NRBC BLD AUTO-RTO: 0 /100 WBCS
OSMOLALITY UR: 193 MMOL/KG
PLATELET # BLD AUTO: 315 THOUSANDS/UL (ref 149–390)
PMV BLD AUTO: 11.4 FL (ref 8.9–12.7)
POTASSIUM SERPL-SCNC: 4.3 MMOL/L (ref 3.5–5.3)
PROT SERPL-MCNC: 7.5 G/DL (ref 6.4–8.4)
RBC # BLD AUTO: 4.62 MILLION/UL (ref 3.88–5.62)
SODIUM 24H UR-SCNC: 36 MOL/L
SODIUM SERPL-SCNC: 132 MMOL/L (ref 135–147)
WBC # BLD AUTO: 7.08 THOUSAND/UL (ref 4.31–10.16)

## 2023-11-21 NOTE — TELEPHONE ENCOUNTER
Returned call to spouse. Per spouse, 24 hour urine was ordered, but patient "filled jug" after 16 hours. Spouse inquiring if test should be repeated. Will review and return call if additional specimen is required.

## 2023-11-21 NOTE — TELEPHONE ENCOUNTER
Patient Call    Who are you speaking with? Spouse    If it is not the patient, are they listed on an active communication consent form? Yes   What is the reason for this call? Spouse calling to speak with Raquel Luis Miguel. She has a question regarding patient test results   Does this require a call back? yes   If a call back is required, please list best call back number [phone]   If a call back is required, advise that a message will be forwarded to their care team and someone will return their call as soon as possible. Did you relay this information to the patient?  Yes

## 2023-11-22 LAB
ALBUMIN SERPL ELPH-MCNC: 4.45 G/DL (ref 3.2–5.1)
ALBUMIN SERPL ELPH-MCNC: 60.2 % (ref 48–70)
ALBUMIN UR ELPH-MCNC: 100 %
ALPHA1 GLOB MFR UR ELPH: 0 %
ALPHA1 GLOB SERPL ELPH-MCNC: 0.32 G/DL (ref 0.15–0.47)
ALPHA1 GLOB SERPL ELPH-MCNC: 4.3 % (ref 1.8–7)
ALPHA2 GLOB MFR UR ELPH: 0 %
ALPHA2 GLOB SERPL ELPH-MCNC: 0.67 G/DL (ref 0.42–1.04)
ALPHA2 GLOB SERPL ELPH-MCNC: 9.1 % (ref 5.9–14.9)
B-GLOBULIN MFR UR ELPH: 0 %
BETA GLOB ABNORMAL SERPL ELPH-MCNC: 0.52 G/DL (ref 0.31–0.57)
BETA1 GLOB SERPL ELPH-MCNC: 7 % (ref 4.7–7.7)
BETA2 GLOB SERPL ELPH-MCNC: 5.8 % (ref 3.1–7.9)
BETA2+GAMMA GLOB SERPL ELPH-MCNC: 0.43 G/DL (ref 0.2–0.58)
GAMMA GLOB ABNORMAL SERPL ELPH-MCNC: 1.01 G/DL (ref 0.4–1.66)
GAMMA GLOB MFR UR ELPH: 0 %
GAMMA GLOB SERPL ELPH-MCNC: 13.6 % (ref 6.9–22.3)
IGG/ALB SER: 1.51 {RATIO} (ref 1.1–1.8)
INTERPRETATION UR IFE-IMP: NORMAL
KAPPA LC FREE SER-MCNC: 39.7 MG/L (ref 3.3–19.4)
KAPPA LC FREE/LAMBDA FREE SER: 1.95 {RATIO} (ref 0.26–1.65)
LAMBDA LC FREE SERPL-MCNC: 20.4 MG/L (ref 5.7–26.3)
PROT PATTERN SERPL ELPH-IMP: NORMAL
PROT PATTERN UR ELPH-IMP: NORMAL
PROT SERPL-MCNC: 7.4 G/DL (ref 6.4–8.2)
PROT UR-MCNC: 10.5 MG/DL

## 2023-11-22 PROCEDURE — 84166 PROTEIN E-PHORESIS/URINE/CSF: CPT | Performed by: STUDENT IN AN ORGANIZED HEALTH CARE EDUCATION/TRAINING PROGRAM

## 2023-11-22 PROCEDURE — 86334 IMMUNOFIX E-PHORESIS SERUM: CPT | Performed by: STUDENT IN AN ORGANIZED HEALTH CARE EDUCATION/TRAINING PROGRAM

## 2023-11-22 PROCEDURE — 84165 PROTEIN E-PHORESIS SERUM: CPT | Performed by: STUDENT IN AN ORGANIZED HEALTH CARE EDUCATION/TRAINING PROGRAM

## 2023-11-22 NOTE — TELEPHONE ENCOUNTER
Phoned lab. Reviewed specimen submitted was not 24 hour specimen. Lab will contact patient to recollect specimen and will instruct to provide at least 2 bottles for collection. Lab will also contact [facility] with information that specimen was not 24 hours.

## 2023-11-24 ENCOUNTER — HOSPITAL ENCOUNTER (OUTPATIENT)
Dept: CT IMAGING | Facility: HOSPITAL | Age: 63
End: 2023-11-24
Payer: COMMERCIAL

## 2023-11-24 DIAGNOSIS — D47.2 MGUS (MONOCLONAL GAMMOPATHY OF UNKNOWN SIGNIFICANCE): ICD-10-CM

## 2023-11-24 DIAGNOSIS — R77.8 ABNORMAL SPEP: ICD-10-CM

## 2023-11-24 PROCEDURE — 76497 UNLISTED CT PROCEDURE: CPT

## 2023-11-28 ENCOUNTER — APPOINTMENT (OUTPATIENT)
Age: 63
End: 2023-11-28
Payer: COMMERCIAL

## 2023-11-28 ENCOUNTER — TELEPHONE (OUTPATIENT)
Dept: HEMATOLOGY ONCOLOGY | Facility: CLINIC | Age: 63
End: 2023-11-28

## 2023-11-28 DIAGNOSIS — D47.2 MGUS (MONOCLONAL GAMMOPATHY OF UNKNOWN SIGNIFICANCE): ICD-10-CM

## 2023-11-28 DIAGNOSIS — R77.8 ABNORMAL SPEP: ICD-10-CM

## 2023-11-28 PROCEDURE — 83521 IG LIGHT CHAINS FREE EACH: CPT

## 2023-11-28 NOTE — TELEPHONE ENCOUNTER
Received call from Radiology. CT scan results available.   Patient with appt 12/6 to review results with provider

## 2023-11-30 LAB
KAPPA LC UR-MCNC: 7.95 MG/L (ref 1.17–86.46)
KAPPA LC/LAMBDA UR: 8.28 {RATIO} (ref 1.83–14.26)
LAMBDA LC UR-MCNC: 0.96 MG/L (ref 0.27–15.21)

## 2023-12-02 LAB — B2 MICROGLOB SERPL-MCNC: 2.6 MG/L (ref 0.6–2.4)

## 2023-12-06 ENCOUNTER — OFFICE VISIT (OUTPATIENT)
Dept: HEMATOLOGY ONCOLOGY | Facility: CLINIC | Age: 63
End: 2023-12-06
Payer: COMMERCIAL

## 2023-12-06 VITALS
HEIGHT: 72 IN | BODY MASS INDEX: 32.64 KG/M2 | WEIGHT: 241 LBS | TEMPERATURE: 98 F | RESPIRATION RATE: 18 BRPM | DIASTOLIC BLOOD PRESSURE: 72 MMHG | HEART RATE: 73 BPM | OXYGEN SATURATION: 96 % | SYSTOLIC BLOOD PRESSURE: 126 MMHG

## 2023-12-06 DIAGNOSIS — D47.2 MGUS (MONOCLONAL GAMMOPATHY OF UNKNOWN SIGNIFICANCE): Primary | ICD-10-CM

## 2023-12-06 DIAGNOSIS — R91.1 LUNG NODULE: ICD-10-CM

## 2023-12-06 PROCEDURE — 99214 OFFICE O/P EST MOD 30 MIN: CPT | Performed by: NURSE PRACTITIONER

## 2023-12-06 NOTE — PROGRESS NOTES
Hematology/Oncology Outpatient Follow-up  Raciel Sanodval 58 y.o. male 1960 [id number]    Date:  12/6/2023      Assessment and Plan:  1. MGUS (monoclonal gammopathy of unknown significance)  Patient was recently found to have monoclonal gammopathy on additional work-up done by his nephrologist.  He seems to have IgM kappa monoclonal gammopathy amount is not impressive 0.18 g/dL. No obvious hint of endorgan damage on his recent work-up. Did discuss with patient we could consider doing a baseline bone marrow biopsy to know the exact amount of plasma cells in the bone marrow. However it also would not be unreasonable to continue to monitor him and his laboratory studies closely since the amount is not impressive and he is without any endorgan damage. Patient chose the latter. Recommended he follow-up again with repeat labs in about 6 months from now or definitely sooner should there be any need. - CBC and differential; Future  - Comprehensive metabolic panel; Future  - LD,Blood; Future  - IgG, IgA, IgM; Future  - Immunoglobulin free LT chains blood; Future  - Protein electrophoresis, serum; Future  - Kappa / lambda light chains, urine, 24 hour; Future  - Protein electrophoresis, urine; Future    2. Lung nodule  Patient's baseline low-dose whole-body CT scan incidentally showed 6 mm nonspecific right upper lobe pulmonary. Radiology recommends 6 to 12-month follow-up noncontrast CT scan which we will order and schedule to be done before his next office visit. - CT chest wo contrast; Future       HPI:  Patient is a pleasant 59-year-old male who originally presented for further evaluation of his abnormal SPEP/serum free light chains: referred by nephrology. He has past medical history of hypertension, diabetes, achalasia, GERD and chronic hyponatremia. He does admit to smoking cigars.   Has decent family history of malignancies and a niece who harbors BRCA mutation; he did have genetic testing in the past which came back negative. He states that around 5/2023 he presented to the hospital with reports of significant back pain. The back pain resolved however since that time he has been referred to multiple specialists including urology, surgery nephrology due to incidental findings. Established care with a new PCP recently as well. Was found to have the abnormal SPEP/free light chains with nephrology work-up. Labs 10/6/2023 showed sodium 133, creatinine 1.22, GFR 66, calcium normal 9.8 remaining metabolic panel is normal.  Urinalysis showed +1 protein. His serum protein electrophoresis showed a faint band in the gamma region suspicious for monoclonal protein however immunofixation was not performed. His urine protein electrophoresis was negative for monoclonal protein. TSH normal 0.53. Serum free light chain showed elevated kappa 52.7 mg/L, normal lambda 22.4 mg/L with slightly elevated kappa to lambda ratio 2.35. Most recent CBC available for review 5/9/2023 showed normal white cells and platelets, he was not anemic H&H 13.7/41.2, MCV 86. Interval history:  Patient presents today for a follow-up visit accompanied by his wife to review the findings of his initial work-up. He has no new complaints today. His additional work-up done 11/20/2023 which showed normal CBC hemoglobin 13.2. Creatinine 1.19, GFR 64, calcium 9.8. He has chronic hyponatremia sodium 132, glucose 105 remaining metabolic panel is appropriate. LDH is normal.  His IgM immunoglobulin is elevated 643 with normal IgA and IgG. Serum free light chain showed elevated kappa 39.7 mg/L with normal lambda normal ratio 1.95. SPEP showed abnormal distribution in the gamma region; immunofixation rate clinical myopathy IgM kappa 0.18 g/dL. Beta-2 microglobulin slightly higher 2.6. Urine protein electrophoresis was negative for monoclonal protein.   His urine free light chains are normal.    CT low dose whole body myeloma scan (11/24/23): IMPRESSION:  WHOLE BODY LOW DOSE CT FOR EVALUATION OF MULTIPLE MYELOMA:  OSTEOLYTIC LESIONS: None. PROBABLY MALIGNANT VERTEBRAL FRACTURE: None. SUSPICIOUS PERIPHERAL MEDULLARY PATTERN: No.     6 mm nonspecific right upper lobe pulmonary nodule. Based on current Fleischner Society 2017 Guidelines on incidental pulmonary nodule, followup non-contrast CT is recommended at 6-12 months from the initial examination and, if stable at that time, an   additional followup is recommended for 18-24 months from the initial examination. ROS: Review of Systems   HENT:  Positive for hearing loss and trouble swallowing (Due to achalasia). Neurological:  Positive for dizziness (mild) and numbness. All other systems reviewed and are negative.       Past Medical History:   Diagnosis Date    Bilateral flank pain 5/11/2023    Diabetes mellitus ([address])     Heart murmur     Hypertension        Past Surgical History:   Procedure Laterality Date    APPENDECTOMY N/A     JOINT REPLACEMENT Right     MYOTOMY HELLER LAPAROSCOPIC  2018    [facility]    STOMACH SURGERY         Social History     Socioeconomic History    Marital status: /Civil Union     Spouse name: None    Number of children: None    Years of education: None    Highest education level: None   Occupational History    None   Tobacco Use    Smoking status: Every Day     Types: Cigars    Smokeless tobacco: Never   Vaping Use    Vaping Use: Never used   Substance and Sexual Activity    Alcohol use: Yes     Comment: rarely    Drug use: Never    Sexual activity: Not Currently   Other Topics Concern    None   Social History Narrative    None     Social Determinants of Health     Financial Resource Strain: Not on file   Food Insecurity: Not on file   Transportation Needs: Not on file   Physical Activity: Not on file   Stress: Not on file   Social Connections: Not on file   Intimate Partner Violence: Not on file   Housing Stability: Not on file       Family History Problem Relation Age of Onset    Cancer Mother         stomach    Heart attack Father     Cancer Sister         bladder    Cancer Brother         colon       No Known Allergies      Current Outpatient Medications:     amoxicillin (AMOXIL) 500 mg capsule, Take 2,000 mg by mouth as needed 1 hour prior to dental appointment, Disp: , Rfl:     aspirin 81 mg chewable tablet, Chew 81 mg daily, Disp: , Rfl:     atorvastatin (LIPITOR) 20 mg tablet, Take 20 mg by mouth daily, Disp: , Rfl:     esomeprazole (NexIUM) 40 MG capsule, TAKE 1 CAPSULE (40 MG TOTAL) BY MOUTH IN THE MORNING, Disp: 90 capsule, Rfl: 3    fenofibrate (TRICOR) 145 mg tablet, Take 145 mg by mouth daily, Disp: , Rfl:     lisinopril (ZESTRIL) 5 mg tablet, Take 1 tablet (5 mg total) by mouth daily, Disp: 90 tablet, Rfl: 1    metFORMIN (GLUCOPHAGE) 500 mg tablet, Take 500 mg by mouth 2 (two) times a day, Disp: , Rfl:     Omega-3 Fatty Acids (fish oil) 1,000 mg, Take 1,000 mg by mouth daily, Disp: , Rfl:     sodium chloride 1 g tablet, Take 1 tablet (1 g total) by mouth every other day, Disp: 30 tablet, Rfl: 1    Sodium Fluoride 5000 PPM 1.1 % PSTE, APPLY TO AFFECTED AREA NIGHTLY(NOT COVERED BY INSURANCE), Disp: , Rfl:       Physical Exam:  /72 (BP Location: Left arm, Patient Position: Sitting, Cuff Size: Adult)   Pulse 73   Temp 98 °F (36.7 °C)   Resp 18   Ht 6' (1.829 m)   Wt 109 kg (241 lb)   SpO2 96%   BMI 32.69 kg/m²     Physical Exam  Vitals reviewed. Constitutional:       General: He is not in acute distress. Appearance: He is well-developed. He is obese. He is not diaphoretic. HENT:      Head: Normocephalic and atraumatic. Eyes:      General: Lids are normal. No scleral icterus. Conjunctiva/sclera: Conjunctivae normal.      Pupils: Pupils are equal, round, and reactive to light. Neck:      Thyroid: No thyromegaly. Cardiovascular:      Rate and Rhythm: Normal rate and regular rhythm. Heart sounds: Normal heart sounds. No murmur heard. Pulmonary:      Effort: Pulmonary effort is normal. No respiratory distress. Breath sounds: Normal breath sounds. Abdominal:      General: There is no distension. Palpations: Abdomen is soft. There is no hepatomegaly or splenomegaly. Tenderness: There is no abdominal tenderness. Musculoskeletal:         General: No swelling. Normal range of motion. Cervical back: Normal range of motion and neck supple. Lymphadenopathy:      Cervical: No cervical adenopathy. Upper Body:      Right upper body: No axillary adenopathy. Left upper body: No axillary adenopathy. Skin:     General: Skin is warm and dry. Findings: No erythema or rash. Neurological:      General: No focal deficit present. Mental Status: He is alert and oriented to person, place, and time. Psychiatric:         Mood and Affect: Mood and affect normal.         Behavior: Behavior normal. Behavior is cooperative. Thought Content: Thought content normal.         Judgment: Judgment normal.           Labs:  Lab Results   Component Value Date    WBC 7.08 11/20/2023    HGB 13.2 11/20/2023    HCT 40.3 11/20/2023    MCV 87 11/20/2023     11/20/2023        Patient voiced understanding and agreement in the above discussion. Aware to contact our office with questions/symptoms in the interim. This note has been generated by voice recognition software system. Therefore, there may be spelling, grammar, and or syntax errors. Please contact if questions arise.

## 2023-12-20 DIAGNOSIS — K21.9 GASTROESOPHAGEAL REFLUX DISEASE, UNSPECIFIED WHETHER ESOPHAGITIS PRESENT: ICD-10-CM

## 2023-12-20 DIAGNOSIS — K22.0 ACHALASIA: ICD-10-CM

## 2023-12-20 RX ORDER — ESOMEPRAZOLE MAGNESIUM 40 MG/1
40 CAPSULE, DELAYED RELEASE ORAL DAILY
Qty: 90 CAPSULE | Refills: 3 | Status: SHIPPED | OUTPATIENT
Start: 2023-12-20

## 2024-01-12 ENCOUNTER — OFFICE VISIT (OUTPATIENT)
Dept: CARDIOLOGY CLINIC | Facility: CLINIC | Age: 64
End: 2024-01-12
Payer: COMMERCIAL

## 2024-01-12 VITALS
DIASTOLIC BLOOD PRESSURE: 76 MMHG | SYSTOLIC BLOOD PRESSURE: 122 MMHG | WEIGHT: 236 LBS | HEART RATE: 86 BPM | BODY MASS INDEX: 31.97 KG/M2 | HEIGHT: 72 IN

## 2024-01-12 DIAGNOSIS — I10 ESSENTIAL HYPERTENSION: ICD-10-CM

## 2024-01-12 DIAGNOSIS — R00.2 PALPITATION: Primary | ICD-10-CM

## 2024-01-12 DIAGNOSIS — I10 HTN (HYPERTENSION): ICD-10-CM

## 2024-01-12 DIAGNOSIS — E78.5 DYSLIPIDEMIA: ICD-10-CM

## 2024-01-12 DIAGNOSIS — I48.0 PAROXYSMAL A-FIB (HCC): ICD-10-CM

## 2024-01-12 DIAGNOSIS — E08.40 DIABETES MELLITUS DUE TO UNDERLYING CONDITION WITH DIABETIC NEUROPATHY, WITHOUT LONG-TERM CURRENT USE OF INSULIN (HCC): ICD-10-CM

## 2024-01-12 PROBLEM — Z82.49 FAMILY HISTORY OF CHRONIC ISCHEMIC HEART DISEASE: Status: ACTIVE | Noted: 2024-01-12

## 2024-01-12 PROCEDURE — 99204 OFFICE O/P NEW MOD 45 MIN: CPT | Performed by: INTERNAL MEDICINE

## 2024-01-12 PROCEDURE — 93000 ELECTROCARDIOGRAM COMPLETE: CPT | Performed by: INTERNAL MEDICINE

## 2024-01-12 RX ORDER — LISINOPRIL 5 MG/1
5 TABLET ORAL DAILY
Qty: 90 TABLET | Refills: 5 | Status: SHIPPED | OUTPATIENT
Start: 2024-01-12

## 2024-01-12 RX ORDER — ATORVASTATIN CALCIUM 40 MG/1
40 TABLET, FILM COATED ORAL EVERY EVENING
Qty: 90 TABLET | Refills: 5 | Status: SHIPPED | OUTPATIENT
Start: 2024-01-12

## 2024-01-12 NOTE — PATIENT INSTRUCTIONS
For now stop the fish oil.  New prescription for atorvastatin otherwise known as Lipitor at a higher dose and take it in the evening.    Blood test for cholesterol etc. but do not get it for 3 months from now.

## 2024-01-12 NOTE — ASSESSMENT & PLAN NOTE
Diet has improved.  I like to see how he does on a higher dose of Lipitor but holding the fish oil.  Fasting lipid profile in 3 months.

## 2024-01-12 NOTE — PROGRESS NOTES
" Patient ID: Robert Benson is a 63 y.o. male.        Plan:      Family history of chronic ischemic heart disease  For this reason appropriately on statin tx.    Essential hypertension  Well controlled.    Dyslipidemia  Diet has improved.  I like to see how he does on a higher dose of Lipitor but holding the fish oil.  Fasting lipid profile in 3 months.    Paroxysmal A-fib (HCC)  Last episode was in 2019.  He took Eliquis for a few years before this was discontinued.  He wears a smart watch and has had absolutely no recurrence.     Follow up Plan/Other summary comments:  Return in about 1 year (around 1/12/2025).  Smoking cessation encouraged.    HPI: Patient is seen today to establish care.  He has moved from the [place] area and is gathering physicians locally.  There is a strong family history of early CAD.  Over the years patient has had lots of testing to examine for CAD.  The most recent was a CAT scan angiogram on 8/29/2013 which was essentially normal.  A PET stress test in 2023 was also normal.  There has been no recent chest pain or chest pressure.  No palpitations syncope or near syncope.      Results for orders placed or performed in visit on 01/12/24   POCT ECG    Impression    NSR. RBBB. Left axis.          Most recent or relevant cardiac/vascular testing:    PET stress test 2/21/2023: Normal.    Coronary CTA 8/29/2013: Normal.      Past Surgical History:   Procedure Laterality Date    APPENDECTOMY N/A     JOINT REPLACEMENT Right     MYOTOMY HELLER LAPAROSCOPIC  2018    [place]    STOMACH SURGERY         Lipid Profile: No results found for: \"CHOL\", \"TRIG\", \"HDL\", \"LDL\"      Review of Systems   10  point ROS  was otherwise non pertinent or negative except as per HPI or as below.   Gait: Normal.        Objective:     /76   Pulse 86   Ht 6' (1.829 m)   Wt 107 kg (236 lb)   BMI 32.01 kg/m²     PHYSICAL EXAM:    General:  Normal appearance in no distress.  Eyes:  Anicteric.  Oral mucosa:  " Moist.  Neck:  No JVD. Carotid upstrokes are brisk without bruits.  No masses.  Chest:  Clear to auscultation.  Cardiac:  No palpable PMI.  Normal S1 and S2.  No murmur gallop or rub.  Abdomen:  Soft and nontender. No palpable organomegaly or aortic enlargement.  Extremities:  No peripheral edema.  Musculoskeletal:  Symmetric.   Vascular:  Femoral pulses are brisk without bruits.  Popliteal pulses are intact bilaterally.   Pedal pulses are intact.  Neuro:  Grossly symmetric.  Psych:  Alert and oriented x3.        Current Outpatient Medications:     aspirin 81 mg chewable tablet, Chew 81 mg daily, Disp: , Rfl:     atorvastatin (LIPITOR) 40 mg tablet, Take 1 tablet (40 mg total) by mouth every evening, Disp: 90 tablet, Rfl: 5    esomeprazole (NexIUM) 40 MG capsule, Take 1 capsule (40 mg total) by mouth in the morning, Disp: 90 capsule, Rfl: 3    fenofibrate (TRICOR) 145 mg tablet, Take 145 mg by mouth daily, Disp: , Rfl:     lisinopril (ZESTRIL) 5 mg tablet, Take 1 tablet (5 mg total) by mouth daily, Disp: 90 tablet, Rfl: 5    metFORMIN (GLUCOPHAGE) 500 mg tablet, Take 500 mg by mouth 2 (two) times a day, Disp: , Rfl:     sodium chloride 1 g tablet, Take 1 tablet (1 g total) by mouth every other day, Disp: 30 tablet, Rfl: 1    amoxicillin (AMOXIL) 500 mg capsule, Take 2,000 mg by mouth as needed 1 hour prior to dental appointment (Patient not taking: Reported on 1/12/2024), Disp: , Rfl:     Sodium Fluoride 5000 PPM 1.1 % PSTE, APPLY TO AFFECTED AREA NIGHTLY(NOT COVERED BY INSURANCE), Disp: , Rfl:   No Known Allergies  Past Medical History:   Diagnosis Date    Bilateral flank pain 05/11/2023    CAD (coronary artery disease)     nonobstructive RCA via CT 2013    Diabetes mellitus ([facility])     Heart murmur     Hyperlipidemia     Hypertension     PAF (paroxysmal atrial fibrillation) ([facility])            Social History     Tobacco Use   Smoking Status Every Day    Types: Cigars   Smokeless Tobacco Never

## 2024-01-12 NOTE — ASSESSMENT & PLAN NOTE
Last episode was in 2019.  He took Eliquis for a few years before this was discontinued.  He wears a smart watch and has had absolutely no recurrence.

## 2024-01-23 ENCOUNTER — OFFICE VISIT (OUTPATIENT)
Dept: FAMILY MEDICINE CLINIC | Facility: CLINIC | Age: 64
End: 2024-01-23
Payer: COMMERCIAL

## 2024-01-23 VITALS
WEIGHT: 238.8 LBS | SYSTOLIC BLOOD PRESSURE: 150 MMHG | OXYGEN SATURATION: 98 % | HEART RATE: 88 BPM | DIASTOLIC BLOOD PRESSURE: 70 MMHG | BODY MASS INDEX: 32.34 KG/M2 | HEIGHT: 72 IN

## 2024-01-23 DIAGNOSIS — R80.9 TYPE 2 DIABETES MELLITUS WITH MICROALBUMINURIA, WITHOUT LONG-TERM CURRENT USE OF INSULIN: Primary | ICD-10-CM

## 2024-01-23 DIAGNOSIS — E78.5 DYSLIPIDEMIA: ICD-10-CM

## 2024-01-23 DIAGNOSIS — I10 ESSENTIAL HYPERTENSION: ICD-10-CM

## 2024-01-23 DIAGNOSIS — E66.9 OBESITY (BMI 30.0-34.9): ICD-10-CM

## 2024-01-23 DIAGNOSIS — E08.40 DIABETES MELLITUS DUE TO UNDERLYING CONDITION WITH DIABETIC NEUROPATHY, WITHOUT LONG-TERM CURRENT USE OF INSULIN (HCC): ICD-10-CM

## 2024-01-23 DIAGNOSIS — E11.29 TYPE 2 DIABETES MELLITUS WITH MICROALBUMINURIA, WITHOUT LONG-TERM CURRENT USE OF INSULIN: Primary | ICD-10-CM

## 2024-01-23 DIAGNOSIS — E87.1 HYPONATREMIA: ICD-10-CM

## 2024-01-23 DIAGNOSIS — I48.0 PAROXYSMAL A-FIB (HCC): ICD-10-CM

## 2024-01-23 PROCEDURE — 99214 OFFICE O/P EST MOD 30 MIN: CPT | Performed by: INTERNAL MEDICINE

## 2024-01-23 NOTE — ASSESSMENT & PLAN NOTE
His previous sodium level was 132. Sodium level will be rechecked. He is to continue taking sodium chloride tablets every other day. A follow-up appointment with the nephrologist has been scheduled.

## 2024-01-23 NOTE — ASSESSMENT & PLAN NOTE
His blood pressure remains elevated despite repeated measurements at the clinic. The patient was advised to persist with his dietary regimen for weight loss, which can contribute to blood pressure reduction.     Ongoing blood pressure monitoring is necessary, with a target systolic pressure below 140 mmHg. The patient is instructed to record his blood pressure readings on the MyChart flow sheet once or twice weekly, 3 hours post-antihypertensive medication administration. If the readings persistently exceed the target, adjustments to his treatment plan will be considered.

## 2024-01-23 NOTE — PROGRESS NOTES
Assessment/Plan:    Problem List Items Addressed This Visit        Endocrine    Diabetes mellitus, type II (HCC) - Primary    Relevant Medications    metFORMIN (GLUCOPHAGE) 500 mg tablet    Other Relevant Orders    Basic metabolic panel    Hemoglobin A1C    Diabetes mellitus due to underlying condition with diabetic neuropathy, without long-term current use of insulin (HCC)    Relevant Medications    metFORMIN (GLUCOPHAGE) 500 mg tablet       Cardiovascular and Mediastinum    Paroxysmal A-fib (HCC)     No active issues reported.         Essential hypertension     His blood pressure remains elevated despite repeated measurements at the clinic. The patient was advised to persist with his dietary regimen for weight loss, which can contribute to blood pressure reduction.     Ongoing blood pressure monitoring is necessary, with a target systolic pressure below 140 mmHg. The patient is instructed to record his blood pressure readings on the [place] flow sheet once or twice weekly, 3 hours post-antihypertensive medication administration. If the readings persistently exceed the target, adjustments to his treatment plan will be considered.         Relevant Orders    [employer] bp flowsheet       Other    Hyponatremia     His previous sodium level was 132. Sodium level will be rechecked. He is to continue taking sodium chloride tablets every other day. A follow-up appointment with the nephrologist has been scheduled.          Obesity (BMI 30.0-34.9)    Dyslipidemia    Relevant Orders    Lipid panel     Diabetes.   He is scheduled for blood work within the next 1 to 2 weeks. His current treatment plan includes continuation of Metformin 500 mg twice daily. Patient is advised to maintain dietary modifications already implemented.      History of knee arthroplasty.  He has a history of knee arthroplasty performed 9 years ago. Continuation of care with an orthopedic specialist is not necessary unless complications  arise.    Follow-up.  He is scheduled for a follow-up visit in 6 months.      Chief Complaint    Care Gap Request; Follow-up         Patient ID: Robert Benson is a 63 y.o. male who returns for routine follow up.    His current cardiologist, Dr Arndt, recommended a follow-up regarding his Metformin regimen, which was initially prescribed by his previous cardiologist. He is currently on 500 mg of Metformin, administered twice daily. His glycemic control is satisfactory, with the last A1c reading being within the normal range. He is scheduled for comprehensive blood tests in approximately 2 months as part of his follow-up with the cardiology and hematology departments.    He has been actively pursuing weight loss, implementing dietary changes such as portion control and carbohydrate reduction. Snacks, including ice cream and potato chips, have been eliminated from his diet. Ice cream has been replaced with Alexys's cup. His weight in 10/2023 was 255 pounds, and he has since lost 17 pounds.    His dentist prescribed Amoxicillin prior to dental prophylaxis. He currently does not have an orthopedic physician. Approximately 9 years ago, he had a knee replacement surgery.    His dentist gives him a prescription for amoxicillin. He does not have an orthopedic doctor currently. He had a knee replaced about 9 years ago.     He is on sodium chloride tablets 1 gm every other day. His latest  blood laboratory was in 11/2023. His last sodium count was 132 mg which has greatly improved since his last  check up.    He is under the care of Dr Arndt for atrial fibrillation. He reports no symptoms of tachycardia or palpitations. Approximately a year ago, following an atrial fibrillation episode, he acquired a smart watch to monitor his heart rhythm. He uses it to check his heart rate whenever he feels unwell. There have been no recorded instances of recurrent atrial fibrillation. His current medication regimen includes sodium  chloride tablets, 1 gm, administered every other day.    His subsequent nephrology appointment is scheduled for 04/2024. His spouse possesses a home blood pressure monitor for regular checks. A colonoscopy is due, with the last one performed on 03/2021.    He has a family history of heart disease.    Objective:    /70 (BP Location: Right arm, Cuff Size: Large)   Pulse 88   Ht 6' (1.829 m)   Wt 108 kg (238 lb 12.8 oz)   SpO2 98%   BMI 32.39 kg/m²     Wt Readings from Last 6 Encounters:   01/23/24 108 kg (238 lb 12.8 oz)   01/12/24 107 kg (236 lb)   12/06/23 109 kg (241 lb)   11/16/23 112 kg (246 lb)   10/17/23 116 kg (255 lb 12.8 oz)   10/13/23 115 kg (254 lb)         Physical Exam    General:  Well-developed, well-nourished, in no acute distress.  Cardiac:  Regular rate and rhythm, no murmur, gallop, or rub.  There is no JVD or HJR.  Lungs:  Clear to auscultation and percussion.  Abdomen:  Soft, nontender, normoactive bowel sounds, no palpable masses, no hepatosplenomegaly.  Extremities:  No clubbing, cyanosis, or edema.      Transcribed for Srinivas Esquivel MD, by Chris Kramer on 01/24/24 at 8:40 AM. Powered by [employer].

## 2024-04-22 ENCOUNTER — TELEPHONE (OUTPATIENT)
Age: 64
End: 2024-04-22

## 2024-04-22 NOTE — TELEPHONE ENCOUNTER
Scheduled date of EGD/colonoscopy (as of today): 07/16/2024  Physician performing EGD/colonoscopy: Dr. Barksdale  Location of EGD/colonoscopy: [facility]  Desired bowel prep reviewed with patient: SUSANA/DUL  Prep instructions sent via [employer]  Instructions reviewed with patient by: SHYANNE  Clearances: pt is diabetic, AM apt required

## 2024-04-22 NOTE — TELEPHONE ENCOUNTER
04/22/24  Screened by: Liset Jose    Referring Provider recall    Pre- Screening: Yes    There is no height or weight on file to calculate BMI.  Has patient been referred for a routine screening Colonoscopy? yes  Is the patient between 45-75 years old? yes      Previous Colonoscopy yes   If yes:    Date: 03/05/2021    Facility:     Reason:     Does the patient want to see a Gastroenterologist prior to their procedure OR are they having any GI symptoms? no    Has the patient been hospitalized or had abdominal surgery in the past 6 months? no    Does the patient use supplemental oxygen? no    Does the patient take Coumadin, Lovenox, Plavix, Elliquis, Xarelto, or other blood thinning medication? no    Has the patient had a stroke, cardiac event, or stent placed in the past year? no

## 2024-04-29 ENCOUNTER — TELEPHONE (OUTPATIENT)
Dept: NEPHROLOGY | Facility: CLINIC | Age: 64
End: 2024-04-29

## 2024-04-29 NOTE — PATIENT INSTRUCTIONS
You are to go to the [address] [place] [address] ED now for higher level of care and evaluation    [facility] does not offer the level of care you need  You are to NOT eat, drink or void until you are seen in the ED  You area to find a PCP for follow up care 0 = understands/communicates without difficulty

## 2024-05-02 ENCOUNTER — APPOINTMENT (OUTPATIENT)
Age: 64
End: 2024-05-02
Payer: MEDICARE

## 2024-05-02 DIAGNOSIS — R80.9 MICROALBUMINURIA: ICD-10-CM

## 2024-05-02 DIAGNOSIS — R77.8 ABNORMAL SPEP: ICD-10-CM

## 2024-05-02 DIAGNOSIS — E78.5 DYSLIPIDEMIA: ICD-10-CM

## 2024-05-02 DIAGNOSIS — E87.1 HYPONATREMIA: ICD-10-CM

## 2024-05-02 DIAGNOSIS — R80.9 TYPE 2 DIABETES MELLITUS WITH MICROALBUMINURIA, WITHOUT LONG-TERM CURRENT USE OF INSULIN (HCC): ICD-10-CM

## 2024-05-02 DIAGNOSIS — N18.2 STAGE 2 CHRONIC KIDNEY DISEASE: ICD-10-CM

## 2024-05-02 DIAGNOSIS — E11.29 TYPE 2 DIABETES MELLITUS WITH MICROALBUMINURIA, WITHOUT LONG-TERM CURRENT USE OF INSULIN (HCC): ICD-10-CM

## 2024-05-02 DIAGNOSIS — D47.2 MGUS (MONOCLONAL GAMMOPATHY OF UNKNOWN SIGNIFICANCE): ICD-10-CM

## 2024-05-02 LAB
ALBUMIN SERPL BCP-MCNC: 4.2 G/DL (ref 3.5–5)
ALP SERPL-CCNC: 32 U/L (ref 34–104)
ALT SERPL W P-5'-P-CCNC: 29 U/L (ref 7–52)
ANION GAP SERPL CALCULATED.3IONS-SCNC: 10 MMOL/L (ref 4–13)
AST SERPL W P-5'-P-CCNC: 23 U/L (ref 13–39)
BACTERIA UR QL AUTO: ABNORMAL /HPF
BASOPHILS # BLD AUTO: 0.06 THOUSANDS/ÂΜL (ref 0–0.1)
BASOPHILS NFR BLD AUTO: 1 % (ref 0–1)
BILIRUB SERPL-MCNC: 0.48 MG/DL (ref 0.2–1)
BILIRUB UR QL STRIP: NEGATIVE
BUN SERPL-MCNC: 19 MG/DL (ref 5–25)
CALCIUM SERPL-MCNC: 9 MG/DL (ref 8.4–10.2)
CHLORIDE SERPL-SCNC: 99 MMOL/L (ref 96–108)
CHOLEST SERPL-MCNC: 161 MG/DL
CLARITY UR: CLEAR
CO2 SERPL-SCNC: 24 MMOL/L (ref 21–32)
COLOR UR: ABNORMAL
CORTIS AM PEAK SERPL-MCNC: 12.6 UG/DL (ref 6.7–22.6)
CREAT SERPL-MCNC: 1.35 MG/DL (ref 0.6–1.3)
CREAT UR-MCNC: 94.9 MG/DL
CREAT UR-MCNC: 94.9 MG/DL
EOSINOPHIL # BLD AUTO: 0.17 THOUSAND/ÂΜL (ref 0–0.61)
EOSINOPHIL NFR BLD AUTO: 3 % (ref 0–6)
ERYTHROCYTE [DISTWIDTH] IN BLOOD BY AUTOMATED COUNT: 14.3 % (ref 11.6–15.1)
GFR SERPL CREATININE-BSD FRML MDRD: 54 ML/MIN/1.73SQ M
GLUCOSE P FAST SERPL-MCNC: 103 MG/DL (ref 65–99)
GLUCOSE UR STRIP-MCNC: NEGATIVE MG/DL
HCT VFR BLD AUTO: 36.7 % (ref 36.5–49.3)
HDLC SERPL-MCNC: 31 MG/DL
HGB BLD-MCNC: 11.8 G/DL (ref 12–17)
HGB UR QL STRIP.AUTO: NEGATIVE
IGA SERPL-MCNC: 114 MG/DL (ref 66–433)
IGG SERPL-MCNC: 714 MG/DL (ref 635–1741)
IGM SERPL-MCNC: 637 MG/DL (ref 45–281)
IMM GRANULOCYTES # BLD AUTO: 0.01 THOUSAND/UL (ref 0–0.2)
IMM GRANULOCYTES NFR BLD AUTO: 0 % (ref 0–2)
KETONES UR STRIP-MCNC: NEGATIVE MG/DL
LDH SERPL-CCNC: 157 U/L (ref 140–271)
LDLC SERPL CALC-MCNC: 105 MG/DL (ref 0–100)
LEUKOCYTE ESTERASE UR QL STRIP: NEGATIVE
LYMPHOCYTES # BLD AUTO: 1.88 THOUSANDS/ÂΜL (ref 0.6–4.47)
LYMPHOCYTES NFR BLD AUTO: 32 % (ref 14–44)
MAGNESIUM SERPL-MCNC: 1.9 MG/DL (ref 1.9–2.7)
MCH RBC QN AUTO: 27.8 PG (ref 26.8–34.3)
MCHC RBC AUTO-ENTMCNC: 32.2 G/DL (ref 31.4–37.4)
MCV RBC AUTO: 86 FL (ref 82–98)
MICROALBUMIN UR-MCNC: 116.9 MG/L
MICROALBUMIN/CREAT 24H UR: 123 MG/G CREATININE (ref 0–30)
MONOCYTES # BLD AUTO: 0.61 THOUSAND/ÂΜL (ref 0.17–1.22)
MONOCYTES NFR BLD AUTO: 11 % (ref 4–12)
NEUTROPHILS # BLD AUTO: 3.1 THOUSANDS/ÂΜL (ref 1.85–7.62)
NEUTS SEG NFR BLD AUTO: 53 % (ref 43–75)
NITRITE UR QL STRIP: NEGATIVE
NON-SQ EPI CELLS URNS QL MICRO: ABNORMAL /HPF
NONHDLC SERPL-MCNC: 130 MG/DL
NRBC BLD AUTO-RTO: 0 /100 WBCS
OSMOLALITY UR/SERPL-RTO: 290 MMOL/KG (ref 282–298)
PH UR STRIP.AUTO: 7 [PH]
PHOSPHATE SERPL-MCNC: 3.6 MG/DL (ref 2.3–4.1)
PLATELET # BLD AUTO: 258 THOUSANDS/UL (ref 149–390)
PMV BLD AUTO: 10.4 FL (ref 8.9–12.7)
POTASSIUM SERPL-SCNC: 4.5 MMOL/L (ref 3.5–5.3)
PROT SERPL-MCNC: 6.7 G/DL (ref 6.4–8.4)
PROT UR STRIP-MCNC: ABNORMAL MG/DL
PTH-INTACT SERPL-MCNC: 60.3 PG/ML (ref 12–88)
RBC # BLD AUTO: 4.25 MILLION/UL (ref 3.88–5.62)
RBC #/AREA URNS AUTO: ABNORMAL /HPF
SODIUM SERPL-SCNC: 133 MMOL/L (ref 135–147)
SP GR UR STRIP.AUTO: 1.01 (ref 1–1.03)
TRIGL SERPL-MCNC: 123 MG/DL
TSH SERPL DL<=0.05 MIU/L-ACNC: 1.33 UIU/ML (ref 0.45–4.5)
URATE SERPL-MCNC: 5.1 MG/DL (ref 3.5–8.5)
UROBILINOGEN UR STRIP-ACNC: 2 MG/DL
WBC # BLD AUTO: 5.83 THOUSAND/UL (ref 4.31–10.16)
WBC #/AREA URNS AUTO: ABNORMAL /HPF

## 2024-05-02 PROCEDURE — 84550 ASSAY OF BLOOD/URIC ACID: CPT

## 2024-05-02 PROCEDURE — 82570 ASSAY OF URINE CREATININE: CPT

## 2024-05-02 PROCEDURE — 36415 COLL VENOUS BLD VENIPUNCTURE: CPT

## 2024-05-02 PROCEDURE — 80061 LIPID PANEL: CPT

## 2024-05-02 PROCEDURE — 84165 PROTEIN E-PHORESIS SERUM: CPT

## 2024-05-02 PROCEDURE — 84100 ASSAY OF PHOSPHORUS: CPT

## 2024-05-02 PROCEDURE — 86334 IMMUNOFIX E-PHORESIS SERUM: CPT

## 2024-05-02 PROCEDURE — 83036 HEMOGLOBIN GLYCOSYLATED A1C: CPT

## 2024-05-02 PROCEDURE — 82784 ASSAY IGA/IGD/IGG/IGM EACH: CPT

## 2024-05-02 PROCEDURE — 82043 UR ALBUMIN QUANTITATIVE: CPT

## 2024-05-02 PROCEDURE — 81001 URINALYSIS AUTO W/SCOPE: CPT

## 2024-05-02 PROCEDURE — 83970 ASSAY OF PARATHORMONE: CPT

## 2024-05-02 PROCEDURE — 84166 PROTEIN E-PHORESIS/URINE/CSF: CPT

## 2024-05-02 PROCEDURE — 83615 LACTATE (LD) (LDH) ENZYME: CPT

## 2024-05-02 PROCEDURE — 84443 ASSAY THYROID STIM HORMONE: CPT

## 2024-05-02 PROCEDURE — 83735 ASSAY OF MAGNESIUM: CPT

## 2024-05-02 PROCEDURE — 83521 IG LIGHT CHAINS FREE EACH: CPT

## 2024-05-02 PROCEDURE — 83930 ASSAY OF BLOOD OSMOLALITY: CPT

## 2024-05-02 PROCEDURE — 82533 TOTAL CORTISOL: CPT

## 2024-05-02 PROCEDURE — 85025 COMPLETE CBC W/AUTO DIFF WBC: CPT

## 2024-05-02 PROCEDURE — 80053 COMPREHEN METABOLIC PANEL: CPT

## 2024-05-03 ENCOUNTER — APPOINTMENT (OUTPATIENT)
Age: 64
End: 2024-05-03
Payer: COMMERCIAL

## 2024-05-03 DIAGNOSIS — D47.2 MGUS (MONOCLONAL GAMMOPATHY OF UNKNOWN SIGNIFICANCE): ICD-10-CM

## 2024-05-03 LAB
EST. AVERAGE GLUCOSE BLD GHB EST-MCNC: 151 MG/DL
HBA1C MFR BLD: 6.9 %
KAPPA LC FREE SER-MCNC: 62 MG/L (ref 3.3–19.4)
KAPPA LC FREE/LAMBDA FREE SER: 2.52 {RATIO} (ref 0.26–1.65)
LAMBDA LC FREE SERPL-MCNC: 24.6 MG/L (ref 5.7–26.3)

## 2024-05-03 PROCEDURE — 83521 IG LIGHT CHAINS FREE EACH: CPT

## 2024-05-04 LAB
ALBUMIN SERPL ELPH-MCNC: 4.15 G/DL (ref 3.2–5.1)
ALBUMIN SERPL ELPH-MCNC: 62 % (ref 48–70)
ALBUMIN UR ELPH-MCNC: 100 %
ALPHA1 GLOB MFR UR ELPH: 0 %
ALPHA1 GLOB SERPL ELPH-MCNC: 0.27 G/DL (ref 0.15–0.47)
ALPHA1 GLOB SERPL ELPH-MCNC: 4.1 % (ref 1.8–7)
ALPHA2 GLOB MFR UR ELPH: 0 %
ALPHA2 GLOB SERPL ELPH-MCNC: 0.52 G/DL (ref 0.42–1.04)
ALPHA2 GLOB SERPL ELPH-MCNC: 7.8 % (ref 5.9–14.9)
B-GLOBULIN MFR UR ELPH: 0 %
BETA GLOB ABNORMAL SERPL ELPH-MCNC: 0.5 G/DL (ref 0.31–0.57)
BETA1 GLOB SERPL ELPH-MCNC: 7.4 % (ref 4.7–7.7)
BETA2 GLOB SERPL ELPH-MCNC: 5.5 % (ref 3.1–7.9)
BETA2+GAMMA GLOB SERPL ELPH-MCNC: 0.37 G/DL (ref 0.2–0.58)
GAMMA GLOB ABNORMAL SERPL ELPH-MCNC: 0.88 G/DL (ref 0.4–1.66)
GAMMA GLOB MFR UR ELPH: 0 %
GAMMA GLOB SERPL ELPH-MCNC: 13.2 % (ref 6.9–22.3)
IGG/ALB SER: 1.63 {RATIO} (ref 1.1–1.8)
INTERPRETATION UR IFE-IMP: NORMAL
M PROTEIN 1 MFR SERPL ELPH: 1.6 %
M PROTEIN 1 SERPL ELPH-MCNC: 0.11 G/DL
PROT PATTERN SERPL ELPH-IMP: NORMAL
PROT PATTERN UR ELPH-IMP: NORMAL
PROT SERPL-MCNC: 6.7 G/DL (ref 6.4–8.2)
PROT UR-MCNC: 21.6 MG/DL

## 2024-05-04 PROCEDURE — 84165 PROTEIN E-PHORESIS SERUM: CPT | Performed by: STUDENT IN AN ORGANIZED HEALTH CARE EDUCATION/TRAINING PROGRAM

## 2024-05-04 PROCEDURE — 86334 IMMUNOFIX E-PHORESIS SERUM: CPT | Performed by: STUDENT IN AN ORGANIZED HEALTH CARE EDUCATION/TRAINING PROGRAM

## 2024-05-04 PROCEDURE — 84166 PROTEIN E-PHORESIS/URINE/CSF: CPT | Performed by: STUDENT IN AN ORGANIZED HEALTH CARE EDUCATION/TRAINING PROGRAM

## 2024-05-07 LAB
KAPPA LC UR-MCNC: 2.94 MG/L (ref 1.17–86.46)
KAPPA LC/LAMBDA UR: >4.26 {RATIO} (ref 1.83–14.26)
LAMBDA LC UR-MCNC: <0.69 MG/L (ref 0.27–15.21)

## 2024-05-08 PROBLEM — N18.2 CHRONIC KIDNEY DISEASE (CKD), STAGE II (MILD): Status: ACTIVE | Noted: 2024-05-08

## 2024-05-08 PROBLEM — R80.9 MICROALBUMINURIA: Status: ACTIVE | Noted: 2024-05-08

## 2024-05-08 NOTE — PROGRESS NOTES
"OFFICE FOLLOW UP - Nephrology   Robert Benson 64 y.o. male MRN: [id number]       ASSESSMENT/PLAN:  ***    Diagnoses and all orders for this visit:    Hyponatremia    Essential hypertension    Chronic kidney disease (CKD), stage II (mild)    Microalbuminuria    Type 2 diabetes mellitus with diabetic microalbuminuria, without long-term current use of insulin (HCC)    Hyponatremia  Sodium 133 mmol/L, stable, 5/2  Avoid NSAIDs and nephrotoxins.  Stay well-hydrated.  Previous nephrology note indicates need for high fluid intake with esophageal disorder of about 1 gallon per day.  Continue sodium chloride 1 g every other day    Essential hypertension  Office blood pressure ***  Home blood pressure  Continue home blood pressure monitoring  Goal blood pressure less than 130/80 mmHg  Continue lisinopril 5 mg daily    Stage II chronic kidney disease, baseline creatinine  Creatinine 1.35 mg/dL, GFR 54 mL/min, 5/2.  Previous referral to hematology/oncology for monoclonal gammopathy workup.  Hematology noted \"IgM kappa monoclonal gammopathy amount is not impressive 0.18 g/dL.  No obvious sign of endorgan damage.\"  Follow-up with heme-onc in 6 months with repeat labs***  Avoid NSAIDs.  Encourage hydration.  Continue lisinopril 5 mg daily    Microalbuminuria  UACR 123 mg/g, acceptable, 5/2  Goal UPCR less than 500 mg/day  Continue lisinopril 5 mg daily    Diabetes mellitus type 2  HA1C 6.9%, elevated, 5/2  Currently taking metformin 500 mg twice daily  Continue management as per primary care ***    PATIENT INSTRUCTIONS:  There are no Patient Instructions on file for this visit.    HPI: Robert Benson is a 64 y.o. male who is seen in [place] office for follow-up of hyponatremia, essential hypertension, stage II chronic kidney disease, and microalbuminuria.  PMH includes paroxysmal A-fib, achalasia, cholelithiasis, diabetes mellitus type 2, obesity, and dyslipidemia.   ***.    ***Denies recent hospitalizations, emergency " department visits or illnesses.  States has been feeling well overall.    ROS:   Denies headaches, dizziness, lightheadedness, blurred vision, chest pain, shortness of breath, nausea, vomiting, diarrhea, hematuria, dysuria, back pain, flank pain, abdominal pain, changes in urinary amount or frequency, edema or cramping. ***    A complete review of systems was done. Pertinent positives and negatives as noted in the HPI, otherwise the review of systems is negative.    Allergies: Patient has no known allergies.    Medications:   Current Outpatient Medications:     aspirin 81 mg chewable tablet, Chew 81 mg daily, Disp: , Rfl:     atorvastatin (LIPITOR) 40 mg tablet, Take 1 tablet (40 mg total) by mouth every evening, Disp: 90 tablet, Rfl: 5    esomeprazole (NexIUM) 40 MG capsule, Take 1 capsule (40 mg total) by mouth in the morning, Disp: 90 capsule, Rfl: 3    fenofibrate (TRICOR) 145 mg tablet, Take 145 mg by mouth daily, Disp: , Rfl:     lisinopril (ZESTRIL) 5 mg tablet, Take 1 tablet (5 mg total) by mouth daily, Disp: 90 tablet, Rfl: 5    metFORMIN (GLUCOPHAGE) 500 mg tablet, Take 1 tablet (500 mg total) by mouth 2 (two) times a day, Disp: 180 tablet, Rfl: 3    sodium chloride 1 g tablet, Take 1 tablet (1 g total) by mouth every other day, Disp: 30 tablet, Rfl: 1    Sodium Fluoride 5000 PPM 1.1 % PSTE, APPLY TO AFFECTED AREA NIGHTLY(NOT COVERED BY INSURANCE), Disp: , Rfl:     Past Medical History:   Diagnosis Date    Bilateral flank pain 05/11/2023    CAD (coronary artery disease)     nonobstructive RCA via CT 2013    Diabetes mellitus (HCC)     Heart murmur     Hyperlipidemia     Hypertension     PAF (paroxysmal atrial fibrillation) (HCC)      Past Surgical History:   Procedure Laterality Date    APPENDECTOMY N/A     JOINT REPLACEMENT Right     MYOTOMY HELLER LAPAROSCOPIC  2018    [place]    STOMACH SURGERY       Family History   Problem Relation Age of Onset    Cancer Mother         stomach    Heart attack Father   "   Cancer Sister         bladder    Cancer Brother         colon      reports that he has been smoking cigars. He has never used smokeless tobacco. He reports current alcohol use. He reports that he does not use drugs.      Physical Exam:   There were no vitals filed for this visit.  There is no height or weight on file to calculate BMI.    General: no acute distress   Eyes: conjunctivae pink, anicteric sclerae  ENT: mucous membranes moist  Neck: supple, no JVD  Chest: clear to auscultation bilaterally with no wheezes, rale or rhochi  CVS: regular rate and rhythm   Abdomen: soft, non-tender, non-distended  Extremities: no lower extremity edema   Skin: no rash  Neuro: awake and alert       Lab Results:  Results for orders placed or performed in visit on 05/03/24   [place] / lambda light chains, urine, 24 hour   Result Value Ref Range    Free [place] Lt Chains,Ur 2.94 1.17 - 86.46 mg/L    Free Lambda Lt Chains,Ur <0.69 0.27 - 15.21 mg/L    Kappa/Lambda Ratio,U >4.26 1.83 - 14.26       Results from last 7 days   Lab Units 05/02/24  0735   WBC Thousand/uL 5.83   HEMOGLOBIN g/dL 11.8*   HEMATOCRIT % 36.7   PLATELETS Thousands/uL 258   SODIUM mmol/L 133*   POTASSIUM mmol/L 4.5   CHLORIDE mmol/L 99   CO2 mmol/L 24   BUN mg/dL 19   CREATININE mg/dL 1.35*   CALCIUM mg/dL 9.0   MAGNESIUM mg/dL 1.9   PHOSPHORUS mg/dL 3.6         Portions of the record may have been created with voice recognition software. Occasional wrong word or \"sound a like\" substitutions may have occurred due to the inherent limitations of voice recognition software. Read the chart carefully and recognize, using context, where substitutions have occurred.If you have any questions, please contact the dictating provider.   "

## 2024-05-09 ENCOUNTER — OFFICE VISIT (OUTPATIENT)
Dept: NEPHROLOGY | Facility: CLINIC | Age: 64
End: 2024-05-09
Payer: MEDICARE

## 2024-05-09 VITALS
WEIGHT: 242 LBS | OXYGEN SATURATION: 97 % | HEART RATE: 71 BPM | DIASTOLIC BLOOD PRESSURE: 70 MMHG | HEIGHT: 72 IN | SYSTOLIC BLOOD PRESSURE: 120 MMHG | BODY MASS INDEX: 32.78 KG/M2

## 2024-05-09 DIAGNOSIS — N18.2 CHRONIC KIDNEY DISEASE (CKD), STAGE II (MILD): ICD-10-CM

## 2024-05-09 DIAGNOSIS — R80.9 TYPE 2 DIABETES MELLITUS WITH DIABETIC MICROALBUMINURIA, WITHOUT LONG-TERM CURRENT USE OF INSULIN (HCC): ICD-10-CM

## 2024-05-09 DIAGNOSIS — N40.0 BPH (BENIGN PROSTATIC HYPERPLASIA): ICD-10-CM

## 2024-05-09 DIAGNOSIS — E87.1 HYPONATREMIA: Primary | ICD-10-CM

## 2024-05-09 DIAGNOSIS — R80.9 MICROALBUMINURIA: ICD-10-CM

## 2024-05-09 DIAGNOSIS — I10 ESSENTIAL HYPERTENSION: ICD-10-CM

## 2024-05-09 DIAGNOSIS — D47.2 MGUS (MONOCLONAL GAMMOPATHY OF UNKNOWN SIGNIFICANCE): ICD-10-CM

## 2024-05-09 DIAGNOSIS — E11.29 TYPE 2 DIABETES MELLITUS WITH DIABETIC MICROALBUMINURIA, WITHOUT LONG-TERM CURRENT USE OF INSULIN (HCC): ICD-10-CM

## 2024-05-09 PROCEDURE — 99214 OFFICE O/P EST MOD 30 MIN: CPT | Performed by: INTERNAL MEDICINE

## 2024-05-09 PROCEDURE — G2211 COMPLEX E/M VISIT ADD ON: HCPCS | Performed by: INTERNAL MEDICINE

## 2024-05-09 RX ORDER — TAMSULOSIN HYDROCHLORIDE 0.4 MG/1
0.4 CAPSULE ORAL
Qty: 30 CAPSULE | Refills: 1 | Status: SHIPPED | OUTPATIENT
Start: 2024-05-09

## 2024-05-09 RX ORDER — SODIUM CHLORIDE 1 G/1
1 TABLET ORAL EVERY OTHER DAY
Qty: 45 TABLET | Refills: 1 | Status: SHIPPED | OUTPATIENT
Start: 2024-05-09

## 2024-05-09 NOTE — PATIENT INSTRUCTIONS
Start flomax 0.4 mg per day for urine rention.  Discussed addition of Jardiance for kidney protection, will touch base with your family doc first to see if we need to adjust the metformin first.   Sodium level is stable.   Follow up in 6 months.   If we start Jardiance we will check blood work in 2 weeks. If we dont, then from a kidney perspective we would check blood work in 3 months.

## 2024-05-12 NOTE — PROGRESS NOTES
Assessment & Plan:    1. Hyponatremia  -     sodium chloride 1 g tablet; Take 1 tablet (1 g total) by mouth every other day  -     Uric acid; Future; Expected date: 07/24/2024  -     PTH, intact; Future; Expected date: 07/24/2024  -     Albumin / creatinine urine ratio; Future; Expected date: 07/24/2024  -     Comprehensive metabolic panel; Future; Expected date: 07/24/2024    2. Essential hypertension  -     tamsulosin (FLOMAX) 0.4 mg; Take 1 capsule (0.4 mg total) by mouth daily with dinner  -     Uric acid; Future; Expected date: 07/24/2024  -     PTH, intact; Future; Expected date: 07/24/2024  -     Albumin / creatinine urine ratio; Future; Expected date: 07/24/2024  -     Comprehensive metabolic panel; Future; Expected date: 07/24/2024    3. Chronic kidney disease (CKD), stage II (mild)  -     Uric acid; Future; Expected date: 07/24/2024  -     PTH, intact; Future; Expected date: 07/24/2024  -     Albumin / creatinine urine ratio; Future; Expected date: 07/24/2024  -     Comprehensive metabolic panel; Future; Expected date: 07/24/2024  -     Urinalysis with microscopic    4. Microalbuminuria  -     Uric acid; Future; Expected date: 07/24/2024  -     PTH, intact; Future; Expected date: 07/24/2024  -     Albumin / creatinine urine ratio; Future; Expected date: 07/24/2024  -     Comprehensive metabolic panel; Future; Expected date: 07/24/2024  -     Urinalysis with microscopic    5. Type 2 diabetes mellitus with diabetic microalbuminuria, without long-term current use of insulin (HCC)  -     Uric acid; Future; Expected date: 07/24/2024  -     PTH, intact; Future; Expected date: 07/24/2024  -     Albumin / creatinine urine ratio; Future; Expected date: 07/24/2024  -     Comprehensive metabolic panel; Future; Expected date: 07/24/2024    6. MGUS (monoclonal gammopathy of unknown significance)    7. BPH (benign prostatic hyperplasia)  -     tamsulosin (FLOMAX) 0.4 mg; Take 1 capsule (0.4 mg total) by mouth daily with  dinner           Hyponatremia, mild,asymptomatic at present. Suspected to be 2/2 SIADH. Although, no recent serum osm and patient has MGUS.   Volume status euvolemic.  Continue salt tablet 1 gram QOD.   Sodium 133, stable.    2. Essential HTN  BP trends well controlled, goal<130/80.   Discussed potential orthostatics that could occur with addition of flomax.      3. CKD2    Reviewed CKD stages, Cr and eGFR trends and eGFR meaning in simple terms.  Cr 1.35 with eGFR 54 ml/min 5/2. Baseline low 1s.   Metabolic parameters stable-sodium 133.   Volume status compensated.    TT primary if SGLT-2i addition would be appropriate.  No wound issues, DKA, UTI/yeast infections.   If Jardiance added, will check chem in 2 weeks. Will monitor sodium trends closely if /when added.      4. Microalbuminuria suspected 2/2 DKD.  TT primary if SGLT-2i addition would be appropriate.  No wound issues, DKA, UTI/yeast infections.   If Jardiance added, will check chem in 2 weeks. Will monitor sodium trends closely if /when added.    5. Type 2 DM, well controlled, A1C<7 --6.9 5/2.  Monitor proteinuria. TT primary if SGLT-2i addition would be appropriate.  No wound issues, DKA, UTI/yeast infections.     6. MGUS--IgM-kappa. Reviewed recent testing. Defer to heme/onc if bone marrow biopsy would be appropriate.     7. BPH   Trial flomax 0.4mg qhs. Explained orthostatic hypotension symptoms that could occur with initial use.     Patient will need ongoing complex care for CKD management.    The benefits, risks and alternatives to the treatment plan were discussed at this visit. Patient was advised of common adverse effects of any medical therapies prescribed. All questions were answered and discussed with the patient and any accompanying family members or caretakers.      Subjective:      Patient ID: Robert Benson is a 64 y.o. male presenting for follow up in the [place] office for sodium and CKD management.    HPI    In the interim since last  visit, seen by hematology/oncology regarding MGUS. Elected for surveillance for IgM-kappa.    Reports issues with nocturia and urine stream that has bene ongoing attributed to prostate issues.     Denies LUTS/hematuria/dysuria otherwise.    BP well controlled. Not following BP at home.    No persistent GI issues. Denies lightheadedness/dizziness.     The following portions of the patient's history were reviewed and updated as appropriate: allergies, current medications, past family history, past medical history, past social history, past surgical history, and problem list.    Review of Systems   Constitutional: Negative.    Cardiovascular: Negative.    Gastrointestinal: Negative.    Genitourinary:  Positive for frequency.   Neurological: Negative.    All other systems reviewed and are negative.        Objective:      /70 (BP Location: Left arm, Patient Position: Sitting, Cuff Size: Standard)   Pulse 71   Ht 6' (1.829 m)   Wt 110 kg (242 lb)   SpO2 97%   BMI 32.82 kg/m²          Physical Exam  Vitals reviewed.   Constitutional:       General: He is not in acute distress.     Appearance: He is not ill-appearing.   HENT:      Head: Normocephalic and atraumatic.      Mouth/Throat:      Mouth: Mucous membranes are dry.   Eyes:      Extraocular Movements: Extraocular movements intact.      Conjunctiva/sclera: Conjunctivae normal.   Cardiovascular:      Rate and Rhythm: Normal rate and regular rhythm.      Heart sounds:      No friction rub.   Pulmonary:      Breath sounds: No wheezing, rhonchi or rales.   Abdominal:      Palpations: Abdomen is soft.      Tenderness: There is no abdominal tenderness. There is no guarding.   Musculoskeletal:      Right lower leg: No edema.      Left lower leg: No edema.   Neurological:      General: No focal deficit present.      Mental Status: He is alert and oriented to person, place, and time.             Lab Results   Component Value Date    SODIUM 133 (L) 05/02/2024    K 4.5  "05/02/2024    CL 99 05/02/2024    CO2 24 05/02/2024    AGAP 10 05/02/2024    BUN 19 05/02/2024    CREATININE 1.35 (H) 05/02/2024    GLUC 98 05/09/2023    GLUF 103 (H) 05/02/2024    CALCIUM 9.0 05/02/2024    AST 23 05/02/2024    ALT 29 05/02/2024    ALKPHOS 32 (L) 05/02/2024    TP 6.7 05/02/2024    TP 6.7 05/02/2024    TBILI 0.48 05/02/2024    EGFR 54 05/02/2024      Lab Results   Component Value Date    CREATININE 1.35 (H) 05/02/2024    CREATININE 1.19 11/20/2023    CREATININE 1.54 (H) 05/18/2023    CREATININE 1.51 (H) 05/16/2023    CREATININE 1.02 05/09/2023    CREATININE 0.96 12/12/2019    CREATININE 1.01 12/11/2019    CREATININE 1.35 05/09/2018    CREATININE 1.18 05/08/2018    CREATININE 1.20 03/26/2018      Lab Results   Component Value Date    COLORU Light Yellow 05/02/2024    CLARITYU Clear 05/02/2024    SPECGRAV 1.014 05/02/2024    PHUR 7.0 05/02/2024    LEUKOCYTESUR Negative 05/02/2024    NITRITE Negative 05/02/2024    PROTEIN UA 30 (1+) (A) 05/02/2024    GLUCOSEU Negative 05/02/2024    KETONESU Negative 05/02/2024    UROBILINOGEN 2.0 (A) 05/02/2024    BILIRUBINUR Negative 05/02/2024    BLOODU Negative 05/02/2024    RBCUA None Seen 05/02/2024    WBCUA None Seen 05/02/2024    EPIS None Seen 05/02/2024    BACTERIA None Seen 05/02/2024      No results found for: \"LABPROT\"  No results found for: \"MICROALBUR\", \"ONOG96NVQ\"  Lab Results   Component Value Date    WBC 5.83 05/02/2024    HGB 11.8 (L) 05/02/2024    HCT 36.7 05/02/2024    MCV 86 05/02/2024     05/02/2024      Lab Results   Component Value Date    HGB 11.8 (L) 05/02/2024    HGB 13.2 11/20/2023    HGB 13.7 05/09/2023      No results found for: \"IRON\", \"TIBC\", \"FERRITIN\"   No results found for: \"PTHCALCIUM\", \"XYPR82GBUSTS\", \"PHOSPHORUS\"   Lab Results   Component Value Date    CHOLESTEROL 161 05/02/2024    HDL 31 (L) 05/02/2024    LDLCALC 105 (H) 05/02/2024    TRIG 123 05/02/2024      Lab Results   Component Value Date    URICACID 5.1 05/02/2024    " "  Lab Results   Component Value Date    HGBA1C 6.9 (H) 05/02/2024      No results found for: \"TSHANTIBODY\", \"L8FSQXP\", \"FREET4\"   No results found for: \"TAMMIE\", \"DSDNAAB\", \"RFIGM\"   Lab Results   Component Value Date    UPEP See Comment 05/02/2024        Portions of the record may have been created with voice recognition software. Occasional wrong word or \"sound a like\" substitutions may have occurred due to the inherent limitations of voice recognition software. Read the chart carefully and recognize, using context, where substitutions have occurred. If you have any questions, please contact the dictating provider.      "

## 2024-05-15 ENCOUNTER — TELEPHONE (OUTPATIENT)
Age: 64
End: 2024-05-15

## 2024-05-15 NOTE — TELEPHONE ENCOUNTER
Patient's wife called states that when patient was at office visit provider discussed possibly starting patient on Jardiance but was going to speak with PCP first about possibly adjusting the metformin.  Wife asking for any updates on this?  Please advise.

## 2024-05-15 NOTE — TELEPHONE ENCOUNTER
I called and left a VM informing Rinku and his wife that Dr Edward is out of the office this week and will be back on Monday. She can address any concerns regarding medication changes at that time.

## 2024-05-15 NOTE — TELEPHONE ENCOUNTER
Wife returning call and made aware:   DASHA Amaya to Nora Silvernd     5/15/24  2:23 PMHigh Priority Please advise patient Dr. Edward is out of the office for the rest of this week. We will discuss with her on Monday. Thank you    Wife verbalized understanding.

## 2024-05-24 DIAGNOSIS — R80.9 TYPE 2 DIABETES MELLITUS WITH MICROALBUMINURIA, WITHOUT LONG-TERM CURRENT USE OF INSULIN (HCC): ICD-10-CM

## 2024-05-24 DIAGNOSIS — E11.29 TYPE 2 DIABETES MELLITUS WITH MICROALBUMINURIA, WITHOUT LONG-TERM CURRENT USE OF INSULIN (HCC): ICD-10-CM

## 2024-05-30 DIAGNOSIS — I10 ESSENTIAL HYPERTENSION: ICD-10-CM

## 2024-05-30 DIAGNOSIS — N40.0 BPH (BENIGN PROSTATIC HYPERPLASIA): ICD-10-CM

## 2024-05-30 RX ORDER — TAMSULOSIN HYDROCHLORIDE 0.4 MG/1
0.4 CAPSULE ORAL
Qty: 90 CAPSULE | Refills: 3 | Status: SHIPPED | OUTPATIENT
Start: 2024-05-30

## 2024-06-06 ENCOUNTER — HOSPITAL ENCOUNTER (OUTPATIENT)
Dept: CT IMAGING | Facility: HOSPITAL | Age: 64
End: 2024-06-06
Payer: MEDICARE

## 2024-06-06 DIAGNOSIS — R91.1 LUNG NODULE: ICD-10-CM

## 2024-06-06 PROCEDURE — 71250 CT THORAX DX C-: CPT

## 2024-06-06 PROCEDURE — G1004 CDSM NDSC: HCPCS

## 2024-06-11 ENCOUNTER — APPOINTMENT (OUTPATIENT)
Age: 64
End: 2024-06-11
Payer: MEDICARE

## 2024-06-11 DIAGNOSIS — I10 ESSENTIAL HYPERTENSION: ICD-10-CM

## 2024-06-11 LAB
ANION GAP SERPL CALCULATED.3IONS-SCNC: 8 MMOL/L (ref 4–13)
BUN SERPL-MCNC: 17 MG/DL (ref 5–25)
CALCIUM SERPL-MCNC: 9.2 MG/DL (ref 8.4–10.2)
CHLORIDE SERPL-SCNC: 102 MMOL/L (ref 96–108)
CO2 SERPL-SCNC: 24 MMOL/L (ref 21–32)
CREAT SERPL-MCNC: 1.39 MG/DL (ref 0.6–1.3)
GFR SERPL CREATININE-BSD FRML MDRD: 52 ML/MIN/1.73SQ M
GLUCOSE P FAST SERPL-MCNC: 98 MG/DL (ref 65–99)
POTASSIUM SERPL-SCNC: 4.3 MMOL/L (ref 3.5–5.3)
SODIUM SERPL-SCNC: 134 MMOL/L (ref 135–147)

## 2024-06-11 PROCEDURE — 36415 COLL VENOUS BLD VENIPUNCTURE: CPT

## 2024-06-11 PROCEDURE — 80048 BASIC METABOLIC PNL TOTAL CA: CPT

## 2024-06-12 ENCOUNTER — TELEPHONE (OUTPATIENT)
Dept: NEPHROLOGY | Facility: CLINIC | Age: 64
End: 2024-06-12

## 2024-06-12 NOTE — TELEPHONE ENCOUNTER
Patient returning call. Made aware:     Message from DASHA Marlow sent at 6/12/2024  8:34 AM EDT -----  Kidney function unchanged from 1 month ago. Serum sodium remains stable, 134. No changes indicated    Patient verbalized understanding.

## 2024-06-12 NOTE — TELEPHONE ENCOUNTER
----- Message from DASHA Marlow sent at 6/12/2024  8:34 AM EDT -----  Kidney function unchanged from 1 month ago. Serum sodium remains stable, 134. No changes indicated

## 2024-06-16 ENCOUNTER — PATIENT MESSAGE (OUTPATIENT)
Dept: FAMILY MEDICINE CLINIC | Facility: CLINIC | Age: 64
End: 2024-06-16

## 2024-06-16 DIAGNOSIS — M54.40 ACUTE LOW BACK PAIN WITH SCIATICA, SCIATICA LATERALITY UNSPECIFIED, UNSPECIFIED BACK PAIN LATERALITY: Primary | ICD-10-CM

## 2024-06-18 ENCOUNTER — OFFICE VISIT (OUTPATIENT)
Dept: HEMATOLOGY ONCOLOGY | Facility: CLINIC | Age: 64
End: 2024-06-18
Payer: MEDICARE

## 2024-06-18 VITALS
SYSTOLIC BLOOD PRESSURE: 122 MMHG | RESPIRATION RATE: 18 BRPM | BODY MASS INDEX: 32.64 KG/M2 | TEMPERATURE: 98.1 F | HEART RATE: 65 BPM | OXYGEN SATURATION: 98 % | DIASTOLIC BLOOD PRESSURE: 70 MMHG | WEIGHT: 241 LBS | HEIGHT: 72 IN

## 2024-06-18 DIAGNOSIS — D47.2 MGUS (MONOCLONAL GAMMOPATHY OF UNKNOWN SIGNIFICANCE): Primary | ICD-10-CM

## 2024-06-18 DIAGNOSIS — R91.1 LUNG NODULE: ICD-10-CM

## 2024-06-18 PROCEDURE — 99214 OFFICE O/P EST MOD 30 MIN: CPT | Performed by: INTERNAL MEDICINE

## 2024-06-18 PROCEDURE — G2211 COMPLEX E/M VISIT ADD ON: HCPCS | Performed by: INTERNAL MEDICINE

## 2024-06-18 RX ORDER — LIDOCAINE 50 MG/G
1 PATCH TOPICAL DAILY
Qty: 30 PATCH | Refills: 1 | Status: SHIPPED | OUTPATIENT
Start: 2024-06-18

## 2024-06-18 RX ORDER — METHOCARBAMOL 500 MG/1
500 TABLET, FILM COATED ORAL 4 TIMES DAILY
Qty: 60 TABLET | Refills: 1 | Status: SHIPPED | OUTPATIENT
Start: 2024-06-18

## 2024-06-18 NOTE — PROGRESS NOTES
Hematology/Oncology Outpatient Follow-up  Robert Benson 64 y.o. male 1960 [id number]    Date:  6/18/2024        Assessment and Plan:  1. MGUS (monoclonal gammopathy of unknown significance)  He seems to have IgM kappa monoclonal gammopathy with low concentration.  We did discuss the usual indication for bone marrow biopsy which does not seem to be necessary at this point.  There is mild decline of the kidney function and mild drop of the hemoglobin level which will need to be monitored closely.  Will recheck his gammopathy markers prior to his next visit in 6 months and then act accordingly.  - CBC and differential; Future  - Comprehensive metabolic panel; Future  - Magnesium; Future  - IgG, IgA, IgM; Future  - Immunoglobulin free LT chains blood; Future  - Beta 2 microglobulin, serum; Future    2. Lung nodule  Had a CT scan of the chest without contrast on 6/6/2024 which showed stable 6 mm intrafissural node along the right minor fissure.  There is also stable diffuse sub-5 mm groundglass nodules likely representing smoking-related interstitial lung disease.  Annual lung screening with low-dose CT scan is recommended which will be ordered at the next visit for June of next year.        HPI:  Patient is a pleasant 63-year-old male who originally presented for further evaluation of his abnormal SPEP/serum free light chains: referred by nephrology.  He has past medical history of hypertension, diabetes, achalasia, GERD and chronic hyponatremia.  He does admit to smoking cigars.  Has decent family history of malignancies and a niece who harbors BRCA mutation; he did have genetic testing in the past which came back negative.     He states that around 5/2023 he presented to the hospital with reports of significant back pain.  The back pain resolved however since that time he has been referred to multiple specialists including urology, surgery nephrology due to incidental findings.  Established care with a new PCP  recently as well.  Was found to have the abnormal SPEP/free light chains with nephrology work-up.     Labs 10/6/2023 showed sodium 133, creatinine 1.22, GFR 66, calcium normal 9.8 remaining metabolic panel is normal.  Urinalysis showed +1 protein.  His serum protein electrophoresis showed a faint band in the gamma region suspicious for monoclonal protein however immunofixation was not performed.  His urine protein electrophoresis was negative for monoclonal protein.  TSH normal 0.53.  Serum free light chain showed elevated kappa 52.7 mg/L, normal lambda 22.4 mg/L with slightly elevated kappa to lambda ratio 2.35.  Most recent CBC available for review 5/9/2023 showed normal white cells and platelets, he was not anemic H&H 13.7/41.2, MCV 86.      Interval history:  Patient came there for follow-up visit accompanied by his wife.  He denied significant changes of his overall condition.  Blood work on 5-24 showed hemoglobin of 11.8 with normal white cells and platelets.  Creatinine 1.3 with normal calcium and liver enzymes.  IgM level was 637 mg/dL.  The serum light chain consideration for kappa was 62 mg/L with kappa to lambda ratio of 2.5.  SPEP continues to be abnormal.  The prior serum immunofixation showed IgM kappa gammopathy.  UPEP was negative for M protein.            ROS: Review of Systems   Constitutional:  Negative for chills and fever.   HENT:  Positive for hearing loss. Negative for ear pain and sore throat.    Eyes:  Negative for pain and visual disturbance.   Respiratory:  Positive for cough and shortness of breath.    Cardiovascular:  Negative for chest pain and palpitations.   Gastrointestinal:  Negative for abdominal pain and vomiting.   Genitourinary:  Negative for dysuria and hematuria.   Musculoskeletal:  Negative for arthralgias and back pain.   Skin:  Negative for color change and rash.   Neurological:  Positive for numbness. Negative for seizures and syncope.   All other systems reviewed and are  negative.      Past Medical History:   Diagnosis Date    Bilateral flank pain 05/11/2023    CAD (coronary artery disease)     nonobstructive RCA via CT 2013    Diabetes mellitus (HCC)     Heart murmur     Hyperlipidemia     Hypertension     PAF (paroxysmal atrial fibrillation) (HCC)        Past Surgical History:   Procedure Laterality Date    APPENDECTOMY N/A     JOINT REPLACEMENT Right     MYOTOMY HELLER LAPAROSCOPIC  2018    [place]    STOMACH SURGERY         Social History     Socioeconomic History    Marital status: /Civil Union     Spouse name: None    Number of children: None    Years of education: None    Highest education level: None   Occupational History    None   Tobacco Use    Smoking status: Every Day     Types: Cigars    Smokeless tobacco: Never   Vaping Use    Vaping status: Never Used   Substance and Sexual Activity    Alcohol use: Yes     Comment: rarely    Drug use: Never    Sexual activity: Not Currently   Other Topics Concern    None   Social History Narrative    None     Social Determinants of Health     Financial Resource Strain: Not on file   Food Insecurity: Not on file   Transportation Needs: Not on file   Physical Activity: Not on file   Stress: Not on file   Social Connections: Not on file   Intimate Partner Violence: Not on file   Housing Stability: Not on file       Family History   Problem Relation Age of Onset    Cancer Mother         stomach    Heart attack Father     Cancer Sister         bladder    Cancer Brother         colon       No Known Allergies      Current Outpatient Medications:     aspirin 81 mg chewable tablet, Chew 81 mg daily, Disp: , Rfl:     atorvastatin (LIPITOR) 40 mg tablet, Take 1 tablet (40 mg total) by mouth every evening, Disp: 90 tablet, Rfl: 5    Empagliflozin (Jardiance) 10 MG TABS tablet, Take 1 tablet (10 mg total) by mouth daily, Disp: 30 tablet, Rfl: 2    esomeprazole (NexIUM) 40 MG capsule, Take 1 capsule (40 mg total) by mouth in the morning,  Disp: 90 capsule, Rfl: 3    fenofibrate (TRICOR) 145 mg tablet, Take 145 mg by mouth daily, Disp: , Rfl:     lidocaine (Lidoderm) 5 %, Apply 1 patch topically over 12 hours daily Remove & Discard patch within 12 hours or as directed by MD, Disp: 30 patch, Rfl: 1    lisinopril (ZESTRIL) 5 mg tablet, Take 1 tablet (5 mg total) by mouth daily, Disp: 90 tablet, Rfl: 5    metFORMIN (GLUCOPHAGE) 500 mg tablet, Take 1 tablet (500 mg total) by mouth daily with breakfast, Disp: 90 tablet, Rfl: 3    methocarbamol (ROBAXIN) 500 mg tablet, Take 1 tablet (500 mg total) by mouth 4 (four) times a day, Disp: 60 tablet, Rfl: 1    sodium chloride 1 g tablet, Take 1 tablet (1 g total) by mouth every other day, Disp: 45 tablet, Rfl: 1    Sodium Fluoride 5000 PPM 1.1 % PSTE, APPLY TO AFFECTED AREA NIGHTLY(NOT COVERED BY INSURANCE), Disp: , Rfl:     tamsulosin (FLOMAX) 0.4 mg, Take 1 capsule (0.4 mg total) by mouth daily with dinner, Disp: 90 capsule, Rfl: 3      Physical Exam:  /70 (BP Location: Right arm, Patient Position: Sitting, Cuff Size: Adult)   Pulse 65   Temp 98.1 °F (36.7 °C)   Resp 18   Ht 6' (1.829 m)   Wt 109 kg (241 lb)   SpO2 98%   BMI 32.69 kg/m²     Physical Exam  Constitutional:       Appearance: He is well-developed.   HENT:      Head: Normocephalic and atraumatic.      Nose: Nose normal.   Eyes:      General: No scleral icterus.        Right eye: No discharge.         Left eye: No discharge.      Conjunctiva/sclera: Conjunctivae normal.      Pupils: Pupils are equal, round, and reactive to light.   Neck:      Thyroid: No thyromegaly.      Trachea: No tracheal deviation.   Cardiovascular:      Rate and Rhythm: Normal rate and regular rhythm.      Heart sounds: Normal heart sounds. No murmur heard.     No friction rub.   Pulmonary:      Effort: Pulmonary effort is normal. No respiratory distress.      Breath sounds: Normal breath sounds. No wheezing or rales.   Chest:      Chest wall: No tenderness.    Abdominal:      General: There is no distension.      Palpations: Abdomen is soft. There is no hepatomegaly or splenomegaly.      Tenderness: There is no abdominal tenderness. There is no guarding or rebound.   Musculoskeletal:         General: No tenderness or deformity. Normal range of motion.      Cervical back: Normal range of motion and neck supple.   Lymphadenopathy:      Cervical: No cervical adenopathy.   Skin:     General: Skin is warm and dry.      Coloration: Skin is not pale.      Findings: No erythema or rash.   Neurological:      Mental Status: He is alert and oriented to person, place, and time.      Cranial Nerves: No cranial nerve deficit.      Coordination: Coordination normal.      Deep Tendon Reflexes: Reflexes are normal and symmetric.   Psychiatric:         Behavior: Behavior normal.         Thought Content: Thought content normal.         Judgment: Judgment normal.           Labs:  Lab Results   Component Value Date    WBC 5.83 05/02/2024    HGB 11.8 (L) 05/02/2024    HCT 36.7 05/02/2024    MCV 86 05/02/2024     05/02/2024     Lab Results   Component Value Date    K 4.3 06/11/2024     06/11/2024    CO2 24 06/11/2024    BUN 17 06/11/2024    CREATININE 1.39 (H) 06/11/2024    GLUF 98 06/11/2024    CALCIUM 9.2 06/11/2024    AST 23 05/02/2024    ALT 29 05/02/2024    ALKPHOS 32 (L) 05/02/2024    EGFR 52 06/11/2024     Lab Results   Component Value Date    TSH 1.11 12/12/2019       Patient voiced understanding and agreement in the above discussion. Aware to contact our office with questions/symptoms in the interim.

## 2024-07-16 ENCOUNTER — ANESTHESIA (OUTPATIENT)
Dept: GASTROENTEROLOGY | Facility: HOSPITAL | Age: 64
End: 2024-07-16

## 2024-07-16 ENCOUNTER — HOSPITAL ENCOUNTER (OUTPATIENT)
Dept: GASTROENTEROLOGY | Facility: HOSPITAL | Age: 64
Setting detail: OUTPATIENT SURGERY
Discharge: HOME | End: 2024-07-16
Attending: STUDENT IN AN ORGANIZED HEALTH CARE EDUCATION/TRAINING PROGRAM
Payer: MEDICARE

## 2024-07-16 ENCOUNTER — ANESTHESIA EVENT (OUTPATIENT)
Dept: GASTROENTEROLOGY | Facility: HOSPITAL | Age: 64
End: 2024-07-16

## 2024-07-16 VITALS
TEMPERATURE: 98.5 F | HEART RATE: 56 BPM | OXYGEN SATURATION: 98 % | BODY MASS INDEX: 32.23 KG/M2 | SYSTOLIC BLOOD PRESSURE: 116 MMHG | WEIGHT: 238 LBS | RESPIRATION RATE: 16 BRPM | DIASTOLIC BLOOD PRESSURE: 66 MMHG | HEIGHT: 72 IN

## 2024-07-16 DIAGNOSIS — Z86.010 PERSONAL HISTORY OF COLONIC POLYPS: ICD-10-CM

## 2024-07-16 LAB — GLUCOSE SERPL-MCNC: 90 MG/DL (ref 65–140)

## 2024-07-16 PROCEDURE — 45380 COLONOSCOPY AND BIOPSY: CPT | Performed by: STUDENT IN AN ORGANIZED HEALTH CARE EDUCATION/TRAINING PROGRAM

## 2024-07-16 PROCEDURE — 45385 COLONOSCOPY W/LESION REMOVAL: CPT | Performed by: STUDENT IN AN ORGANIZED HEALTH CARE EDUCATION/TRAINING PROGRAM

## 2024-07-16 PROCEDURE — 82948 REAGENT STRIP/BLOOD GLUCOSE: CPT

## 2024-07-16 PROCEDURE — 88305 TISSUE EXAM BY PATHOLOGIST: CPT | Performed by: PATHOLOGY

## 2024-07-16 RX ORDER — SODIUM CHLORIDE, SODIUM LACTATE, POTASSIUM CHLORIDE, CALCIUM CHLORIDE 600; 310; 30; 20 MG/100ML; MG/100ML; MG/100ML; MG/100ML
INJECTION, SOLUTION INTRAVENOUS CONTINUOUS PRN
Status: DISCONTINUED | OUTPATIENT
Start: 2024-07-16 | End: 2024-07-16

## 2024-07-16 RX ORDER — PROPOFOL 10 MG/ML
INJECTION, EMULSION INTRAVENOUS AS NEEDED
Status: DISCONTINUED | OUTPATIENT
Start: 2024-07-16 | End: 2024-07-16

## 2024-07-16 RX ORDER — LIDOCAINE HYDROCHLORIDE 20 MG/ML
INJECTION, SOLUTION EPIDURAL; INFILTRATION; INTRACAUDAL; PERINEURAL AS NEEDED
Status: DISCONTINUED | OUTPATIENT
Start: 2024-07-16 | End: 2024-07-16

## 2024-07-16 RX ADMIN — PROPOFOL 100 MG: 10 INJECTION, EMULSION INTRAVENOUS at 07:38

## 2024-07-16 RX ADMIN — SODIUM CHLORIDE, SODIUM LACTATE, POTASSIUM CHLORIDE, AND CALCIUM CHLORIDE: .6; .31; .03; .02 INJECTION, SOLUTION INTRAVENOUS at 07:31

## 2024-07-16 RX ADMIN — PROPOFOL 140 MCG/KG/MIN: 10 INJECTION, EMULSION INTRAVENOUS at 07:40

## 2024-07-16 RX ADMIN — LIDOCAINE HYDROCHLORIDE 100 MG: 20 INJECTION, SOLUTION EPIDURAL; INFILTRATION; INTRACAUDAL; PERINEURAL at 07:38

## 2024-07-16 NOTE — ANESTHESIA PREPROCEDURE EVALUATION
Procedure:  COLONOSCOPY    Relevant Problems   CARDIO   (+) Essential hypertension   (+) Paroxysmal A-fib (HCC)      ENDO   (+) Diabetes mellitus, type II (HCC)      /RENAL   (+) Chronic kidney disease (CKD), stage II (mild)        Physical Exam    Airway    Mallampati score: IV  TM Distance: >3 FB  Neck ROM: full     Dental        Cardiovascular  Cardiovascular exam normal    Pulmonary  Pulmonary exam normal     Other Findings        Anesthesia Plan  ASA Score- 3     Anesthesia Type- IV sedation with anesthesia with ASA Monitors.         Additional Monitors:     Airway Plan:            Plan Factors-Exercise tolerance (METS): >4 METS.    Chart reviewed. EKG reviewed.  Existing labs reviewed. Patient summary reviewed.    Patient is not a current smoker.  Patient did not smoke on day of surgery.    Obstructive sleep apnea risk education given perioperatively.        Induction- intravenous.    Postoperative Plan-     Perioperative Resuscitation Plan - Level 1 - Full Code.       Informed Consent- Anesthetic plan and risks discussed with patient and spouse.  I personally reviewed this patient with the CRNA. Discussed and agreed on the Anesthesia Plan with the CRNA..

## 2024-07-16 NOTE — ANESTHESIA POSTPROCEDURE EVALUATION
Post-Op Assessment Note    CV Status:  Stable  Pain Score: 0    Pain management: adequate       Mental Status:  Sleepy   Hydration Status:  Euvolemic   PONV Controlled:  Controlled   Airway Patency:  Patent     Post Op Vitals Reviewed: Yes    No anethesia notable event occurred.    Staff: ÓSCAR           BP   98/67   Temp      Pulse  62   Resp   12   SpO2   98

## 2024-07-16 NOTE — H&P
History and Physical - [facility]  Robert Benson 64 y.o. male MRN: [id number]                  HPI: Robert Benson is a 64 y.o. year old male who presents for history of colon polyps, family history of colon cancer in brother at age 53.      REVIEW OF SYSTEMS: Per the HPI, and otherwise unremarkable.    Historical Information   Past Medical History:   Diagnosis Date    Bilateral flank pain 05/11/2023    CAD (coronary artery disease)     nonobstructive RCA via CT 2013    Colon polyp     COPD (chronic obstructive pulmonary disease) (HCC)     Diabetes mellitus (HCC)     GERD (gastroesophageal reflux disease)     Heart murmur     Hyperlipidemia     Hypertension     PAF (paroxysmal atrial fibrillation) (HCC)     PONV (postoperative nausea and vomiting)     Sleep apnea      Past Surgical History:   Procedure Laterality Date    APPENDECTOMY N/A     JOINT REPLACEMENT Right     MYOTOMY HELLER LAPAROSCOPIC  2018    [place]    STOMACH SURGERY       Social History   Social History     Substance and Sexual Activity   Alcohol Use Yes    Comment: rarely     Social History     Substance and Sexual Activity   Drug Use Never     Social History     Tobacco Use   Smoking Status Every Day    Types: Cigars   Smokeless Tobacco Never     Family History   Problem Relation Age of Onset    Cancer Mother         stomach    Heart attack Father     Cancer Sister         bladder    Cancer Brother         colon       Meds/Allergies       Current Outpatient Medications:     aspirin 81 mg chewable tablet    atorvastatin (LIPITOR) 40 mg tablet    esomeprazole (NexIUM) 40 MG capsule    fenofibrate (TRICOR) 145 mg tablet    lisinopril (ZESTRIL) 5 mg tablet    Sodium Fluoride 5000 PPM 1.1 % PSTE    Empagliflozin (Jardiance) 10 MG TABS tablet    lidocaine (Lidoderm) 5 %    metFORMIN (GLUCOPHAGE) 500 mg tablet    methocarbamol (ROBAXIN) 500 mg tablet    sodium chloride 1 g tablet    tamsulosin (FLOMAX) 0.4 mg    No Known  Allergies    Objective     /75   Pulse 61   Temp 97.7 °F (36.5 °C) (Tympanic)   Resp 16   Ht 6' (1.829 m)   Wt 108 kg (238 lb)   SpO2 97%   BMI 32.28 kg/m²       PHYSICAL EXAM    Gen: NAD  Head: NCAT  CV: RRR  CHEST: Clear  ABD: soft, NT/ND  EXT: no edema      ASSESSMENT/PLAN:  This is a 64 y.o. year old male here for colonoscopy, and he is stable and optimized for his procedure.

## 2024-07-17 DIAGNOSIS — E78.5 DYSLIPIDEMIA: Primary | ICD-10-CM

## 2024-07-17 NOTE — TELEPHONE ENCOUNTER
Refill must be reviewed and completed by the office or provider. The refill is unable to be approved or denied by the medication management team.      Last ordered by another provider. Historic Provider (Urgent Care Encounter)- Please review to see if the refill is appropriate.   9/28/2023  [facility] Rinku Ramsey

## 2024-07-17 NOTE — TELEPHONE ENCOUNTER
Reason for call:   [x] Refill   [] Prior Auth  [] Other:     Office:   [] PCP/Provider -   [x] Specialty/Provider - Cardiology    Medication:     Does the patient have enough for 3 days?   [] Yes   [x] No - Send as HP to POD

## 2024-07-18 RX ORDER — FENOFIBRATE 145 MG/1
145 TABLET, COATED ORAL DAILY
Qty: 100 TABLET | Refills: 1 | Status: SHIPPED | OUTPATIENT
Start: 2024-07-18

## 2024-07-19 PROCEDURE — 88305 TISSUE EXAM BY PATHOLOGIST: CPT | Performed by: PATHOLOGY

## 2024-07-23 ENCOUNTER — PREP FOR PROCEDURE (OUTPATIENT)
Dept: GASTROENTEROLOGY | Facility: MEDICAL CENTER | Age: 64
End: 2024-07-23

## 2024-07-23 ENCOUNTER — OFFICE VISIT (OUTPATIENT)
Dept: FAMILY MEDICINE CLINIC | Facility: CLINIC | Age: 64
End: 2024-07-23
Payer: MEDICARE

## 2024-07-23 ENCOUNTER — TELEPHONE (OUTPATIENT)
Dept: GASTROENTEROLOGY | Facility: MEDICAL CENTER | Age: 64
End: 2024-07-23

## 2024-07-23 VITALS
DIASTOLIC BLOOD PRESSURE: 65 MMHG | WEIGHT: 242.3 LBS | OXYGEN SATURATION: 98 % | BODY MASS INDEX: 32.82 KG/M2 | SYSTOLIC BLOOD PRESSURE: 133 MMHG | HEIGHT: 72 IN | HEART RATE: 64 BPM

## 2024-07-23 DIAGNOSIS — I10 ESSENTIAL HYPERTENSION: ICD-10-CM

## 2024-07-23 DIAGNOSIS — Z01.10 ENCOUNTER FOR HEARING EXAMINATION WITHOUT ABNORMAL FINDINGS: ICD-10-CM

## 2024-07-23 DIAGNOSIS — K63.5 CECAL POLYP: Primary | ICD-10-CM

## 2024-07-23 DIAGNOSIS — Z72.0 TOBACCO USE: ICD-10-CM

## 2024-07-23 DIAGNOSIS — J42 CHRONIC BRONCHITIS, UNSPECIFIED CHRONIC BRONCHITIS TYPE (HCC): ICD-10-CM

## 2024-07-23 DIAGNOSIS — N18.31 STAGE 3A CHRONIC KIDNEY DISEASE (HCC): ICD-10-CM

## 2024-07-23 DIAGNOSIS — Z82.49 FAMILY HISTORY OF CHRONIC ISCHEMIC HEART DISEASE: ICD-10-CM

## 2024-07-23 DIAGNOSIS — M54.40 ACUTE LOW BACK PAIN WITH SCIATICA, SCIATICA LATERALITY UNSPECIFIED, UNSPECIFIED BACK PAIN LATERALITY: ICD-10-CM

## 2024-07-23 DIAGNOSIS — E08.40 DIABETES MELLITUS DUE TO UNDERLYING CONDITION WITH DIABETIC NEUROPATHY, WITHOUT LONG-TERM CURRENT USE OF INSULIN (HCC): Primary | ICD-10-CM

## 2024-07-23 DIAGNOSIS — Z11.59 NEED FOR HEPATITIS C SCREENING TEST: ICD-10-CM

## 2024-07-23 DIAGNOSIS — E87.1 HYPONATREMIA: ICD-10-CM

## 2024-07-23 LAB
CREAT UR-MCNC: 51.1 MG/DL
MICROALBUMIN UR-MCNC: 45.4 MG/L
MICROALBUMIN/CREAT 24H UR: 89 MG/G CREATININE (ref 0–30)

## 2024-07-23 PROCEDURE — 82570 ASSAY OF URINE CREATININE: CPT | Performed by: INTERNAL MEDICINE

## 2024-07-23 PROCEDURE — G0402 INITIAL PREVENTIVE EXAM: HCPCS | Performed by: INTERNAL MEDICINE

## 2024-07-23 PROCEDURE — 99173 VISUAL ACUITY SCREEN: CPT | Performed by: INTERNAL MEDICINE

## 2024-07-23 PROCEDURE — 99214 OFFICE O/P EST MOD 30 MIN: CPT | Performed by: INTERNAL MEDICINE

## 2024-07-23 PROCEDURE — 82043 UR ALBUMIN QUANTITATIVE: CPT | Performed by: INTERNAL MEDICINE

## 2024-07-23 PROCEDURE — G0403 EKG FOR INITIAL PREVENT EXAM: HCPCS | Performed by: INTERNAL MEDICINE

## 2024-07-23 RX ORDER — AMOXICILLIN 500 MG/1
2000 CAPSULE ORAL AS NEEDED
COMMUNITY
Start: 2024-07-08

## 2024-07-23 RX ORDER — POLYETHYLENE GLYCOL 3350 17 G
2 POWDER IN PACKET (EA) ORAL EVERY 6 HOURS PRN
Qty: 100 EACH | Refills: 1 | Status: SHIPPED | OUTPATIENT
Start: 2024-07-23

## 2024-07-23 RX ORDER — METHOCARBAMOL 500 MG/1
500 TABLET, FILM COATED ORAL 4 TIMES DAILY
Qty: 60 TABLET | Refills: 1 | Status: SHIPPED | OUTPATIENT
Start: 2024-07-23

## 2024-07-23 NOTE — ASSESSMENT & PLAN NOTE
Lab Results   Component Value Date    EGFR 52 06/11/2024    EGFR 54 05/02/2024    EGFR 64 11/20/2023    CREATININE 1.39 (H) 06/11/2024    CREATININE 1.35 (H) 05/02/2024    CREATININE 1.19 11/20/2023   His renal function declined a little bit after starting empagliflozin.  Continue to track basic metabolic panel and continue nephrology follow-up.

## 2024-07-23 NOTE — PROGRESS NOTES
Ambulatory Visit  Name: Robert Benson      : 1960      MRN: [id number]  Encounter Provider: Srinivas Esquivel MD  Encounter Date: 2024   Encounter department: [facility]    Assessment & Plan  Diabetes mellitus due to underlying condition with diabetic neuropathy, without long-term current use of insulin (HCC)    Lab Results   Component Value Date    HGBA1C 6.9 (H) 2024   Good glycemic control on metformin and empagliflozin.  Stage 3a chronic kidney disease (HCC)  Lab Results   Component Value Date    EGFR 52 2024    EGFR 54 2024    EGFR 64 2023    CREATININE 1.39 (H) 2024    CREATININE 1.35 (H) 2024    CREATININE 1.19 2023   His renal function declined a little bit after starting empagliflozin.  Continue to track basic metabolic panel and continue nephrology follow-up.  Tobacco use  He hasn't smoked a cigar for a couple of weeks.  I renewed the prescription for  Acute low back pain with sciatica, sciatica laterality unspecified, unspecified back pain laterality  He is better after the lidocaine patches and methocarbamol.  Continue as needed use of these medications.  Essential hypertension  Good blood pressure control on current regimen.  Hyponatremia  His sodium improved after the addition of the salt tablets.  Continue follow-up with nephrology.  Chronic bronchitis, unspecified chronic bronchitis type (HCC)  No coughing or shortness of breath currently.  Expectant management.  Need for hepatitis C screening test    Family history of chronic ischemic heart disease  His EKG today has left axis deviation.  He did have a normal CT angio in 2013 and also had a PET stress test in  that was also normal.  Continue follow-up with cardiology.          Preventive health issues were discussed with patient, and age appropriate screening tests were ordered as noted in patient's After Visit Summary. Personalized health advice and appropriate  referrals for health education or preventive services given if needed, as noted in patient's After Visit Summary.    History of Present Illness        History of Present Illness  This is a 64-year-old male who is here today with his wife. He is accompanied by his wife.    The patient has successfully ceased smoking and is currently utilizing nicotine lozenges to aid in cessation. He reports no cardiac issues, including chest pain. An advanced colorectal examination revealed an abnormality, necessitating further investigation. An endoscopic mucocele resection procedure is planned to remove another polyp. The initial gastroenterologist was unable to remove the polyp due to the size exceeding 20 mm and flat. The patient admits to excessive cigar consumption and is making efforts to gradually reduce his consumption. He initially reduced his intake to 4 mg, reducing it to a weekly cigar, but has since reduced to a cigar, citing abstinence for the past few weeks. He experiences shortness of breath when ascending hilly terrain, but not on flat surfaces. He continues his follow-up with the nephrology team, with his last visit in 05/2024. His current medication regimen includes sodium chloride tablets every other day to elevate his sodium levels. He does not monitor his blood glucose levels at home. His recent random blood glucose level was 90. His current medication regimen includes Jardiance 10 mg daily and metformin 500 mg daily. He denies any concerns regarding his short-term memory.           Medical History Reviewed by provider this encounter:       Annual Wellness Visit Questionnaire       Health Risk Assessment:   Patient rates overall health as very good. Patient feels that their physical health rating is same. Patient is very satisfied with their life. Eyesight was rated as same. Hearing was rated as same. Patient feels that their emotional and mental health rating is same. Patients states they are never, rarely  angry. Patient states they are never, rarely unusually tired/fatigued. Pain experienced in the last 7 days has been none. Patient states that he has experienced weight loss or gain in last 6 months.     Depression Screening:   PHQ-2 Score: 0      Fall Risk Screening:   In the past year, patient has experienced: no history of falling in past year      Home Safety:  Patient does not have trouble with stairs inside or outside of their home. Patient has working smoke alarms and has working carbon monoxide detector. Home safety hazards include: none.     Nutrition:   Current diet is Diabetic.     Medications:   Patient is not currently taking any over-the-counter supplements. Patient is able to manage medications.     Activities of Daily Living (ADLs)/Instrumental Activities of Daily Living (IADLs):   Walk and transfer into and out of bed and chair?: Yes  Dress and groom yourself?: Yes    Bathe or shower yourself?: Yes    Feed yourself? Yes  Do your laundry/housekeeping?: Yes  Manage your money, pay your bills and track your expenses?: Yes  Make your own meals?: Yes    Do your own shopping?: Yes    Previous Hospitalizations:   Any hospitalizations or ED visits within the last 12 months?: No      Advance Care Planning:   Living will: No    Advanced directive counseling given: Yes    End of Life Decisions reviewed with patient: Yes    Provider agrees with end of life decisions: Yes      Comments: Robert chooses comfort care measures in the event of a terminal diagnosis.       Cognitive Screening:   Provider or family/friend/caregiver concerned regarding cognition?: No    PREVENTIVE SCREENINGS      Cardiovascular Screening:    General: Screening Current      Diabetes Screening:     General: Screening Not Indicated and History Diabetes      Colorectal Cancer Screening:     General: Screening Current      Osteoporosis Screening:    General: Screening Not Indicated      Abdominal Aortic Aneurysm (AAA) Screening:    Risk factors  include: age between 65-76 yo and tobacco use        Lung Cancer Screening:     General: Screening Not Indicated      Preventive Screening Comments: CT abdomen negative for aortic aneurysm 2023    Screening, Brief Intervention, and Referral to Treatment (SBIRT)    Screening  Typical number of drinks in a day: 0  Typical number of drinks in a week: 0  Interpretation: Low risk drinking behavior.    Single Item Drug Screening:  How often have you used an illegal drug (including marijuana) or a prescription medication for non-medical reasons in the past year? never    Single Item Drug Screen Score: 0  Interpretation: Negative screen for possible drug use disorder  Diabetic Foot Exam    Patient's shoes and socks removed.    Right Foot/Ankle   Right Foot Inspection  Skin Exam: skin normal and skin intact. No dry skin, no warmth, no callus, no erythema, no maceration, no abnormal color, no pre-ulcer, no ulcer and no callus.     Toe Exam: ROM and strength within normal limits.     Sensory   Monofilament testing: intact    Vascular  The right DP pulse is 2+. The right PT pulse is 2+.     Left Foot/Ankle  Left Foot Inspection  Skin Exam: skin normal and skin intact. No dry skin, no warmth, no erythema, no maceration, normal color, no pre-ulcer, no ulcer and no callus.     Toe Exam: ROM and strength within normal limits.     Sensory   Monofilament testing: intact    Vascular  The left DP pulse is 2+. The left PT pulse is 2+.     Assign Risk Category  No deformity present  No loss of protective sensation  No weak pulses  Risk: 0    Social Determinants of Health     Food Insecurity: No Food Insecurity (7/23/2024)    Hunger Vital Sign     Worried About Running Out of Food in the Last Year: Never true     Ran Out of Food in the Last Year: Never true   Transportation Needs: No Transportation Needs (7/23/2024)    PRAPARE - Transportation     Lack of Transportation (Medical): No     Lack of Transportation (Non-Medical): No   Housing  Stability: Low Risk  (7/23/2024)    Housing Stability Vital Sign     Unable to Pay for Housing in the Last Year: No     Number of Times Moved in the Last Year: 1     Homeless in the Last Year: No   Utilities: Not At Risk (7/23/2024)    [facility] Utilities     Threatened with loss of utilities: No     Vision Screening    Right eye Left eye Both eyes   Without correction      With correction 20/30 20/25 20/25       Objective     /65 (BP Location: Left arm, Patient Position: Sitting, Cuff Size: Large)   Pulse 64   Ht 6' (1.829 m)   Wt 110 kg (242 lb 4.8 oz)   SpO2 98%   BMI 32.86 kg/m²     Vision Screening    Right eye Left eye Both eyes   Without correction      With correction 20/30 20/25 20/25        Physical Exam  Constitutional:       General: He is not in acute distress.     Appearance: Normal appearance. He is well-developed. He is not ill-appearing.   Neck:      Vascular: No JVD.   Cardiovascular:      Rate and Rhythm: Normal rate and regular rhythm.      Pulses: no weak pulses.           Dorsalis pedis pulses are 2+ on the right side and 2+ on the left side.        Posterior tibial pulses are 2+ on the right side and 2+ on the left side.      Heart sounds: Normal heart sounds. No murmur heard.     No friction rub. No gallop.   Pulmonary:      Breath sounds: Normal breath sounds.   Abdominal:      General: Bowel sounds are normal. There is no distension.      Palpations: Abdomen is soft. There is no mass.   Musculoskeletal:         General: No swelling.   Feet:      Right foot:      Skin integrity: No ulcer, skin breakdown, erythema, warmth, callus or dry skin.      Left foot:      Skin integrity: No ulcer, skin breakdown, erythema, warmth, callus or dry skin.   Neurological:      Mental Status: He is alert.     EKG: Normal sinus rhythm at 60 bpm.  Right bundle branch block which was present on EKG 9 May 2023.  Left axis deviation which is new.  No ischemic changes.

## 2024-07-23 NOTE — TELEPHONE ENCOUNTER
Please call patient to schedule colonoscopy with EMR.  He has a large flat cecal polyp. Miralax/dulcolax  prep. Orders are in.

## 2024-07-23 NOTE — ASSESSMENT & PLAN NOTE
His EKG today has left axis deviation.  He did have a normal CT angio in August 2013 and also had a PET stress test in 2023 that was also normal.  Continue follow-up with cardiology.

## 2024-07-23 NOTE — ASSESSMENT & PLAN NOTE
Lab Results   Component Value Date    HGBA1C 6.9 (H) 05/02/2024   Good glycemic control on metformin and empagliflozin.

## 2024-07-24 ENCOUNTER — TELEPHONE (OUTPATIENT)
Dept: ADMINISTRATIVE | Facility: OTHER | Age: 64
End: 2024-07-24

## 2024-07-24 NOTE — TELEPHONE ENCOUNTER
Upon review of the In Basket request we were able to note that no further action is required. The patient chart is up to date as a result of a previous request.      Any additional questions or concerns should be emailed to the Practice Liaisons via the appropriate education email address, please do not reply via In Basket.    Thank you  Adela Torres MA   [facility]

## 2024-07-24 NOTE — TELEPHONE ENCOUNTER
----- Message from Rosanna MIMS sent at 7/24/2024  7:10 AM EDT -----  Regarding: [facility] request  07/24/24 7:10 AM    Hello, our patient attached above has had Urine Albumin/Creatinine Ratio completed/performed. Please assist in updating the patient chart by pulling the document from lab Tab within Chart Review. The date of service is 2024.     Thank you,  Rosanna Mujica  [facility]

## 2024-08-07 DIAGNOSIS — R80.9 TYPE 2 DIABETES MELLITUS WITH MICROALBUMINURIA, WITHOUT LONG-TERM CURRENT USE OF INSULIN (HCC): ICD-10-CM

## 2024-08-07 DIAGNOSIS — E11.29 TYPE 2 DIABETES MELLITUS WITH MICROALBUMINURIA, WITHOUT LONG-TERM CURRENT USE OF INSULIN (HCC): ICD-10-CM

## 2024-08-07 RX ORDER — EMPAGLIFLOZIN 10 MG/1
10 TABLET, FILM COATED ORAL DAILY
Qty: 90 TABLET | Refills: 1 | Status: SHIPPED | OUTPATIENT
Start: 2024-08-07

## 2024-08-12 ENCOUNTER — APPOINTMENT (OUTPATIENT)
Dept: RADIOLOGY | Facility: CLINIC | Age: 64
End: 2024-08-12
Payer: MEDICARE

## 2024-08-12 ENCOUNTER — OFFICE VISIT (OUTPATIENT)
Dept: URGENT CARE | Facility: CLINIC | Age: 64
End: 2024-08-12
Payer: MEDICARE

## 2024-08-12 VITALS
TEMPERATURE: 98 F | OXYGEN SATURATION: 96 % | HEART RATE: 86 BPM | SYSTOLIC BLOOD PRESSURE: 137 MMHG | DIASTOLIC BLOOD PRESSURE: 76 MMHG | RESPIRATION RATE: 18 BRPM

## 2024-08-12 DIAGNOSIS — M25.562 ACUTE PAIN OF LEFT KNEE: ICD-10-CM

## 2024-08-12 DIAGNOSIS — B35.4 TINEA CORPORIS: Primary | ICD-10-CM

## 2024-08-12 PROCEDURE — 99214 OFFICE O/P EST MOD 30 MIN: CPT | Performed by: NURSE PRACTITIONER

## 2024-08-12 PROCEDURE — 73562 X-RAY EXAM OF KNEE 3: CPT

## 2024-08-12 PROCEDURE — G0463 HOSPITAL OUTPT CLINIC VISIT: HCPCS | Performed by: NURSE PRACTITIONER

## 2024-08-12 RX ORDER — KETOCONAZOLE 20 MG/G
CREAM TOPICAL DAILY
Qty: 15 G | Refills: 0 | Status: SHIPPED | OUTPATIENT
Start: 2024-08-12 | End: 2024-08-26

## 2024-08-12 NOTE — PATIENT INSTRUCTIONS
You have been prescribed ketoconazole cream for possible ringworm. Use for 2 weeks - if not improved or worse - see your PCP.    Your xray was preliminarily read by your provider.  A radiologist will read the xray and you will be notified if it is abnormal.    Take tylenol or aleve as able for knee pain  You have been given a referral for ortho - call them for an appointment.     If tests have been performed at [facility], our office will contact you with results if changes need to be made to the care plan discussed with you at the visit.  You can review your full results on [facility] MyChart.  kn

## 2024-08-12 NOTE — PROGRESS NOTES
[facility]        NAME: Robert Benson is a 64 y.o. male  : 1960    MRN: [id number]  DATE: 2024  TIME: 1:03 PM    Assessment and Plan   Tinea corporis [B35.4]  1. Tinea corporis  ketoconazole (NIZORAL) 2 % cream      2. Acute pain of left knee  XR knee 3 vw left non injury    Ambulatory Referral to Orthopedic Surgery            Patient Instructions       Follow up with PCP in 3-5 days.  Proceed to  ER if symptoms worsen.    If tests have been performed at [facility], our office will contact you with results if changes need to be made to the care plan discussed with you at the visit.  You can review your full results on [facility].    You have been prescribed ketoconazole cream for possible ringworm. Use for 2 weeks - if not improved or worse - see your PCP.    Your xray was preliminarily read by your provider.  A radiologist will read the xray and you will be notified if it is abnormal.    Take tylenol or aleve as able for knee pain  You have been given a referral for ortho - call them for an appointment.     If tests have been performed at [facility], our office will contact you with results if changes need to be made to the care plan discussed with you at the visit.  You can review your full results on [facility].  kn      Chief Complaint     Chief Complaint   Patient presents with    Rash    Knee Pain     Left knee pain , right leg rash         History of Present Illness       This is a 64 year old male who states has a lesion on the right calf that is red and getting bigger however is not itchy, painful and has not done anything for it and isn't sure when it started.  He also c/o left knee pain.  He states that he had right knee replacement 12 years ago and the left knee is starting to feel like the right one did.  He took some tylenol w/o relief. He states that he was wearing a brace w/o relief.  He denies injury.  Has not seen his PCP for either issue.  States pain is  worse with walking and bearing weight.   PMH is listed.     Rash    Knee Pain         Review of Systems   Review of Systems   Constitutional: Negative.    HENT: Negative.     Eyes: Negative.    Respiratory: Negative.     Cardiovascular: Negative.    Gastrointestinal: Negative.    Endocrine: Negative.    Genitourinary: Negative.    Musculoskeletal:         Left knee pain     Skin:  Positive for rash.   Allergic/Immunologic: Negative.    Neurological: Negative.    Hematological: Negative.    Psychiatric/Behavioral:  Positive for decreased concentration, dysphoric mood and hallucinations.          Current Medications       Current Outpatient Medications:     aspirin 81 mg chewable tablet, Chew 81 mg daily, Disp: , Rfl:     atorvastatin (LIPITOR) 40 mg tablet, Take 1 tablet (40 mg total) by mouth every evening, Disp: 90 tablet, Rfl: 5    esomeprazole (NexIUM) 40 MG capsule, Take 1 capsule (40 mg total) by mouth in the morning, Disp: 90 capsule, Rfl: 3    fenofibrate (TRICOR) 145 mg tablet, Take 1 tablet (145 mg total) by mouth daily, Disp: 100 tablet, Rfl: 1    Jardiance 10 MG TABS tablet, TAKE 1 TABLET BY MOUTH EVERY DAY, Disp: 90 tablet, Rfl: 1    ketoconazole (NIZORAL) 2 % cream, Apply topically daily for 14 days, Disp: 15 g, Rfl: 0    lidocaine (Lidoderm) 5 %, Apply 1 patch topically over 12 hours daily Remove & Discard patch within 12 hours or as directed by MD (Patient taking differently: Apply 1 patch topically daily Remove & Discard patch within 12 hours or as directed by MD PRN), Disp: 30 patch, Rfl: 1    lisinopril (ZESTRIL) 5 mg tablet, Take 1 tablet (5 mg total) by mouth daily, Disp: 90 tablet, Rfl: 5    metFORMIN (GLUCOPHAGE) 500 mg tablet, Take 1 tablet (500 mg total) by mouth daily with breakfast, Disp: 90 tablet, Rfl: 3    methocarbamol (ROBAXIN) 500 mg tablet, Take 1 tablet (500 mg total) by mouth 4 (four) times a day prn, Disp: 60 tablet, Rfl: 1    nicotine polacrilex (COMMIT) 2 MG lozenge, Apply 1  lozenge (2 mg total) to the mouth or throat every 6 (six) hours as needed for smoking cessation, Disp: 100 each, Rfl: 1    sodium chloride 1 g tablet, Take 1 tablet (1 g total) by mouth every other day, Disp: 45 tablet, Rfl: 1    Sodium Fluoride 5000 PPM 1.1 % PSTE, APPLY TO AFFECTED AREA NIGHTLY(NOT COVERED BY INSURANCE), Disp: , Rfl:     tamsulosin (FLOMAX) 0.4 mg, Take 1 capsule (0.4 mg total) by mouth daily with dinner, Disp: 90 capsule, Rfl: 3    amoxicillin (AMOXIL) 500 mg capsule, Take 2,000 mg by mouth as needed 1 hour prior to dental appointment (Patient not taking: Reported on 8/12/2024), Disp: , Rfl:     Current Allergies     Allergies as of 08/12/2024    (No Known Allergies)            The following portions of the patient's history were reviewed and updated as appropriate: allergies, current medications, past family history, past medical history, past social history, past surgical history and problem list.     Past Medical History:   Diagnosis Date    Bilateral flank pain 05/11/2023    CAD (coronary artery disease)     nonobstructive RCA via CT 2013    Colon polyp     COPD (chronic obstructive pulmonary disease) (HCC)     Diabetes mellitus (HCC)     GERD (gastroesophageal reflux disease)     Heart murmur     Hyperlipidemia     Hypertension     PAF (paroxysmal atrial fibrillation) (HCC)     PONV (postoperative nausea and vomiting)     Sleep apnea        Past Surgical History:   Procedure Laterality Date    APPENDECTOMY N/A     JOINT REPLACEMENT Right     MYOTOMY HELLER LAPAROSCOPIC  2018    Buddhism    STOMACH SURGERY         Family History   Problem Relation Age of Onset    Cancer Mother         stomach    Heart attack Father     Cancer Sister         bladder    Cancer Brother         colon         Medications have been verified.        Objective   /76   Pulse 86   Temp 98 °F (36.7 °C)   Resp 18   SpO2 96%   No LMP for male patient.       Physical Exam     Physical Exam  Vitals and nursing note  reviewed.   Constitutional:       General: He is not in acute distress.     Appearance: Normal appearance. He is obese. He is not ill-appearing, toxic-appearing or diaphoretic.   HENT:      Head: Normocephalic and atraumatic.   Eyes:      Extraocular Movements: Extraocular movements intact.   Cardiovascular:      Rate and Rhythm: Normal rate.      Pulses: Normal pulses.   Pulmonary:      Effort: Pulmonary effort is normal.   Musculoskeletal:         General: No swelling, tenderness, deformity or signs of injury. Normal range of motion.      Cervical back: Normal range of motion.      Comments: Left knee:  FROM no laxity, clicking heard with Flexion and extension.   NVI   Muscle strength 5/5.     Skin:     General: Skin is warm and dry.      Capillary Refill: Capillary refill takes less than 2 seconds.      Findings: Rash present.   Neurological:      General: No focal deficit present.      Mental Status: He is alert and oriented to person, place, and time.   Psychiatric:         Mood and Affect: Mood normal.         Behavior: Behavior normal.         Thought Content: Thought content normal.         Judgment: Judgment normal.           Preliminary reading left knee  ? Arthritis  Waiting on rad read

## 2024-08-15 ENCOUNTER — OFFICE VISIT (OUTPATIENT)
Dept: OBGYN CLINIC | Facility: CLINIC | Age: 64
End: 2024-08-15
Payer: MEDICARE

## 2024-08-15 VITALS
SYSTOLIC BLOOD PRESSURE: 115 MMHG | WEIGHT: 245 LBS | HEART RATE: 99 BPM | DIASTOLIC BLOOD PRESSURE: 75 MMHG | HEIGHT: 72 IN | BODY MASS INDEX: 33.18 KG/M2

## 2024-08-15 DIAGNOSIS — M17.12 PRIMARY OSTEOARTHRITIS OF LEFT KNEE: Primary | ICD-10-CM

## 2024-08-15 DIAGNOSIS — G89.29 CHRONIC PAIN OF LEFT KNEE: ICD-10-CM

## 2024-08-15 DIAGNOSIS — M25.562 CHRONIC PAIN OF LEFT KNEE: ICD-10-CM

## 2024-08-15 PROCEDURE — 20610 DRAIN/INJ JOINT/BURSA W/O US: CPT | Performed by: STUDENT IN AN ORGANIZED HEALTH CARE EDUCATION/TRAINING PROGRAM

## 2024-08-15 PROCEDURE — 99214 OFFICE O/P EST MOD 30 MIN: CPT | Performed by: STUDENT IN AN ORGANIZED HEALTH CARE EDUCATION/TRAINING PROGRAM

## 2024-08-15 RX ADMIN — METHYLPREDNISOLONE ACETATE 1 ML: 40 INJECTION, SUSPENSION INTRA-ARTICULAR; INTRALESIONAL; INTRAMUSCULAR; SOFT TISSUE at 15:00

## 2024-08-15 RX ADMIN — LIDOCAINE HYDROCHLORIDE 4 ML: 20 INJECTION, SOLUTION INFILTRATION; PERINEURAL at 15:00

## 2024-08-15 RX ADMIN — BUPIVACAINE HYDROCHLORIDE 4 ML: 2.5 INJECTION, SOLUTION INFILTRATION; PERINEURAL at 15:00

## 2024-08-15 NOTE — PROGRESS NOTES
[facility] Sports Medicine Knee New Patient Visit     Assesment:   64 y.o. male with chronic left knee pain and x-ray showing moderate osteoarthritis    Plan:  I reviewed the history, exam, and imaging with the patient in clinic today.  I discussed with the patient that their knee pain was due to osteoarthritis as noted on imaging and consistent with the exam in clinic today.  I discussed the natural history of osteoarthritis.  I discussed conservative treatment options including activity modification, weight loss, oral pain medications including Tylenol and anti-inflammatories, physical therapy, and injections.  We also discussed surgical intervention.  After a long discussion with the patient, the patient opted for a corticosteroid injection.  I discussed the risks and benefits of a corticosteroid injection, including risk of a flare reaction, infection, and increase in blood sugar particularly in the setting of diabetes.  After discussing the risks, the patient was still amenable to the injection.  I performed a corticosteroid injection into the left knee.  The patient tolerated the procedure well with no adverse reaction.  The patient was distally neurovascular intact after the injection. The patient demonstrated understanding of our discussion and was in agreement with the plan.  All of their questions were answered.  I will see them back in clinic as needed.  In the meantime, the patient can reach out to the clinic with any further questions or concerns at any time      Conservative treatment:  Tylenol for pain.  Weightbearing as tolerated.     Imaging:  All imaging from today was reviewed by myself and explained to the patient.     Injection:  The risks and benefits of the injection (which include but are not limited to: infection, bleeding,damage to nerve/artery, need for further intervention), as well as the risks and benefits of all alternative treatments were explained and understood.  The patient elected to  proceed with injection.  The procedure was done with aseptic technique, and the patient tolerated the procedure well with no complications.  A corticosteroid injection was performed.  May consider future corticosteroid injection depending on clinical exam/imaging.    Surgery:  No surgery is recommended at this point, continue with conservative treatment plan as noted.    Follow up:  Return if symptoms worsen or fail to improve.        Chief Complaint   Patient presents with    Left Knee - Pain       History of Present Illness:  The patient is a 64 y.o. male seen in clinic for left knee pain. The patient reports chronic left knee pain, with no recent mechanism of injury or trauma. The patient has a history of a right TKA completed at [facility] several years ago. He states that the pain is similar to his right knee prior to the replacement. The patient states that his pain is present with terminal knee extension and weightbearing activities. The patient states that the pain is present in the medial aspect of the knee. He states that he used to walk his dog 3 laps at the local park, however, he can hardly walk 1 lap as of recent due to the pain. He denies instability or buckling. He states that he occasionally takes tylenol for pain management.     Occupation: Retired     The patient has the following co-morbidities: Type II Diabetes (Last A1C 6.9 from 5/2/24), Stage 3a CKD, A-fib       Knee Surgical History:  None    Past Medical, Social and Family History:  Past Medical History:   Diagnosis Date    Bilateral flank pain 05/11/2023    CAD (coronary artery disease)     nonobstructive RCA via CT 2013    Colon polyp     COPD (chronic obstructive pulmonary disease) (HCC)     Diabetes mellitus (HCC)     GERD (gastroesophageal reflux disease)     Heart murmur     Hyperlipidemia     Hypertension     PAF (paroxysmal atrial fibrillation) (HCC)     PONV (postoperative nausea and vomiting)     Sleep apnea      Past Surgical  History:   Procedure Laterality Date    APPENDECTOMY N/A     JOINT REPLACEMENT Right     MYOTOMY HELLER LAPAROSCOPIC  2018    [place]    STOMACH SURGERY       No Known Allergies  Current Outpatient Medications on File Prior to Visit   Medication Sig Dispense Refill    aspirin 81 mg chewable tablet Chew 81 mg daily      atorvastatin (LIPITOR) 40 mg tablet Take 1 tablet (40 mg total) by mouth every evening 90 tablet 5    esomeprazole (NexIUM) 40 MG capsule Take 1 capsule (40 mg total) by mouth in the morning 90 capsule 3    fenofibrate (TRICOR) 145 mg tablet Take 1 tablet (145 mg total) by mouth daily 100 tablet 1    Jardiance 10 MG TABS tablet TAKE 1 TABLET BY MOUTH EVERY DAY 90 tablet 1    ketoconazole (NIZORAL) 2 % cream Apply topically daily for 14 days 15 g 0    lidocaine (Lidoderm) 5 % Apply 1 patch topically over 12 hours daily Remove & Discard patch within 12 hours or as directed by MD (Patient taking differently: Apply 1 patch topically daily Remove & Discard patch within 12 hours or as directed by MD PRN) 30 patch 1    lisinopril (ZESTRIL) 5 mg tablet Take 1 tablet (5 mg total) by mouth daily 90 tablet 5    metFORMIN (GLUCOPHAGE) 500 mg tablet Take 1 tablet (500 mg total) by mouth daily with breakfast 90 tablet 3    methocarbamol (ROBAXIN) 500 mg tablet Take 1 tablet (500 mg total) by mouth 4 (four) times a day prn 60 tablet 1    nicotine polacrilex (COMMIT) 2 MG lozenge Apply 1 lozenge (2 mg total) to the mouth or throat every 6 (six) hours as needed for smoking cessation 100 each 1    sodium chloride 1 g tablet Take 1 tablet (1 g total) by mouth every other day 45 tablet 1    Sodium Fluoride 5000 PPM 1.1 % PSTE APPLY TO AFFECTED AREA NIGHTLY(NOT COVERED BY INSURANCE)      tamsulosin (FLOMAX) 0.4 mg Take 1 capsule (0.4 mg total) by mouth daily with dinner 90 capsule 3    amoxicillin (AMOXIL) 500 mg capsule Take 2,000 mg by mouth as needed 1 hour prior to dental appointment (Patient not taking: Reported on  8/12/2024)       No current facility-administered medications on file prior to visit.     Social History     Socioeconomic History    Marital status: /Civil Union     Spouse name: Not on file    Number of children: Not on file    Years of education: Not on file    Highest education level: Not on file   Occupational History    Not on file   Tobacco Use    Smoking status: Former     Types: Cigars     Passive exposure: Current    Smokeless tobacco: Never   Vaping Use    Vaping status: Never Used   Substance and Sexual Activity    Alcohol use: Yes     Comment: rarely    Drug use: Never    Sexual activity: Not Currently   Other Topics Concern    Not on file   Social History Narrative    Not on file     Social Determinants of Health     Financial Resource Strain: Not on file   Food Insecurity: No Food Insecurity (7/23/2024)    Hunger Vital Sign     Worried About Running Out of Food in the Last Year: Never true     Ran Out of Food in the Last Year: Never true   Transportation Needs: No Transportation Needs (7/23/2024)    PRAPARE - Transportation     Lack of Transportation (Medical): No     Lack of Transportation (Non-Medical): No   Physical Activity: Not on file   Stress: Not on file   Social Connections: Not on file   Intimate Partner Violence: Not on file   Housing Stability: Low Risk  (7/23/2024)    Housing Stability Vital Sign     Unable to Pay for Housing in the Last Year: No     Number of Times Moved in the Last Year: 1     Homeless in the Last Year: No         I have reviewed the past medical, surgical, social and family history, medications and allergies as documented in the EMR.    Review of systems: ROS is negative other than that noted in the HPI.  Constitutional: Negative for fatigue and fever.   HENT: Negative for sore throat.    Respiratory: Negative for shortness of breath.    Cardiovascular: Negative for chest pain.   Gastrointestinal: Negative for abdominal pain.   Endocrine: Negative for cold  intolerance and heat intolerance.   Genitourinary: Negative for flank pain.   Musculoskeletal: Negative for back pain.   Skin: Negative for rash.   Allergic/Immunologic: Negative for immunocompromised state.   Neurological: Negative for dizziness.   Psychiatric/Behavioral: Negative for agitation.      Physical Exam:    Blood pressure 115/75, pulse 99, height 6' (1.829 m), weight 111 kg (245 lb).    General/Constitutional: NAD, well developed, well nourished  HENT: Normocephalic, atraumatic  CV: Intact distal pulses, regular rate  Resp: No respiratory distress or labored breathing  Neuro: Alert and Oriented x 3  Psych: Normal mood, normal affect, normal judgement, normal behavior  Skin: Warm, dry, no rashes, no erythema      Focused left knee exam:  Ambulates with a normal gait.    Skin is intact. No evidence of ecchymosis, erythema, gross deformity or previous surgical incisions. negative effusion.     Negative squat and Thessaly tests.    Palpation of the knee demonstrates no tenderness over the medial patellar facet, lateral patellar facet, tibial tubercle or patellar tendon.  There is no tenderness over the pes anserine bursa.  There is no tenderness over Gerdy's tubercle. There is no tenderness in the popliteal fossa.  There is no tenderness over the medial or lateral epicondyle.  There is no tenderness with palpation over the posterior calf without palpable cords.    Range of motion testing demonstrates that the patient is able to achieve 0 degrees of extension and 120 degrees of flexion. There is no pain with hyperflexion or hyperextension.  There is negative patellar crepitus. The patient is able to do a straight leg raise.      Strength testing demonstrates 5/5 strength with hip flexion, 5/5 knee extension, 5/5 knee flexion, and 5/5 dorsiflexion and plantarflexion.    Provocation testing demonstrates  Mensicus- positive medial joint line tenderness, negative lateral joint line tenderness, negative  Delmis's, negative flexion compression.  Collateral ligaments- negative varus laxity at full extension and in 30° of knee flexion, negative valgus laxity at full extension and in 30° of knee flexion.  Cruciate ligament- 1A Lachman examination, negative pivot shift test, 1A anterior drawer, 1A posterior drawer, negative posterior sag.  Patella- negative apprehension with lateral patellar translation (able to sublux 1 quadrant with firm endpoint), negative apprehension with medial patellar translation (able to sublux 1 quadrant with firm endpoint), negative J-sign, negative patellar grind test, negative tight lateral patellar tilt.    Range of motion testing of the hip and ankle are within normal limits.    No calf tenderness to palpation bilaterally    LE NV Exam: +2 DP/PT pulses bilaterally  Sensation intact to light touch L2-S1 bilaterally, SPN/DPN/TA motor intact      Knee Imaging    X-rays of the left knee were obtained on 8/12/2024 and reviewed with the patient. Per my independent review, the imaging shows moderate tricompartmental osteoarthritis.     Large joint arthrocentesis: L knee  Universal Protocol:  Consent: Verbal consent obtained.  Consent given by: patient  Timeout called at: 8/15/2024 3:14 PM.  Patient understanding: patient states understanding of the procedure being performed  Patient consent: the patient's understanding of the procedure matches consent given  Site marked: the operative site was marked  Radiology Images displayed and confirmed. If images not available, report reviewed: imaging studies available  Patient identity confirmed: verbally with patient  Supporting Documentation  Indications: pain   Procedure Details  Location: knee - L knee  Needle gauge: 21 G.  Ultrasound guidance: no  Approach: anterolateral  Medications administered: 1 mL methylPREDNISolone acetate 40 mg/mL; 4 mL lidocaine 2 %; 4 mL bupivacaine 0.25 %    Patient tolerance: patient tolerated the procedure well with no  immediate complications  Dressing:  Sterile dressing applied          Scribe Attestation      I,:  Marialuisa Billingsley am acting as a scribe while in the presence of the attending physician.:       I,:  Lewis Pineda MD personally performed the services described in this documentation    as scribed in my presence.:

## 2024-08-17 RX ORDER — BUPIVACAINE HYDROCHLORIDE 2.5 MG/ML
4 INJECTION, SOLUTION INFILTRATION; PERINEURAL
Status: COMPLETED | OUTPATIENT
Start: 2024-08-15 | End: 2024-08-15

## 2024-08-17 RX ORDER — LIDOCAINE HYDROCHLORIDE 20 MG/ML
4 INJECTION, SOLUTION INFILTRATION; PERINEURAL
Status: COMPLETED | OUTPATIENT
Start: 2024-08-15 | End: 2024-08-15

## 2024-08-17 RX ORDER — METHYLPREDNISOLONE ACETATE 40 MG/ML
1 INJECTION, SUSPENSION INTRA-ARTICULAR; INTRALESIONAL; INTRAMUSCULAR; SOFT TISSUE
Status: COMPLETED | OUTPATIENT
Start: 2024-08-15 | End: 2024-08-15

## 2024-08-29 ENCOUNTER — OFFICE VISIT (OUTPATIENT)
Dept: OBGYN CLINIC | Facility: CLINIC | Age: 64
End: 2024-08-29
Payer: MEDICARE

## 2024-08-29 VITALS
SYSTOLIC BLOOD PRESSURE: 120 MMHG | WEIGHT: 245 LBS | BODY MASS INDEX: 33.18 KG/M2 | HEART RATE: 69 BPM | DIASTOLIC BLOOD PRESSURE: 78 MMHG | HEIGHT: 72 IN

## 2024-08-29 DIAGNOSIS — M17.12 PRIMARY OSTEOARTHRITIS OF LEFT KNEE: Primary | ICD-10-CM

## 2024-08-29 PROCEDURE — 99213 OFFICE O/P EST LOW 20 MIN: CPT | Performed by: STUDENT IN AN ORGANIZED HEALTH CARE EDUCATION/TRAINING PROGRAM

## 2024-08-29 NOTE — PROGRESS NOTES
[facility]tho Sports Medicine Knee New Patient Visit     Assesment:   64 y.o. male with chronic left knee pain and x-ray showing moderate osteoarthritis    Plan:  I reviewed the history, exam, and imaging with the patient in clinic today. The patient continues to experience pain related to the osteoarthritis of the left knee. The patient received a cortisone injection to his left knee at his previous appointment, with very limited short term relief. The patient would like to exhaust all conservative options prior to proceeding with knee surgery. Pre-authorization was ordered for visco supplementation injections. The patient will follow-up once the visco injections are approved.The patient demonstrated understanding of our discussion and was in agreement with the plan.  All of their questions were answered.  I will see them back in clinic as needed.  In the meantime, the patient can reach out to the clinic with any further questions or concerns at any time        Chief Complaint   Patient presents with   • Left Knee - Follow-up, Pain       History of Present Illness:  The patient is a 64 y.o. male presents for follow-up evaluation of his left knee. The patient was last seen on 8/15/2024 where he was provided a cortisone injection to his left knee for treatment of osteoarthritis. The patient states that he experienced approximately 2-3 days of relief from the injection. However, his pain has returned to baseline. He rates his pain as an 6/10. The patient continues to experience pain with walking activities. His pain is present along the medial aspect of the knee. He has a history of a right TKA.  The patient is considering surgical intervention, however, he would like to continue with conservative care at this time.      Occupation: Retired     The patient has the following co-morbidities: Type II Diabetes (Last A1C 6.9 from 5/2/24), Stage 3a CKD, A-fib       Knee Surgical History:  None    Past Medical, Social and Family  History:  Past Medical History:   Diagnosis Date   • Bilateral flank pain 05/11/2023   • CAD (coronary artery disease)     nonobstructive RCA via CT 2013   • Colon polyp    • COPD (chronic obstructive pulmonary disease) ([facility])    • Diabetes mellitus ([facility])    • GERD (gastroesophageal reflux disease)    • Heart murmur    • Hyperlipidemia    • Hypertension    • PAF (paroxysmal atrial fibrillation) ([facility])    • PONV (postoperative nausea and vomiting)    • Sleep apnea      Past Surgical History:   Procedure Laterality Date   • APPENDECTOMY N/A    • JOINT REPLACEMENT Right    • MYOTOMY HELLER LAPAROSCOPIC  2018    [place]   • STOMACH SURGERY       No Known Allergies  Current Outpatient Medications on File Prior to Visit   Medication Sig Dispense Refill   • aspirin 81 mg chewable tablet Chew 81 mg daily     • atorvastatin (LIPITOR) 40 mg tablet Take 1 tablet (40 mg total) by mouth every evening 90 tablet 5   • esomeprazole (NexIUM) 40 MG capsule Take 1 capsule (40 mg total) by mouth in the morning 90 capsule 3   • fenofibrate (TRICOR) 145 mg tablet Take 1 tablet (145 mg total) by mouth daily 100 tablet 1   • Jardiance 10 MG TABS tablet TAKE 1 TABLET BY MOUTH EVERY DAY 90 tablet 1   • lidocaine (Lidoderm) 5 % Apply 1 patch topically over 12 hours daily Remove & Discard patch within 12 hours or as directed by MD (Patient taking differently: Apply 1 patch topically daily Remove & Discard patch within 12 hours or as directed by MD PRN) 30 patch 1   • lisinopril (ZESTRIL) 5 mg tablet Take 1 tablet (5 mg total) by mouth daily 90 tablet 5   • metFORMIN (GLUCOPHAGE) 500 mg tablet Take 1 tablet (500 mg total) by mouth daily with breakfast 90 tablet 3   • methocarbamol (ROBAXIN) 500 mg tablet Take 1 tablet (500 mg total) by mouth 4 (four) times a day prn 60 tablet 1   • nicotine polacrilex (COMMIT) 2 MG lozenge Apply 1 lozenge (2 mg total) to the mouth or throat every 6 (six) hours as needed for smoking cessation 100 each 1   • sodium  chloride 1 g tablet Take 1 tablet (1 g total) by mouth every other day 45 tablet 1   • Sodium Fluoride 5000 PPM 1.1 % PSTE APPLY TO AFFECTED AREA NIGHTLY(NOT COVERED BY INSURANCE)     • tamsulosin (FLOMAX) 0.4 mg Take 1 capsule (0.4 mg total) by mouth daily with dinner 90 capsule 3   • amoxicillin (AMOXIL) 500 mg capsule Take 2,000 mg by mouth as needed 1 hour prior to dental appointment (Patient not taking: Reported on 8/12/2024)     • ketoconazole (NIZORAL) 2 % cream Apply topically daily for 14 days 15 g 0     No current facility-administered medications on file prior to visit.     Social History     Socioeconomic History   • Marital status: /Civil Union     Spouse name: Not on file   • Number of children: Not on file   • Years of education: Not on file   • Highest education level: Not on file   Occupational History   • Not on file   Tobacco Use   • Smoking status: Former     Types: Cigars     Passive exposure: Current   • Smokeless tobacco: Never   Vaping Use   • Vaping status: Never Used   Substance and Sexual Activity   • Alcohol use: Yes     Comment: rarely   • Drug use: Never   • Sexual activity: Not Currently   Other Topics Concern   • Not on file   Social History Narrative   • Not on file     Social Determinants of Health     Financial Resource Strain: Not on file   Food Insecurity: No Food Insecurity (7/23/2024)    Hunger Vital Sign    • Worried About Running Out of Food in the Last Year: Never true    • Ran Out of Food in the Last Year: Never true   Transportation Needs: No Transportation Needs (7/23/2024)    PRAPARE - Transportation    • Lack of Transportation (Medical): No    • Lack of Transportation (Non-Medical): No   Physical Activity: Not on file   Stress: Not on file   Social Connections: Not on file   Intimate Partner Violence: Not on file   Housing Stability: Low Risk  (7/23/2024)    Housing Stability Vital Sign    • Unable to Pay for Housing in the Last Year: No    • Number of Times  Moved in the Last Year: 1    • Homeless in the Last Year: No         I have reviewed the past medical, surgical, social and family history, medications and allergies as documented in the EMR.    Review of systems: ROS is negative other than that noted in the HPI.  Constitutional: Negative for fatigue and fever.   HENT: Negative for sore throat.    Respiratory: Negative for shortness of breath.    Cardiovascular: Negative for chest pain.   Gastrointestinal: Negative for abdominal pain.   Endocrine: Negative for cold intolerance and heat intolerance.   Genitourinary: Negative for flank pain.   Musculoskeletal: Negative for back pain.   Skin: Negative for rash.   Allergic/Immunologic: Negative for immunocompromised state.   Neurological: Negative for dizziness.   Psychiatric/Behavioral: Negative for agitation.      Physical Exam:    Blood pressure 120/78, pulse 69, height 6' (1.829 m), weight 111 kg (245 lb).    General/Constitutional: NAD, well developed, well nourished  HENT: Normocephalic, atraumatic  CV: Intact distal pulses, regular rate  Resp: No respiratory distress or labored breathing  Neuro: Alert and Oriented x 3  Psych: Normal mood, normal affect, normal judgement, normal behavior  Skin: Warm, dry, no rashes, no erythema      Focused left knee exam:  Ambulates with a normal gait.    Skin is intact. No evidence of ecchymosis, erythema, gross deformity or previous surgical incisions. negative effusion.     Negative squat and Thessaly tests.    Palpation of the knee demonstrates no tenderness over the medial patellar facet, lateral patellar facet, tibial tubercle or patellar tendon.  There is no tenderness over the pes anserine bursa.  There is no tenderness over Gerdy's tubercle. There is no tenderness in the popliteal fossa.  There is no tenderness over the medial or lateral epicondyle.  There is no tenderness with palpation over the posterior calf without palpable cords.    Range of motion testing  demonstrates that the patient is able to achieve 0 degrees of extension and 120 degrees of flexion. There is no pain with hyperflexion or hyperextension.  There is negative patellar crepitus. The patient is able to do a straight leg raise.      Strength testing demonstrates 5/5 strength with hip flexion, 5/5 knee extension, 5/5 knee flexion, and 5/5 dorsiflexion and plantarflexion.    Provocation testing demonstrates  Mensicus- positive medial joint line tenderness, negative lateral joint line tenderness, negative Delmis's, negative flexion compression.  Collateral ligaments- negative varus laxity at full extension and in 30° of knee flexion, negative valgus laxity at full extension and in 30° of knee flexion.  Cruciate ligament- 1A Lachman examination, negative pivot shift test, 1A anterior drawer, 1A posterior drawer, negative posterior sag.  Patella- negative apprehension with lateral patellar translation (able to sublux 1 quadrant with firm endpoint), negative apprehension with medial patellar translation (able to sublux 1 quadrant with firm endpoint), negative J-sign, negative patellar grind test, negative tight lateral patellar tilt.    Range of motion testing of the hip and ankle are within normal limits.    No calf tenderness to palpation bilaterally    LE NV Exam: +2 DP/PT pulses bilaterally  Sensation intact to light touch L2-S1 bilaterally, SPN/DPN/TA motor intact      Knee Imaging    X-rays of the left knee were obtained on 8/12/2024 and reviewed with the patient. Per my independent review, the imaging shows moderate tricompartmental osteoarthritis.         Scribe Attestation    I,:  Marialuisa Billingsley am acting as a scribe while in the presence of the attending physician.:       I,:  Lewis Pineda MD personally performed the services described in this documentation    as scribed in my presence.:

## 2024-09-06 ENCOUNTER — TELEPHONE (OUTPATIENT)
Dept: OBGYN CLINIC | Facility: HOSPITAL | Age: 64
End: 2024-09-06

## 2024-09-06 NOTE — TELEPHONE ENCOUNTER
Caller: Rinku    Doctor: Edwin    Reason for call: Patient is calling because he was order visco, it says not to schedule till after 09/27. Visco team advised you like to wait 6 weeks between visco and cortisone, patient was not explained this and he is in a lot of pain. Asking to schedule this ASAP.    Please call and schedule if okay to schedule sooner.     Call back#: [phone]

## 2024-09-10 ENCOUNTER — HOSPITAL ENCOUNTER (OUTPATIENT)
Dept: GASTROENTEROLOGY | Facility: HOSPITAL | Age: 64
Setting detail: OUTPATIENT SURGERY
Discharge: HOME | End: 2024-09-10
Attending: STUDENT IN AN ORGANIZED HEALTH CARE EDUCATION/TRAINING PROGRAM
Payer: MEDICARE

## 2024-09-10 ENCOUNTER — ANESTHESIA EVENT (OUTPATIENT)
Dept: GASTROENTEROLOGY | Facility: HOSPITAL | Age: 64
End: 2024-09-10
Payer: MEDICARE

## 2024-09-10 ENCOUNTER — ANESTHESIA (OUTPATIENT)
Dept: GASTROENTEROLOGY | Facility: HOSPITAL | Age: 64
End: 2024-09-10
Payer: MEDICARE

## 2024-09-10 VITALS
OXYGEN SATURATION: 98 % | BODY MASS INDEX: 34.13 KG/M2 | HEIGHT: 72 IN | DIASTOLIC BLOOD PRESSURE: 70 MMHG | TEMPERATURE: 97.4 F | SYSTOLIC BLOOD PRESSURE: 125 MMHG | RESPIRATION RATE: 18 BRPM | WEIGHT: 252 LBS | HEART RATE: 66 BPM

## 2024-09-10 DIAGNOSIS — K63.5 CECAL POLYP: ICD-10-CM

## 2024-09-10 LAB — GLUCOSE SERPL-MCNC: 102 MG/DL (ref 65–140)

## 2024-09-10 PROCEDURE — 82948 REAGENT STRIP/BLOOD GLUCOSE: CPT

## 2024-09-10 PROCEDURE — 88305 TISSUE EXAM BY PATHOLOGIST: CPT | Performed by: PATHOLOGY

## 2024-09-10 PROCEDURE — 45390 COLONOSCOPY W/RESECTION: CPT | Performed by: INTERNAL MEDICINE

## 2024-09-10 RX ORDER — PROPOFOL 10 MG/ML
INJECTION, EMULSION INTRAVENOUS AS NEEDED
Status: DISCONTINUED | OUTPATIENT
Start: 2024-09-10 | End: 2024-09-10

## 2024-09-10 RX ORDER — CEFAZOLIN SODIUM 2 G/50ML
SOLUTION INTRAVENOUS AS NEEDED
Status: DISCONTINUED | OUTPATIENT
Start: 2024-09-10 | End: 2024-09-10

## 2024-09-10 RX ORDER — SODIUM CHLORIDE 9 MG/ML
INJECTION, SOLUTION INTRAVENOUS CONTINUOUS PRN
Status: DISCONTINUED | OUTPATIENT
Start: 2024-09-10 | End: 2024-09-10

## 2024-09-10 RX ADMIN — PROPOFOL 120 MCG/KG/MIN: 10 INJECTION, EMULSION INTRAVENOUS at 08:40

## 2024-09-10 RX ADMIN — PROPOFOL 80 MG: 10 INJECTION, EMULSION INTRAVENOUS at 08:39

## 2024-09-10 RX ADMIN — CEFAZOLIN SODIUM 2000 MG: 2 SOLUTION INTRAVENOUS at 09:05

## 2024-09-10 RX ADMIN — SODIUM CHLORIDE: 0.9 INJECTION, SOLUTION INTRAVENOUS at 08:35

## 2024-09-10 RX ADMIN — Medication 40 MG: at 09:34

## 2024-09-10 NOTE — H&P
History and Physical - [facility]  Robert Benson 64 y.o. male MRN: [id number]                  HPI: Robert Benson is a 64 y.o. year old male who presents for colonoscopy with EMR of cecal polyp      REVIEW OF SYSTEMS: Per the HPI, and otherwise unremarkable.    Historical Information   Past Medical History:   Diagnosis Date    Bilateral flank pain 05/11/2023    CAD (coronary artery disease)     nonobstructive RCA via CT 2013    CKD (chronic kidney disease)     Colon polyp     COPD (chronic obstructive pulmonary disease) (HCC)     Diabetes mellitus (HCC)     GERD (gastroesophageal reflux disease)     Heart murmur     Hyperlipidemia     Hypertension     PAF (paroxysmal atrial fibrillation) (HCC)     PONV (postoperative nausea and vomiting)     Sleep apnea     cpap     Past Surgical History:   Procedure Laterality Date    APPENDECTOMY N/A     JOINT REPLACEMENT Right     MYOTOMY HELLER LAPAROSCOPIC  2018    [place]    STOMACH SURGERY       Social History   Social History     Substance and Sexual Activity   Alcohol Use Yes    Comment: rarely     Social History     Substance and Sexual Activity   Drug Use Never     Social History     Tobacco Use   Smoking Status Former    Types: Cigars    Passive exposure: Current   Smokeless Tobacco Never     Family History   Problem Relation Age of Onset    Cancer Mother         stomach    Heart attack Father     Cancer Sister         bladder    Cancer Brother         colon       Meds/Allergies       Current Outpatient Medications:     aspirin 81 mg chewable tablet    atorvastatin (LIPITOR) 40 mg tablet    esomeprazole (NexIUM) 40 MG capsule    fenofibrate (TRICOR) 145 mg tablet    lisinopril (ZESTRIL) 5 mg tablet    sodium chloride 1 g tablet    amoxicillin (AMOXIL) 500 mg capsule    Jardiance 10 MG TABS tablet    ketoconazole (NIZORAL) 2 % cream    lidocaine (Lidoderm) 5 %    metFORMIN (GLUCOPHAGE) 500 mg tablet    methocarbamol (ROBAXIN) 500 mg tablet    nicotine  polacrilex (COMMIT) 2 MG lozenge    Sodium Fluoride 5000 PPM 1.1 % PSTE    tamsulosin (FLOMAX) 0.4 mg    No Known Allergies    Objective     /73   Pulse 64   Temp 98 °F (36.7 °C) (Tympanic)   Resp 18   Ht 6' (1.829 m)   Wt 114 kg (252 lb)   SpO2 97%   BMI 34.18 kg/m²       PHYSICAL EXAM    Gen: NAD  Head: NCAT  CV: RRR  CHEST: Clear  ABD: soft, NT/ND  EXT: no edema      ASSESSMENT/PLAN:  This is a 64 y.o. year old male here for colonoscopy, and he is stable and optimized for his procedure.

## 2024-09-10 NOTE — ANESTHESIA PREPROCEDURE EVALUATION
Procedure:  COLONOSCOPY    Relevant Problems   CARDIO   (+) Essential hypertension   (+) Paroxysmal A-fib (HCC)      ENDO   (+) Diabetes mellitus, type II (HCC)      /RENAL   (+) Chronic kidney disease (CKD), stage II (mild)   (+) Stage 3a chronic kidney disease (HCC)        Physical Exam    Airway    Mallampati score: III  TM Distance: >3 FB  Neck ROM: full     Dental   No notable dental hx     Cardiovascular  Rhythm: regular, Rate: normal, Cardiovascular exam normal    Pulmonary  Pulmonary exam normal Breath sounds clear to auscultation    Other Findings  post-pubertal.      Anesthesia Plan  ASA Score- 3     Anesthesia Type- IV sedation with anesthesia with ASA Monitors.         Additional Monitors:     Airway Plan:            Plan Factors-Exercise tolerance (METS): >4 METS.    Chart reviewed. EKG reviewed. Imaging results reviewed. Existing labs reviewed. Patient summary reviewed.    Patient is not a current smoker.  Patient instructed to abstain from smoking on day of procedure. Patient did not smoke on day of surgery.    Obstructive sleep apnea risk education given perioperatively.        Induction- intravenous.    Postoperative Plan-     Perioperative Resuscitation Plan - Level 1 - Full Code.       Informed Consent- Anesthetic plan and risks discussed with patient and spouse.  I personally reviewed this patient with the CRNA. Discussed and agreed on the Anesthesia Plan with the CRNA..

## 2024-09-10 NOTE — ANESTHESIA POSTPROCEDURE EVALUATION
Post-Op Assessment Note    CV Status:  Stable  Pain Score: 0    Pain management: adequate       Mental Status:  Alert and awake   Hydration Status:  Euvolemic   PONV Controlled:  Controlled   Airway Patency:  Patent  Two or more mitigation strategies used for obstructive sleep apnea   Post Op Vitals Reviewed: Yes    No anethesia notable event occurred.    Staff: ÓSCAR               /64 (09/10/24 0942)    Temp (!) 97.4 °F (36.3 °C) (09/10/24 0942)    Pulse 71 (09/10/24 0942)   Resp 18 (09/10/24 0942)    SpO2 97 % (09/10/24 0942)

## 2024-09-12 PROCEDURE — 88305 TISSUE EXAM BY PATHOLOGIST: CPT | Performed by: PATHOLOGY

## 2024-09-17 ENCOUNTER — APPOINTMENT (OUTPATIENT)
Dept: RADIOLOGY | Facility: CLINIC | Age: 64
End: 2024-09-17
Payer: MEDICARE

## 2024-09-17 ENCOUNTER — OFFICE VISIT (OUTPATIENT)
Dept: OBGYN CLINIC | Facility: CLINIC | Age: 64
End: 2024-09-17
Payer: MEDICARE

## 2024-09-17 VITALS
SYSTOLIC BLOOD PRESSURE: 120 MMHG | HEIGHT: 72 IN | HEART RATE: 74 BPM | WEIGHT: 252 LBS | DIASTOLIC BLOOD PRESSURE: 71 MMHG | BODY MASS INDEX: 34.13 KG/M2

## 2024-09-17 DIAGNOSIS — Z01.812 PRE-OPERATIVE LABORATORY EXAMINATION: ICD-10-CM

## 2024-09-17 DIAGNOSIS — M17.12 PRIMARY OSTEOARTHRITIS OF LEFT KNEE: ICD-10-CM

## 2024-09-17 DIAGNOSIS — M17.12 PRIMARY OSTEOARTHRITIS OF LEFT KNEE: Primary | ICD-10-CM

## 2024-09-17 PROCEDURE — 73562 X-RAY EXAM OF KNEE 3: CPT

## 2024-09-17 PROCEDURE — 99213 OFFICE O/P EST LOW 20 MIN: CPT | Performed by: ORTHOPAEDIC SURGERY

## 2024-09-17 RX ORDER — FOLIC ACID 1 MG/1
1 TABLET ORAL DAILY
Qty: 30 TABLET | Refills: 1 | Status: SHIPPED | OUTPATIENT
Start: 2024-10-17 | End: 2024-12-16

## 2024-09-17 RX ORDER — CEFAZOLIN SODIUM 2 G/50ML
2000 SOLUTION INTRAVENOUS ONCE
OUTPATIENT
Start: 2024-09-17 | End: 2024-09-17

## 2024-09-17 RX ORDER — CHLORHEXIDINE GLUCONATE 40 MG/ML
SOLUTION TOPICAL DAILY PRN
OUTPATIENT
Start: 2024-09-17

## 2024-09-17 RX ORDER — CHLORHEXIDINE GLUCONATE ORAL RINSE 1.2 MG/ML
15 SOLUTION DENTAL ONCE
OUTPATIENT
Start: 2024-09-17 | End: 2024-09-17

## 2024-09-17 RX ORDER — ASCORBIC ACID 500 MG
500 TABLET ORAL 2 TIMES DAILY
Qty: 60 TABLET | Refills: 1 | Status: SHIPPED | OUTPATIENT
Start: 2024-10-17 | End: 2024-12-16

## 2024-09-17 RX ORDER — MULTIVIT-MIN/IRON FUM/FOLIC AC 7.5 MG-4
1 TABLET ORAL DAILY
Qty: 30 TABLET | Refills: 1 | Status: SHIPPED | OUTPATIENT
Start: 2024-10-17 | End: 2024-12-16

## 2024-09-17 RX ORDER — FERROUS SULFATE 324(65)MG
324 TABLET, DELAYED RELEASE (ENTERIC COATED) ORAL
Qty: 60 TABLET | Refills: 1 | Status: SHIPPED | OUTPATIENT
Start: 2024-10-17 | End: 2024-12-16

## 2024-09-17 NOTE — PROGRESS NOTES
ASSESSMENT/PLAN:    Diagnoses and all orders for this visit:    Primary osteoarthritis of left knee  -     XR knee 3 vw left non injury; Future  -     Ambulatory referral to Physical Therapy; Future  -     Case request operating room: ARTHROPLASTY KNEE TOTAL; Standing  -     ascorbic acid (VITAMIN C) 500 MG tablet; Take 1 tablet (500 mg total) by mouth 2 (two) times a day Do not start before October 17, 2024.  -     ferrous sulfate 324 (65 Fe) mg; Take 1 tablet (324 mg total) by mouth 2 (two) times a day before meals Do not start before October 17, 2024.  -     folic acid (FOLVITE) 1 mg tablet; Take 1 tablet (1 mg total) by mouth daily Do not start before October 17, 2024.  -     Multiple Vitamins-Minerals (multivitamin with minerals) tablet; Take 1 tablet by mouth daily Do not start before October 17, 2024.  -     Case request operating room: ARTHROPLASTY KNEE TOTAL    Pre-operative laboratory examination  -     Comprehensive metabolic panel; Future  -     Hemoglobin A1C W/EAG Estimation; Future  -     CBC and differential; Future  -     Anemia Panel w/Reflex; Future  -     Protime-INR; Future  -     APTT; Future  -     Type and screen; Future  -     EKG 12 lead; Future    Other orders  -     Diet NPO; Sips with meds; Standing  -     Nursing Communication Warmimg Interventions Implemented; Standing  -     Nursing Communication CHG bath, have staff wash entire body (neck down) per pre-op bathing protocol. Routine, evening prior to, and day of surgery.; Standing  -     Nursing Communication Swab both nares with Povidone-Iodine solution, EXCLUDE if patient has shellfish/Iodine allergy, and replace with nasal alcohol swabstick. Routine, day of surgery, on call to OR; Standing  -     chlorhexidine (PERIDEX) 0.12 % oral rinse 15 mL  -     Void on call to OR; Standing  -     Insert peripheral IV; Standing  -     Place sequential compression device; Standing  -     ceFAZolin (ANCEF) IVPB (premix in dextrose) 2,000 mg 50  mL  -     chlorhexidine gluconate (HIBICLENS) 4 % topical liquid        X-rays of the patient's left knee are consistent with advanced arthritic changes with no joint space remaining.  The patient states that his symptoms are continuing to worsen and starting to affect his quality of life.  After exhausting conservative treatment, including corticosteroid injections and viscosupplementation, the risks and benefits of surgery were discussed with the patient.  He decided on an elective left knee replacement.  The patient surgery attending scheduled for 11/18/2024.    Return for surgery.    The patient has advanced arthritic changes of his left knee.  Treatment options were discussed.  He is opted elective left total knee replacement.  All risk, complications, and benefits were discussed with the patient in great detail clued bleeding, infection, blood clots, pain, stiffness, neurovascular damage, fractures, dislocations, the possibility loss of life from surgery, heart attack, wound problems, etc.  His surgery scheduled for November 18, 2024.  His last cortisone injection was on August 15, 2024      _____________________________________________________  CHIEF COMPLAINT:  Chief Complaint   Patient presents with    Left Knee - Follow-up         SUBJECTIVE:  Robert Benson is a 64 y.o. male who presents to our office complaining of left knee pain with worsening ambulatory function.  The patient does have a history of a right total knee replacement.  In the recent past, he has received corticosteroid injections and viscosupplementation which have not provided him with relief of symptoms.  He denies any numbness or tingling.  He denies any fever or chills.  His pain is worsened with prolonged activity and ambulation.    The following portions of the patient's history were reviewed and updated as appropriate: allergies, current medications, past family history, past medical history, past social history, past surgical history  and problem list.    PAST MEDICAL HISTORY:  Past Medical History:   Diagnosis Date    Bilateral flank pain 05/11/2023    CAD (coronary artery disease)     nonobstructive RCA via CT 2013    CKD (chronic kidney disease)     Colon polyp     COPD (chronic obstructive pulmonary disease) (HCC)     Diabetes mellitus (HCC)     GERD (gastroesophageal reflux disease)     Heart murmur     Hyperlipidemia     Hypertension     PAF (paroxysmal atrial fibrillation) (HCC)     PONV (postoperative nausea and vomiting)     Sleep apnea     cpap       PAST SURGICAL HISTORY:  Past Surgical History:   Procedure Laterality Date    APPENDECTOMY N/A     JOINT REPLACEMENT Right     MYOTOMY HE[place]ER [place]AROSCOPIC  2018    [place]    STOMACH SURGERY         FAMILY HISTORY:  Family History   Problem Relation Age of Onset    Cancer Mother         stomach    Heart attack Father     Cancer Sister         bladder    Cancer Brother         colon       SOCIAL HISTORY:  Social History     Tobacco Use    Smoking status: Former     Types: Cigars     Passive exposure: Current    Smokeless tobacco: Never   Vaping Use    Vaping status: Never Used   Substance Use Topics    Alcohol use: Yes     Comment: rarely    Drug use: Never       MEDICATIONS:    Current Outpatient Medications:     [START ON 10/17/2024] ascorbic acid (VITAMIN C) 500 MG tablet, Take 1 tablet (500 mg total) by mouth 2 (two) times a day Do not start before October 17, 2024., Disp: 60 tablet, Rfl: 1    aspirin 81 mg chewable tablet, Chew 81 mg daily, Disp: , Rfl:     atorvastatin (LIPITOR) 40 mg tablet, Take 1 tablet (40 mg total) by mouth every evening, Disp: 90 tablet, Rfl: 5    esomeprazole (NexIUM) 40 MG capsule, Take 1 capsule (40 mg total) by mouth in the morning, Disp: 90 capsule, Rfl: 3    fenofibrate (TRICOR) 145 mg tablet, Take 1 tablet (145 mg total) by mouth daily, Disp: 100 tablet, Rfl: 1    [START ON 10/17/2024] ferrous sulfate 324 (65 Fe) mg, Take 1 tablet (324 mg total) by mouth 2  (two) times a day before meals Do not start before October 17, 2024., Disp: 60 tablet, Rfl: 1    [START ON 10/17/2024] folic acid (FOLVITE) 1 mg tablet, Take 1 tablet (1 mg total) by mouth daily Do not start before October 17, 2024., Disp: 30 tablet, Rfl: 1    Jardiance 10 MG TABS tablet, TAKE 1 TABLET BY MOUTH EVERY DAY, Disp: 90 tablet, Rfl: 1    lidocaine (Lidoderm) 5 %, Apply 1 patch topically over 12 hours daily Remove & Discard patch within 12 hours or as directed by MD (Patient taking differently: Apply 1 patch topically daily Remove & Discard patch within 12 hours or as directed by MD PRN), Disp: 30 patch, Rfl: 1    lisinopril (ZESTRIL) 5 mg tablet, Take 1 tablet (5 mg total) by mouth daily, Disp: 90 tablet, Rfl: 5    metFORMIN (GLUCOPHAGE) 500 mg tablet, Take 1 tablet (500 mg total) by mouth daily with breakfast, Disp: 90 tablet, Rfl: 3    methocarbamol (ROBAXIN) 500 mg tablet, Take 1 tablet (500 mg total) by mouth 4 (four) times a day prn, Disp: 60 tablet, Rfl: 1    [START ON 10/17/2024] Multiple Vitamins-Minerals (multivitamin with minerals) tablet, Take 1 tablet by mouth daily Do not start before October 17, 2024., Disp: 30 tablet, Rfl: 1    nicotine polacrilex (COMMIT) 2 MG lozenge, Apply 1 lozenge (2 mg total) to the mouth or throat every 6 (six) hours as needed for smoking cessation, Disp: 100 each, Rfl: 1    sodium chloride 1 g tablet, Take 1 tablet (1 g total) by mouth every other day, Disp: 45 tablet, Rfl: 1    Sodium Fluoride 5000 PPM 1.1 % PSTE, APPLY TO AFFECTED AREA NIGHTLY(NOT COVERED BY INSURANCE), Disp: , Rfl:     tamsulosin (FLOMAX) 0.4 mg, Take 1 capsule (0.4 mg total) by mouth daily with dinner, Disp: 90 capsule, Rfl: 3    amoxicillin (AMOXIL) 500 mg capsule, Take 2,000 mg by mouth as needed 1 hour prior to dental appointment (Patient not taking: Reported on 8/12/2024), Disp: , Rfl:     ketoconazole (NIZORAL) 2 % cream, Apply topically daily for 14 days, Disp: 15 g, Rfl:  0    ALLERGIES:  No Known Allergies    ROS:  Review of Systems     Constitutional: Negative for fatigue, fever or loss of appetite.   HENT: Negative.    Respiratory: Negative for shortness of breath, dyspnea.    Cardiovascular: Negative for chest pain/tightness.   Gastrointestinal: Negative for abdominal pain, N/V.   Endocrine: Negative for cold/heat intolerance, unexplained weight loss/gain.   Genitourinary: Negative for flank pain, dysuria, hematuria.   Musculoskeletal: Positive for arthralgia   Skin: Negative for rash.    Neurological: Negative for numbness or tingling  Psychiatric/Behavioral: Negative for agitation.  _____________________________________________________  PHYSICAL EXAMINATION:    Blood pressure 120/71, pulse 74, height 6' (1.829 m), weight 114 kg (252 lb).    Constitutional: Oriented to person, place, and time. Appears well-developed and well-nourished. No distress.   HENT:   Head: Normocephalic.   Eyes: Conjunctivae are normal. Right eye exhibits no discharge. Left eye exhibits no discharge. No scleral icterus.   Cardiovascular: Normal rate.    Pulmonary/Chest: Effort normal.   Neurological: Alert and oriented to person, place, and time.   Skin: Skin is warm and dry. No rash noted. Not diaphoretic. No erythema. No pallor.   Psychiatric: Normal mood and affect. Behavior is normal. Judgment and thought content normal.      MUSCULOSKELETAL EXAMINATION:   Physical Exam  Ortho Exam    Left lower extremity is neurovascularly intact  Toes are pink and mobile   Compartments are soft  No warmth, erythema or ecchymosis  ROM of knee is from 5-115 degrees  Negative Lachman, drawer or pivot shift  No medial instability  Medial joint line tenderness, slight lateral joint line tenderness  Patellofemoral crepitation   Objective:  BP Readings from Last 1 Encounters:   09/17/24 120/71      Wt Readings from Last 1 Encounters:   09/17/24 114 kg (252 lb)        BMI:   Estimated body mass index is 34.18 kg/m² as  calculated from the following:    Height as of this encounter: 6' (1.829 m).    Weight as of this encounter: 114 kg (252 lb).        Scribe Attestation      I,:  Drake Vasquez PA-C am acting as a scribe while in the presence of the attending physician.:       I,:  Ehsan Rodriguez, DO personally performed the services described in this documentation    as scribed in my presence.:

## 2024-09-20 DIAGNOSIS — Z72.0 TOBACCO USE: ICD-10-CM

## 2024-09-20 RX ORDER — NICOTINE POLACRILEX 2 MG/1
LOZENGE ORAL
Qty: 81 LOZENGE | Refills: 2 | Status: SHIPPED | OUTPATIENT
Start: 2024-09-20

## 2024-10-14 ENCOUNTER — TELEPHONE (OUTPATIENT)
Dept: OBGYN CLINIC | Facility: HOSPITAL | Age: 64
End: 2024-10-14

## 2024-10-14 NOTE — TELEPHONE ENCOUNTER
Preoperative Elective Admission Assessment- spoke to patient and spouse    Living Situation:    Who does pt live with: spouse  What kind of home: multi-level  How do they enter the home: front  How many levels in home: 2 level    # of steps to enter home: 1 to enter   # of steps to second floor: 12-14  to 2nd floor   Are there handrails: Yes  Are there landings: No  Sleeping arrangement: second floor  Where is Bathroom: First 1/2 bath   Where is the tub or shower: Second floor full bath with a walk in shower and step in tub.     First Floor Setup:   Is there a bathroom: Yes  Where would pt sleep: couch     DME: rolling walker - instructed to bring DOS.     We discussed clearing pathways in the home and making sure there is accessibly to use the walker, for example, removing throw rugs.      Patient's Current Level of Function: Ambulates: Independently and ADLs: Independent    Post-op Caregiver: spouse  Currently receive any HHC/aides/community supports: No     Post-op Transport: spouse  To/from hospital: spouse  To/from PT 2-3x/week: spouse  Uses community transport now: No     Outpatient Physical Therapy Site:  Site: [facility]   pre and post-op appts scheduled? Yes     Medication Management: self  Preferred Pharmacy for Post-op Medications: [id number] - [place] - [address] [place] [[id number]]   Blood Management Vitamin Regimen:  Pt hast at home to start 30 days before surgery   Post-op anticoagulant: to be determined by surgical team postoperatively     DC Plan: Pt plans to be discharged home    Barriers to DC identified preoperatively: none identified    BMI: 34.18    Patient Education:  Pt educated on post-op pain, early mobilization (POD0), LOS goals, OP PT goals, and preoperative bathing. Patient educated that our goal is to appropriately discharge patient based off their post-op function while striving to maintain maximal independence. The goal is to discharge patient to home and for them to  attend outpatient physical therapy.    Assigned to care team? Yes

## 2024-10-15 ENCOUNTER — OFFICE VISIT (OUTPATIENT)
Dept: CARDIOLOGY CLINIC | Facility: CLINIC | Age: 64
End: 2024-10-15
Payer: MEDICARE

## 2024-10-15 VITALS
DIASTOLIC BLOOD PRESSURE: 74 MMHG | HEART RATE: 86 BPM | RESPIRATION RATE: 18 BRPM | OXYGEN SATURATION: 98 % | HEIGHT: 69 IN | SYSTOLIC BLOOD PRESSURE: 126 MMHG | BODY MASS INDEX: 37.18 KG/M2 | WEIGHT: 251 LBS

## 2024-10-15 DIAGNOSIS — I10 ESSENTIAL HYPERTENSION: Primary | ICD-10-CM

## 2024-10-15 DIAGNOSIS — E78.5 DYSLIPIDEMIA: ICD-10-CM

## 2024-10-15 DIAGNOSIS — I48.0 PAROXYSMAL A-FIB (HCC): ICD-10-CM

## 2024-10-15 DIAGNOSIS — Z01.810 PRE-OPERATIVE CARDIOVASCULAR EXAMINATION: ICD-10-CM

## 2024-10-15 PROCEDURE — 93000 ELECTROCARDIOGRAM COMPLETE: CPT | Performed by: INTERNAL MEDICINE

## 2024-10-15 PROCEDURE — 99214 OFFICE O/P EST MOD 30 MIN: CPT | Performed by: INTERNAL MEDICINE

## 2024-10-15 NOTE — PROGRESS NOTES
" Patient ID: Robert Benson is a 64 y.o. male.        Plan:      Assessment & Plan  Pre-operative cardiovascular examination  Okay for knee replacement surgery at reasonable risk without further cardiac testing.  Essential hypertension  Adequately controlled.  Paroxysmal A-fib (HCC)  Last episode was in 2019.  He took Eliquis for a few years before this was discontinued.  He wears a smart watch and has had absolutely no recurrence.  Dyslipidemia  Tolerating high-intensity statin therapy and will continue current regimen.        Follow up Plan/Other summary comments:  Return in about 1 year (around 10/15/2025).    HPI: Patient is seen today principally regarding forthcoming right knee replacement.  Since the last visit no chest pain.  Ambulation is limited by knee discomfort but when necessary he can rush without chest pain or shortness of breath.        Results for orders placed or performed in visit on 10/15/24   POCT ECG    Impression    NSR. RBBB. Left axis. No change from prior.         Most recent or relevant cardiac/vascular testing:    PET stress test 2/21/2023: Normal.    Coronary CTA 8/29/2013: Normal.      Past Surgical History:   Procedure Laterality Date    APPENDECTOMY N/A     JOINT REPLACEMENT Right     MYOTOMY HELLER LAPAROSCOPIC  2018    [place]    STOMACH SURGERY         Lipid Profile: Reviewed      Review of Systems   10  point ROS  was otherwise non pertinent or negative except as per HPI or as below.   Gait:  Normal.        Objective:     /74 (BP Location: Left arm, Patient Position: Sitting, Cuff Size: Large)   Pulse 86   Resp 18   Ht 5' 9.29\" (1.76 m)   Wt 114 kg (251 lb)   SpO2 98%   BMI 36.76 kg/m²     PHYSICAL EXAM:    General:  Normal appearance in no distress.  Eyes:  Anicteric.  Oral mucosa:  Moist.  Neck:  No JVD. Carotid upstrokes are brisk without bruits.  No masses.  Chest:  Clear to auscultation.  Cardiac:  No palpable PMI.  Normal S1 and S2.  No murmur gallop or " rub.  Abdomen:  Soft and nontender. No palpable organomegaly or aortic enlargement.  Extremities:  No peripheral edema.  Musculoskeletal:  Symmetric.   Vascular:  Femoral pulses are brisk without bruits.  Popliteal pulses are intact bilaterally.   Pedal pulses are intact.  Neuro:  Grossly symmetric.  Psych:  Alert and oriented x3.      Meds reviewed.    Past Medical History:   Diagnosis Date    Bilateral flank pain 05/11/2023    CAD (coronary artery disease)     nonobstructive RCA via CT 2013    CKD (chronic kidney disease)     Colon polyp     COPD (chronic obstructive pulmonary disease) (HCC)     Diabetes mellitus (HCC)     GERD (gastroesophageal reflux disease)     Heart murmur     Hyperlipidemia     Hypertension     PAF (paroxysmal atrial fibrillation) (HCC)     PONV (postoperative nausea and vomiting)     Sleep apnea     cpap           Social History     Tobacco Use   Smoking Status Former    Types: Cigars    Passive exposure: Current   Smokeless Tobacco Never

## 2024-10-17 ENCOUNTER — APPOINTMENT (OUTPATIENT)
Age: 64
End: 2024-10-17
Payer: MEDICARE

## 2024-10-17 DIAGNOSIS — N18.2 CHRONIC KIDNEY DISEASE (CKD), STAGE II (MILD): ICD-10-CM

## 2024-10-17 DIAGNOSIS — R80.9 TYPE 2 DIABETES MELLITUS WITH DIABETIC MICROALBUMINURIA, WITHOUT LONG-TERM CURRENT USE OF INSULIN (HCC): ICD-10-CM

## 2024-10-17 DIAGNOSIS — I10 ESSENTIAL HYPERTENSION: ICD-10-CM

## 2024-10-17 DIAGNOSIS — R80.9 MICROALBUMINURIA: ICD-10-CM

## 2024-10-17 DIAGNOSIS — E11.29 TYPE 2 DIABETES MELLITUS WITH DIABETIC MICROALBUMINURIA, WITHOUT LONG-TERM CURRENT USE OF INSULIN (HCC): ICD-10-CM

## 2024-10-17 DIAGNOSIS — E87.1 HYPONATREMIA: ICD-10-CM

## 2024-10-17 DIAGNOSIS — Z01.812 PRE-OPERATIVE LABORATORY EXAMINATION: ICD-10-CM

## 2024-10-17 LAB
ABO GROUP BLD: NORMAL
ABO GROUP BLD: NORMAL
ALBUMIN SERPL BCG-MCNC: 4.1 G/DL (ref 3.5–5)
ALP SERPL-CCNC: 49 U/L (ref 34–104)
ALT SERPL W P-5'-P-CCNC: 28 U/L (ref 7–52)
ANION GAP SERPL CALCULATED.3IONS-SCNC: 10 MMOL/L (ref 4–13)
APTT PPP: 34 SECONDS (ref 23–34)
AST SERPL W P-5'-P-CCNC: 23 U/L (ref 13–39)
BACTERIA UR QL AUTO: ABNORMAL /HPF
BASOPHILS # BLD AUTO: 0.02 THOUSANDS/ΜL (ref 0–0.1)
BASOPHILS NFR BLD AUTO: 0 % (ref 0–1)
BILIRUB SERPL-MCNC: 0.6 MG/DL (ref 0.2–1)
BILIRUB UR QL STRIP: NEGATIVE
BLD GP AB SCN SERPL QL: NEGATIVE
BUN SERPL-MCNC: 16 MG/DL (ref 5–25)
CALCIUM SERPL-MCNC: 8.9 MG/DL (ref 8.4–10.2)
CHLORIDE SERPL-SCNC: 100 MMOL/L (ref 96–108)
CLARITY UR: CLEAR
CO2 SERPL-SCNC: 23 MMOL/L (ref 21–32)
COLOR UR: ABNORMAL
CREAT SERPL-MCNC: 1.3 MG/DL (ref 0.6–1.3)
CREAT UR-MCNC: 85.6 MG/DL
EOSINOPHIL # BLD AUTO: 0.34 THOUSAND/ΜL (ref 0–0.61)
EOSINOPHIL NFR BLD AUTO: 4 % (ref 0–6)
ERYTHROCYTE [DISTWIDTH] IN BLOOD BY AUTOMATED COUNT: 13.3 % (ref 11.6–15.1)
EST. AVERAGE GLUCOSE BLD GHB EST-MCNC: 148 MG/DL
FERRITIN SERPL-MCNC: 27 NG/ML (ref 24–336)
GFR SERPL CREATININE-BSD FRML MDRD: 57 ML/MIN/1.73SQ M
GLUCOSE P FAST SERPL-MCNC: 114 MG/DL (ref 65–99)
GLUCOSE UR STRIP-MCNC: ABNORMAL MG/DL
HBA1C MFR BLD: 6.8 %
HCT VFR BLD AUTO: 40.6 % (ref 36.5–49.3)
HGB BLD-MCNC: 12.9 G/DL (ref 12–17)
HGB UR QL STRIP.AUTO: NEGATIVE
IMM GRANULOCYTES # BLD AUTO: 0.03 THOUSAND/UL (ref 0–0.2)
IMM GRANULOCYTES NFR BLD AUTO: 0 % (ref 0–2)
INR PPP: 1.03 (ref 0.85–1.19)
IRON SATN MFR SERPL: 11 % (ref 15–50)
IRON SERPL-MCNC: 44 UG/DL (ref 50–212)
KETONES UR STRIP-MCNC: NEGATIVE MG/DL
LEUKOCYTE ESTERASE UR QL STRIP: ABNORMAL
LYMPHOCYTES # BLD AUTO: 1.99 THOUSANDS/ΜL (ref 0.6–4.47)
LYMPHOCYTES NFR BLD AUTO: 25 % (ref 14–44)
MCH RBC QN AUTO: 28.1 PG (ref 26.8–34.3)
MCHC RBC AUTO-ENTMCNC: 31.8 G/DL (ref 31.4–37.4)
MCV RBC AUTO: 89 FL (ref 82–98)
MICROALBUMIN UR-MCNC: 85.6 MG/L
MICROALBUMIN/CREAT 24H UR: 100 MG/G CREATININE (ref 0–30)
MONOCYTES # BLD AUTO: 0.62 THOUSAND/ΜL (ref 0.17–1.22)
MONOCYTES NFR BLD AUTO: 8 % (ref 4–12)
NEUTROPHILS # BLD AUTO: 5.08 THOUSANDS/ΜL (ref 1.85–7.62)
NEUTS SEG NFR BLD AUTO: 63 % (ref 43–75)
NITRITE UR QL STRIP: NEGATIVE
NON-SQ EPI CELLS URNS QL MICRO: ABNORMAL /HPF
NRBC BLD AUTO-RTO: 0 /100 WBCS
PH UR STRIP.AUTO: 7 [PH]
PLATELET # BLD AUTO: 271 THOUSANDS/UL (ref 149–390)
PMV BLD AUTO: 11.4 FL (ref 8.9–12.7)
POTASSIUM SERPL-SCNC: 4.2 MMOL/L (ref 3.5–5.3)
PROT SERPL-MCNC: 7.4 G/DL (ref 6.4–8.4)
PROT UR STRIP-MCNC: ABNORMAL MG/DL
PROTHROMBIN TIME: 13.8 SECONDS (ref 12.3–15)
PTH-INTACT SERPL-MCNC: 66.6 PG/ML (ref 12–88)
RBC # BLD AUTO: 4.59 MILLION/UL (ref 3.88–5.62)
RBC #/AREA URNS AUTO: ABNORMAL /HPF
RETICS # AUTO: NORMAL 10*3/UL (ref 14356–105094)
RETICS # CALC: 1.13 % (ref 0.37–1.87)
RH BLD: NEGATIVE
RH BLD: NEGATIVE
SODIUM SERPL-SCNC: 133 MMOL/L (ref 135–147)
SP GR UR STRIP.AUTO: 1.01 (ref 1–1.03)
SPECIMEN EXPIRATION DATE: NORMAL
TIBC SERPL-MCNC: 416 UG/DL (ref 250–450)
UIBC SERPL-MCNC: 372 UG/DL (ref 155–355)
URATE SERPL-MCNC: 4.9 MG/DL (ref 3.5–8.5)
UROBILINOGEN UR STRIP-ACNC: <2 MG/DL
WBC # BLD AUTO: 8.08 THOUSAND/UL (ref 4.31–10.16)
WBC #/AREA URNS AUTO: ABNORMAL /HPF

## 2024-10-17 PROCEDURE — 86900 BLOOD TYPING SEROLOGIC ABO: CPT

## 2024-10-17 PROCEDURE — 83540 ASSAY OF IRON: CPT

## 2024-10-17 PROCEDURE — 85610 PROTHROMBIN TIME: CPT

## 2024-10-17 PROCEDURE — 84550 ASSAY OF BLOOD/URIC ACID: CPT

## 2024-10-17 PROCEDURE — 82043 UR ALBUMIN QUANTITATIVE: CPT

## 2024-10-17 PROCEDURE — 83970 ASSAY OF PARATHORMONE: CPT

## 2024-10-17 PROCEDURE — 82570 ASSAY OF URINE CREATININE: CPT

## 2024-10-17 PROCEDURE — 83550 IRON BINDING TEST: CPT

## 2024-10-17 PROCEDURE — 85730 THROMBOPLASTIN TIME PARTIAL: CPT

## 2024-10-17 PROCEDURE — 86850 RBC ANTIBODY SCREEN: CPT

## 2024-10-17 PROCEDURE — 85045 AUTOMATED RETICULOCYTE COUNT: CPT

## 2024-10-17 PROCEDURE — 80053 COMPREHEN METABOLIC PANEL: CPT

## 2024-10-17 PROCEDURE — 83036 HEMOGLOBIN GLYCOSYLATED A1C: CPT

## 2024-10-17 PROCEDURE — 85025 COMPLETE CBC W/AUTO DIFF WBC: CPT

## 2024-10-17 PROCEDURE — 36415 COLL VENOUS BLD VENIPUNCTURE: CPT

## 2024-10-17 PROCEDURE — 86901 BLOOD TYPING SEROLOGIC RH(D): CPT

## 2024-10-17 PROCEDURE — 82728 ASSAY OF FERRITIN: CPT

## 2024-10-18 ENCOUNTER — TELEPHONE (OUTPATIENT)
Dept: NEPHROLOGY | Facility: CLINIC | Age: 64
End: 2024-10-18

## 2024-10-18 NOTE — TELEPHONE ENCOUNTER
I called and left a detailed VM for Rinku with the following information:    ----- Message from Inna Edward DO sent at 10/18/2024  8:26 AM EDT -----  Please call patient, kidney function stable at 57%. Remainder of labs to be discussed at upcoming appt

## 2024-10-21 ENCOUNTER — ANESTHESIA EVENT (OUTPATIENT)
Dept: PERIOP | Facility: HOSPITAL | Age: 64
DRG: 470 | End: 2024-10-21
Payer: MEDICARE

## 2024-10-27 DIAGNOSIS — Z72.0 TOBACCO USE: ICD-10-CM

## 2024-10-27 DIAGNOSIS — E87.1 HYPONATREMIA: ICD-10-CM

## 2024-10-28 RX ORDER — SODIUM CHLORIDE 1 G/1
1 TABLET ORAL EVERY OTHER DAY
Qty: 45 TABLET | Refills: 1 | Status: SHIPPED | OUTPATIENT
Start: 2024-10-28

## 2024-10-29 RX ORDER — POLYETHYLENE GLYCOL 3350 17 G
2 POWDER IN PACKET (EA) ORAL AS NEEDED
Qty: 81 LOZENGE | Refills: 1 | Status: SHIPPED | OUTPATIENT
Start: 2024-10-29

## 2024-10-29 NOTE — PRE-PROCEDURE INSTRUCTIONS
Pre-Surgery Instructions:   Medication Instructions    ascorbic acid (VITAMIN C) 500 MG tablet Hold day of surgery.    aspirin 81 mg chewable tablet Stop taking 7 days prior to surgery.    atorvastatin (LIPITOR) 40 mg tablet Take night before surgery    esomeprazole (NexIUM) 40 MG capsule Take day of surgery.    fenofibrate (TRICOR) 145 mg tablet Take day of surgery.    ferrous sulfate 324 (65 Fe) mg Hold day of surgery.    folic acid (FOLVITE) 1 mg tablet Hold day of surgery.    Jardiance 10 MG TABS tablet Stop taking 4 days prior to surgery. Last dose 11/14    lisinopril (ZESTRIL) 5 mg tablet Hold day of surgery.    metFORMIN (GLUCOPHAGE) 500 mg tablet Hold day of surgery.    Multiple Vitamins-Minerals (multivitamin with minerals) tablet Hold day of surgery.    nicotine polacrilex (Nicotine Mini) 2 MG lozenge Stop taking 1 day prior to surgery.    sodium chloride 1 g tablet Hold day of surgery.   Medication instructions for day surgery reviewed. Please use only a sip of water to take your instructed medications. Avoid all over the counter vitamins, supplements and NSAIDS for one week prior to surgery per anesthesia guidelines. Tylenol is ok to take as needed.     You will receive a call one business day prior to surgery with an arrival time and hospital directions. If your surgery is scheduled on a Monday, the hospital will be calling you on the Friday prior to your surgery. If you have not heard from anyone by 8pm, please call the hospital supervisor through the hospital  at [phone]. ([place] [phone] or [place] [phone]).    Do not eat or drink anything after midnight the night before your surgery, including candy, mints, lifesavers, or chewing gum. Do not drink alcohol 24hrs before your surgery. Try not to smoke at least 24hrs before your surgery.       Follow the pre surgery showering instructions as listed in the “My Surgical Experience Booklet” or otherwise provided by your surgeon's  office. Do not use a blade to shave the surgical area 1 week before surgery. It is okay to use a clean electric clippers up to 24 hours before surgery. Do not apply any lotions, creams, including makeup, cologne, deodorant, or perfumes after showering on the day of your surgery. Do not use dry shampoo, hair spray, hair gel, or any type of hair products.     No contact lenses, eye make-up, or artificial eyelashes. Remove nail polish, including gel polish, and any artificial, gel, or acrylic nails if possible. Remove all jewelry including rings and body piercing jewelry.     Wear causal clothing that is easy to take on and off. Consider your type of surgery.    Keep any valuables, jewelry, piercings at home. Please bring any specially ordered equipment (sling, braces) if indicated.    Arrange for a responsible person to drive you to and from the hospital on the day of your surgery. Please confirm the visitor policy for the day of your procedure when you receive your phone call with an arrival time.     Call the surgeon's office with any new illnesses, exposures, or additional questions prior to surgery.    Please reference your “My Surgical Experience Booklet” for additional information to prepare for your upcoming surgery.

## 2024-11-03 NOTE — TELEPHONE ENCOUNTER
Call from patient with name of medication  You were discussing this morning. He states it is fenofibrate. Spoke with Dr. Tobias Dao verbally in reference to this medication. OK for him to take this medication. Called and spoke to patient, he is aware its ok to take fenofibrate.
03-Nov-2024 20:56

## 2024-11-04 ENCOUNTER — PREP FOR PROCEDURE (OUTPATIENT)
Dept: OBGYN CLINIC | Facility: CLINIC | Age: 64
End: 2024-11-04

## 2024-11-06 ENCOUNTER — OFFICE VISIT (OUTPATIENT)
Dept: NEPHROLOGY | Facility: CLINIC | Age: 64
End: 2024-11-06
Payer: MEDICARE

## 2024-11-06 VITALS
SYSTOLIC BLOOD PRESSURE: 124 MMHG | WEIGHT: 252 LBS | HEART RATE: 72 BPM | BODY MASS INDEX: 34.13 KG/M2 | DIASTOLIC BLOOD PRESSURE: 78 MMHG | HEIGHT: 72 IN | TEMPERATURE: 97.8 F | OXYGEN SATURATION: 97 %

## 2024-11-06 DIAGNOSIS — R80.9 TYPE 2 DIABETES MELLITUS WITH DIABETIC MICROALBUMINURIA, WITHOUT LONG-TERM CURRENT USE OF INSULIN (HCC): ICD-10-CM

## 2024-11-06 DIAGNOSIS — I10 ESSENTIAL HYPERTENSION: ICD-10-CM

## 2024-11-06 DIAGNOSIS — E66.01 OBESITY, MORBID (HCC): ICD-10-CM

## 2024-11-06 DIAGNOSIS — E87.1 HYPONATREMIA: ICD-10-CM

## 2024-11-06 DIAGNOSIS — N18.31 STAGE 3A CHRONIC KIDNEY DISEASE (HCC): ICD-10-CM

## 2024-11-06 DIAGNOSIS — Z01.818 PREOPERATIVE EXAMINATION: Primary | ICD-10-CM

## 2024-11-06 DIAGNOSIS — E11.29 TYPE 2 DIABETES MELLITUS WITH DIABETIC MICROALBUMINURIA, WITHOUT LONG-TERM CURRENT USE OF INSULIN (HCC): ICD-10-CM

## 2024-11-06 PROCEDURE — 99214 OFFICE O/P EST MOD 30 MIN: CPT | Performed by: INTERNAL MEDICINE

## 2024-11-06 PROCEDURE — G2211 COMPLEX E/M VISIT ADD ON: HCPCS | Performed by: INTERNAL MEDICINE

## 2024-11-06 NOTE — PROGRESS NOTES
Assessment & Plan:    1. Preoperative examination  2. Hyponatremia  -     Basic metabolic panel; Future; Expected date: 11/25/2024  -     Urinalysis with microscopic; Future; Expected date: 05/27/2025  -     Uric acid; Future; Expected date: 05/27/2025  -     Albumin / creatinine urine ratio; Future; Expected date: 05/27/2025  -     Comprehensive metabolic panel; Future; Expected date: 05/27/2025  -     Urinalysis with microscopic; Future; Expected date: 10/07/2025  -     Uric acid; Future; Expected date: 10/07/2025  -     Albumin / creatinine urine ratio; Future; Expected date: 10/07/2025  -     Comprehensive metabolic panel; Future; Expected date: 10/07/2025  -     PTH, intact; Future; Expected date: 10/07/2025  3. Stage 3a chronic kidney disease (HCC)  -     Basic metabolic panel; Future; Expected date: 11/25/2024  -     Urinalysis with microscopic; Future; Expected date: 05/27/2025  -     Uric acid; Future; Expected date: 05/27/2025  -     Albumin / creatinine urine ratio; Future; Expected date: 05/27/2025  -     Comprehensive metabolic panel; Future; Expected date: 05/27/2025  -     Urinalysis with microscopic; Future; Expected date: 10/07/2025  -     Uric acid; Future; Expected date: 10/07/2025  -     Albumin / creatinine urine ratio; Future; Expected date: 10/07/2025  -     Comprehensive metabolic panel; Future; Expected date: 10/07/2025  -     PTH, intact; Future; Expected date: 10/07/2025  4. Obesity, morbid (HCC)  -     Urinalysis with microscopic; Future; Expected date: 05/27/2025  -     Uric acid; Future; Expected date: 05/27/2025  -     Albumin / creatinine urine ratio; Future; Expected date: 05/27/2025  -     Comprehensive metabolic panel; Future; Expected date: 05/27/2025  -     Urinalysis with microscopic; Future; Expected date: 10/07/2025  -     Uric acid; Future; Expected date: 10/07/2025  -     Albumin / creatinine urine ratio; Future; Expected date: 10/07/2025  -     Comprehensive metabolic  panel; Future; Expected date: 10/07/2025  -     PTH, intact; Future; Expected date: 10/07/2025  5. Essential hypertension  -     Urinalysis with microscopic; Future; Expected date: 05/27/2025  -     Uric acid; Future; Expected date: 05/27/2025  -     Albumin / creatinine urine ratio; Future; Expected date: 05/27/2025  -     Comprehensive metabolic panel; Future; Expected date: 05/27/2025  -     Urinalysis with microscopic; Future; Expected date: 10/07/2025  -     Uric acid; Future; Expected date: 10/07/2025  -     Albumin / creatinine urine ratio; Future; Expected date: 10/07/2025  -     Comprehensive metabolic panel; Future; Expected date: 10/07/2025  -     PTH, intact; Future; Expected date: 10/07/2025  6. Type 2 diabetes mellitus with diabetic microalbuminuria, without long-term current use of insulin (HCC)  -     Urinalysis with microscopic; Future; Expected date: 05/27/2025  -     Uric acid; Future; Expected date: 05/27/2025  -     Albumin / creatinine urine ratio; Future; Expected date: 05/27/2025  -     Comprehensive metabolic panel; Future; Expected date: 05/27/2025  -     Urinalysis with microscopic; Future; Expected date: 10/07/2025  -     Uric acid; Future; Expected date: 10/07/2025  -     Albumin / creatinine urine ratio; Future; Expected date: 10/07/2025  -     Comprehensive metabolic panel; Future; Expected date: 10/07/2025  -     PTH, intact; Future; Expected date: 10/07/2025       Preoperative examination, patient optimized from renal perspective from upcoming procedure.  Hold lisinopril 24 hr pre surgery and on the day of surgery.  Hold Jardiance 4 days prior to procedure.  10/17/24, metabolic parameters stable with mild hyponatremia.    Explained risk of EMILY/metabolic derangements with surgery. Discussed medication hold. Advise isotonic IVF rather than Lactated Ringers solution. FU chemistry on the day of surgery and 1 week post op. Consider nephrology consultation during hospitalization.     2.  Hyponatremia, mild, asymptomatic, suspected SIADH/excess fluid intake.   Continue sodium chloride 1 gram QOD.    3. CKD2/ 3a  Reviewed CKD stages/Cr and eGFR trends. Volume status/metabolic parameters stable.  10/17/24 Cr 1.3 with eGFR 57 ml/min. Baseline Cr low 1 range.  CT 5/9/23-- no hydro/calculi/mild perinephric stranding.    Etiology attributed to DKD. Mild albuminuria---100mg/g cr 10/17/24.  BMD parameters WNL.    4. Obesity  Encourage weight loss with dietary modification.    5. Essential HTN  BP well controlled at present, goal<130/80.   Continue lisinopril 5mg/day.    6. Type 2 DM, well controlled.  A1C goal<7. Continue Jardiance 10mg/day. Hold preoperatively.     The benefits, risks and alternatives to the treatment plan were discussed at this visit. Patient was advised of common adverse effects of any medical therapies prescribed. All questions were answered and discussed with the patient and any accompanying family members or caretakers.      Subjective:      Patient ID: Robert Benson is a 64 y.o. male presents for follow up in the [place] office for CKD management.     HPI  Patient going for left knee replacement 11/18. Denies complaints, ROS negative.    Every now and then sciatic acts up, takes tyjlenol.     Denies checking BP at home. Currently on lisinopril 5mg/day. BP well controlled, 124/78.    Tolerating Jardiance well. Dm well controlled, A1C 6.8. Denies UTI/yeast infections.       The following portions of the patient's history were reviewed and updated as appropriate: allergies, current medications, past family history, past medical history, past social history, past surgical history, and problem list.    Review of Systems   Respiratory: Negative.     Cardiovascular: Negative.    Gastrointestinal: Negative.    Genitourinary: Negative.    Neurological: Negative.    All other systems reviewed and are negative.        Objective:      /78 (BP Location: Right arm, Patient Position:  Sitting, Cuff Size: Large)   Pulse 72   Temp 97.8 °F (36.6 °C) (Temporal)   Ht 6' (1.829 m)   Wt 114 kg (252 lb)   SpO2 97%   BMI 34.18 kg/m²          Physical Exam  Vitals reviewed.   Constitutional:       General: He is not in acute distress.     Appearance: He is not ill-appearing.   HENT:      Head: Normocephalic and atraumatic.      Nose: Nose normal.      Mouth/Throat:      Mouth: Mucous membranes are moist.      Pharynx: Oropharynx is clear.   Cardiovascular:      Rate and Rhythm: Normal rate and regular rhythm.      Heart sounds:      No friction rub.   Pulmonary:      Breath sounds: Normal breath sounds. No wheezing, rhonchi or rales.   Abdominal:      Palpations: Abdomen is soft.      Tenderness: There is no abdominal tenderness. There is no guarding.   Musculoskeletal:      Right lower leg: No edema.      Left lower leg: No edema.   Skin:     Coloration: Skin is not jaundiced.      Findings: No bruising.   Neurological:      General: No focal deficit present.      Mental Status: He is alert and oriented to person, place, and time. Mental status is at baseline.      Cranial Nerves: No cranial nerve deficit.   Psychiatric:         Mood and Affect: Mood normal.             Lab Results   Component Value Date    SODIUM 133 (L) 10/17/2024    K 4.2 10/17/2024     10/17/2024    CO2 23 10/17/2024    AGAP 10 10/17/2024    BUN 16 10/17/2024    CREATININE 1.30 10/17/2024    GLUC 98 05/09/2023    GLUF 114 (H) 10/17/2024    CALCIUM 8.9 10/17/2024    AST 23 10/17/2024    ALT 28 10/17/2024    ALKPHOS 49 10/17/2024    TP 7.4 10/17/2024    TBILI 0.60 10/17/2024    EGFR 57 10/17/2024      Lab Results   Component Value Date    CREATININE 1.30 10/17/2024    CREATININE 1.39 (H) 06/11/2024    CREATININE 1.35 (H) 05/02/2024    CREATININE 1.19 11/20/2023    CREATININE 1.54 (H) 05/18/2023    CREATININE 1.51 (H) 05/16/2023    CREATININE 1.02 05/09/2023    CREATININE 0.96 12/12/2019    CREATININE 1.01 12/11/2019     "CREATININE 1.35 05/09/2018    CREATININE 1.18 05/08/2018    CREATININE 1.20 03/26/2018      Lab Results   Component Value Date    COLORU Light Yellow 10/17/2024    CLARITYU Clear 10/17/2024    SPECGRAV 1.012 10/17/2024    PHUR 7.0 10/17/2024    LEUKOCYTESUR (A) 10/17/2024     Elevated glucose may cause decreased leukocyte values. See urine microscopic for UWBC result    NITRITE Negative 10/17/2024    PROTEIN UA Trace (A) 10/17/2024    GLUCOSEU >=1000 (1%) (A) 10/17/2024    KETONESU Negative 10/17/2024    UROBILINOGEN <2.0 10/17/2024    BILIRUBINUR Negative 10/17/2024    BLOODU Negative 10/17/2024    RBCUA None Seen 10/17/2024    WBCUA 1-2 10/17/2024    EPIS None Seen 10/17/2024    BACTERIA None Seen 10/17/2024      No results found for: \"LABPROT\"  No results found for: \"MICROALBUR\", \"[id number]\"  Lab Results   Component Value Date    WBC 8.08 10/17/2024    HGB 12.9 10/17/2024    HCT 40.6 10/17/2024    MCV 89 10/17/2024     10/17/2024      Lab Results   Component Value Date    HGB 12.9 10/17/2024    HGB 11.8 (L) 05/02/2024    HGB 13.2 11/20/2023    HGB 13.7 05/09/2023      Lab Results   Component Value Date    IRON 44 (L) 10/17/2024    TIBC 416 10/17/2024    FERRITIN 27 10/17/2024      No results found for: \"PTHCALCIUM\", \"[id number]\", \"PHOSPHORUS\"   Lab Results   Component Value Date    CHOLESTEROL 161 05/02/2024    HDL 31 (L) 05/02/2024    LDLCALC 105 (H) 05/02/2024    TRIG 123 05/02/2024      Lab Results   Component Value Date    URICACID 4.9 10/17/2024      Lab Results   Component Value Date    HGBA1C 6.8 (H) 10/17/2024      No results found for: \"TSHANTIBODY\", \"S1COQIB\", \"FREET4\"   No results found for: \"TAMMIE\", \"DSDNAAB\", \"RFIGM\"   Lab Results   Component Value Date    UPEP See Comment 05/02/2024        Portions of the record may have been created with voice recognition software. Occasional wrong word or \"sound a like\" substitutions may have occurred due to the inherent limitations of voice recognition " software. Read the chart carefully and recognize, using context, where substitutions have occurred. If you have any questions, please contact the dictating provider.

## 2024-11-06 NOTE — PATIENT INSTRUCTIONS
You are optimized from kidney perspective for upcoming surgery, we discussed mild risk of acute kidney injury and electrolyte disorder in the operative setting. Stop Jardiance 4 days prior to surgery. Hold lisinopril 24 hours prior to surgery and on the day of surgery.   Your sodium level is acceptable at 133.   Recommend checking blood work 1 week post operatively.   Check blood work in 6 months.

## 2024-11-10 ENCOUNTER — TELEPHONE (OUTPATIENT)
Age: 64
End: 2024-11-10

## 2024-11-11 ENCOUNTER — CONSULT (OUTPATIENT)
Dept: FAMILY MEDICINE CLINIC | Facility: CLINIC | Age: 64
End: 2024-11-11
Payer: MEDICARE

## 2024-11-11 VITALS
HEIGHT: 70 IN | BODY MASS INDEX: 36.22 KG/M2 | HEART RATE: 73 BPM | WEIGHT: 253 LBS | OXYGEN SATURATION: 96 % | DIASTOLIC BLOOD PRESSURE: 76 MMHG | SYSTOLIC BLOOD PRESSURE: 128 MMHG

## 2024-11-11 DIAGNOSIS — Z01.818 PRE-OP EXAMINATION: Primary | ICD-10-CM

## 2024-11-11 PROCEDURE — G2211 COMPLEX E/M VISIT ADD ON: HCPCS | Performed by: INTERNAL MEDICINE

## 2024-11-11 PROCEDURE — 99213 OFFICE O/P EST LOW 20 MIN: CPT | Performed by: INTERNAL MEDICINE

## 2024-11-11 NOTE — PATIENT INSTRUCTIONS
Pre-operative Medication Instructions    Avoid herbs or non-directed vitamins one week prior to surgery  Avoid aspirin containing medications or non-steroidal anti-inflammatory drugs one week preceding surgery  May take tylenol for pain up until the night before surgery    ACE Inhibitors or ARBs       Medication Name     lisinopril (ZESTRIL) 5 mg tablet        Hold this medicine the day before surgery    Cholesterol lowering meds       Medication Name     atorvastatin (LIPITOR) 40 mg tablet     fenofibrate (TRICOR) 145 mg tablet        Continue to take this medication on your normal schedule.  If this is an oral medication and you take it in the morning, then you may take this medicine with a sip of water.    Diabetic Medications       Medication Name     Jardiance 10 MG TABS tablet     metFORMIN (GLUCOPHAGE) 500 mg tablet          Medicine Instructions for Adults with Diabetes (NO Bowel Prep)

## 2024-11-11 NOTE — PROGRESS NOTES
Ambulatory Visit  Name: Robert Benson      : 1960      MRN: [id number]  Encounter Provider: Srinivas Esquivel MD  Encounter Date: 2024   Encounter department: [facility]    Assessment & Plan  1. Preoperative assessment.  He is scheduled for a left knee replacement surgery next Monday. His EKG results were consistent with previous findings, and he received cardiology clearance from Dr. Arndt. He is at fixed, low risk for cardiopulmonary complications from knee replacement surgery. He should proceed with surgery as scheduled. He was advised to discontinue Jardiance 10 mg four days prior to the surgery, lisinopril 5 mg the day before the surgery, and aspirin a week before the surgery. He was instructed to take atorvastatin the night before the surgery. On the day of surgery, he should take omeprazole and fenofibrate.            History of Present Illness     History of Present Illness  The patient is a 64-year-old male who presents for a preoperative assessment. He is accompanied by his wife.    He is scheduled for a left knee replacement next Monday. He has previously undergone a right knee replacement 10 years ago and a Heller myotomy, with no complications from anesthesia or bleeding. His physical activity is limited due to pain, allowing him to walk only a quarter mile before needing to rest. He reports no chest pressure or severe shortness of breath. His surgery will be performed by Dr. Rodriguez at the [facility]. He has received cardiology clearance from Dr. Arndt and nephrology clearance from Dr. Castro. He has been advised to discontinue Jardiance 4 days prior to surgery, lisinopril the day before surgery, and aspirin a week before surgery. He will continue to take omeprazole and fenofibrate on the day of surgery. He will be admitted to the hospital overnight and discharged the following day. Outpatient rehabilitation is planned post-surgery.    His current medication  "regimen includes amoxicillin for dental work, vitamin C, baby aspirin, atorvastatin, Nexium, TriCor, iron, folate, Jardiance 10 mg daily, lisinopril 5 mg daily, metformin 500 mg daily, and sodium chloride tablets every other day. He has discontinued the use of Lidoderm patch and methocarbamol.       Objective     /76 (BP Location: Left arm, Patient Position: Sitting, Cuff Size: Large)   Pulse 73   Ht 5' 10.25\" (1.784 m)   Wt 115 kg (253 lb)   SpO2 96%   BMI 36.04 kg/m²     Physical Exam    Physical Exam  Constitutional:       General: He is not in acute distress.     Appearance: Normal appearance. He is well-developed. He is not ill-appearing.   Neck:      Vascular: No JVD.   Cardiovascular:      Rate and Rhythm: Normal rate and regular rhythm.      Heart sounds: Normal heart sounds. No murmur heard.     No friction rub. No gallop.   Pulmonary:      Breath sounds: Normal breath sounds.   Abdominal:      General: Bowel sounds are normal. There is no distension.      Palpations: Abdomen is soft. There is no mass.   Musculoskeletal:         General: No swelling.      Comments: Left knee extension was limited to about 180 degrees there is some valgus and varus laxity on the left.   Neurological:      Mental Status: He is alert.         "

## 2024-11-11 NOTE — TELEPHONE ENCOUNTER
Please contact orthopedic Dr Rodriguez office and find out if he needs renal clearance for upcoming surgery. He is optimized from our perspective and I outlined this in most recent note 11/6.

## 2024-11-12 ENCOUNTER — APPOINTMENT (OUTPATIENT)
Dept: PHYSICAL THERAPY | Facility: CLINIC | Age: 64
End: 2024-11-12
Payer: MEDICARE

## 2024-11-14 ENCOUNTER — EVALUATION (OUTPATIENT)
Dept: PHYSICAL THERAPY | Facility: CLINIC | Age: 64
End: 2024-11-14
Payer: MEDICARE

## 2024-11-14 VITALS — SYSTOLIC BLOOD PRESSURE: 143 MMHG | HEART RATE: 66 BPM | DIASTOLIC BLOOD PRESSURE: 81 MMHG

## 2024-11-14 DIAGNOSIS — M17.12 PRIMARY OSTEOARTHRITIS OF LEFT KNEE: ICD-10-CM

## 2024-11-14 PROCEDURE — 97110 THERAPEUTIC EXERCISES: CPT | Performed by: PHYSICAL THERAPIST

## 2024-11-14 PROCEDURE — 97162 PT EVAL MOD COMPLEX 30 MIN: CPT | Performed by: PHYSICAL THERAPIST

## 2024-11-14 NOTE — PROGRESS NOTES
PT Evaluation     Today's date: 2024  Patient name: Robert Benson  : 1960  MRN: [id number]  Referring provider: Drake Vasquez,*  Dx:   Encounter Diagnosis     ICD-10-CM    1. Primary osteoarthritis of left knee  M17.12 Ambulatory referral to Physical Therapy                     Assessment  Impairments: abnormal or restricted ROM, abnormal movement, activity intolerance, impaired balance, impaired physical strength, lacks appropriate home exercise program, pain with function, weight-bearing intolerance, participation limitations and activity limitations  Functional limitations: walking limited to 1/4 mile or less, limited tolerance to prolonged sitting/driving, pain/limitation with squatting/stooping    Assessment details: Robert Benson is a 64 y.o. male who presents with complaints L knee pain. Scheduled for TKA on 24 with Dr. Rodriguez. Patient  presents today for pre-op evaluation. Patient presents with pain, limited ROM, limited strength and limited function as described. Performed virtual home assessment with patient, reviewed home checklist. Reviewed HEP with patient to begin as soon as he returns home from hospital. Patient will be re-evaluated in PT following his surgery next week. Prognosis is good given HEP compliance and PT 2x/wk.  Please contact me if you have any questions or recommendations.  Thank you for the opportunity to share in  Robert's care.     Understanding of Dx/Px/POC: good     Prognosis: good    Goals  STGs  1) In 1 visit patient will be independent with HEP    STGs and LTGs to be updated at post-op re-evaluation    Plan  Patient would benefit from: skilled physical therapy  Referral necessary: No  Planned modality interventions: cryotherapy, TENS and thermotherapy: hydrocollator packs    Planned therapy interventions: IASTM, joint mobilization, kinesiology taping, manual therapy, massage, Sabillon taping, abdominal trunk stabilization, balance, balance/weight  bearing training, neuromuscular re-education, postural training, self care, strengthening, stretching, therapeutic activities, gait training, functional ROM exercises, flexibility and home exercise program    Frequency: 2x week  Duration in weeks: 12  Plan of Care beginning date: 2024  Plan of Care expiration date: 2025  Treatment plan discussed with: patient  Plan details: Patient is scheduled for re-evaluation upon return post-operatively        Subjective Evaluation    History of Present Illness  Mechanism of injury: -patient is scheduled for L TKA on  with Dr. Rodriguez  -current LOF: hurts to walk, walking limited to about 1/4 mile, limited tolerance to prolonged sitting/driving, no limitation on stairs, limited and painful crouching/stooping  -lives at home with his spouse  -lives in 2 story home, FF of stairs with full bath upstairs/half bath downstairs  -walk in shower  -owns a walker  -patient's goal is to return directly home from the hospital after surgery  Patient Goals  Patient goals for therapy: return to sport/leisure activities, increased strength, decreased pain, increased motion, improved balance, independence with ADLs/IADLs and decreased edema  Patient goal: walk for exercise (3 miles on level track) without limitation, work on cars without limitatoin  Pain  Current pain ratin  At best pain ratin  At worst pain ratin    Social Support  Steps to enter house: yes  2  Stairs in house: yes   Lives in: multiple-level home  Lives with: spouse    Employment status: not working  Treatments  No previous or current treatments    Objective     Active Range of Motion   Left Knee   Flexion: 132 degrees   Extension: 0 degrees     Strength/Myotome Testing     Left Hip   Planes of Motion   Abduction: 3+    Left Knee   Flexion: 5  Extension: 4+  Quadriceps contraction: good    Ambulation     Ambulation: Stairs   Ascend stairs: independent  Pattern: non-reciprocal  Railings: without  rails  Descend stairs: independent  Pattern: non-reciprocal  Railings: without rails    Functional Assessment        Comments  TU seconds, no AD  JR LEXIE. Score: 8 / 28, Interval Score: 65.994 / 100  RAPT score 12: plan to DC home from hospital              Precautions: none  CO-MORBIDITIES: HTN, h/o R TKA, diabetes, COPD, heart murmur  HEP ACCESS CODE: n/a (pre-op handout)  FOTO Completed On: 24    POC Expires Reeval for Medicare to be completed Unit LImit Auth Expiration Date PT/OT/STVisit Limit   25 By visit  10 N/a N/a N/a    Completed on                  TREATMENT DIARY  Auth Status DATE         NA Visit # 1         Remaining N/a        MANUAL THERAPY         L knee stretching prn                                                      THERAPEUTIC EXERCISE HEP         Nustep for ROM and endurance          Ankle pumps x         Glute sets x         Quad sets x         Heel slides x         Supine hip abduction x         LAQ x         HR/TR                                                                                          NEUROMUSCULAR REEDUCATION                                                                                                                                                       THERAPEUTIC ACTIVITY          Stair training 4 steps up/down with railing  X5 mins                                       GAIT TRAINING          Level surface                                        MODALITIES         CP post tx prn

## 2024-11-15 PROBLEM — M17.12 PRIMARY OSTEOARTHRITIS OF LEFT KNEE: Status: ACTIVE | Noted: 2024-11-15

## 2024-11-18 ENCOUNTER — APPOINTMENT (OUTPATIENT)
Dept: RADIOLOGY | Facility: HOSPITAL | Age: 64
DRG: 470 | End: 2024-11-18
Payer: MEDICARE

## 2024-11-18 ENCOUNTER — HOSPITAL ENCOUNTER (INPATIENT)
Facility: HOSPITAL | Age: 64
LOS: 1 days | Discharge: HOME | DRG: 470 | End: 2024-11-20
Attending: ORTHOPAEDIC SURGERY | Admitting: ORTHOPAEDIC SURGERY
Payer: MEDICARE

## 2024-11-18 ENCOUNTER — ANESTHESIA (OUTPATIENT)
Dept: PERIOP | Facility: HOSPITAL | Age: 64
DRG: 470 | End: 2024-11-18
Payer: MEDICARE

## 2024-11-18 DIAGNOSIS — E87.1 HYPONATREMIA: ICD-10-CM

## 2024-11-18 DIAGNOSIS — M17.12 PRIMARY OSTEOARTHRITIS OF LEFT KNEE: Primary | ICD-10-CM

## 2024-11-18 LAB
ABO GROUP BLD: NORMAL
BLD GP AB SCN SERPL QL: NEGATIVE
GLUCOSE SERPL-MCNC: 120 MG/DL (ref 65–140)
GLUCOSE SERPL-MCNC: 200 MG/DL (ref 65–140)
GLUCOSE SERPL-MCNC: 246 MG/DL (ref 65–140)
RH BLD: NEGATIVE
SPECIMEN EXPIRATION DATE: NORMAL

## 2024-11-18 PROCEDURE — 97166 OT EVAL MOD COMPLEX 45 MIN: CPT

## 2024-11-18 PROCEDURE — 27447 TOTAL KNEE ARTHROPLASTY: CPT | Performed by: PHYSICIAN ASSISTANT

## 2024-11-18 PROCEDURE — C1713 ANCHOR/SCREW BN/BN,TIS/BN: HCPCS | Performed by: ORTHOPAEDIC SURGERY

## 2024-11-18 PROCEDURE — 86901 BLOOD TYPING SEROLOGIC RH(D): CPT | Performed by: ORTHOPAEDIC SURGERY

## 2024-11-18 PROCEDURE — 0SRD0J9 REPLACEMENT OF LEFT KNEE JOINT WITH SYNTHETIC SUBSTITUTE, CEMENTED, OPEN APPROACH: ICD-10-PCS | Performed by: ORTHOPAEDIC SURGERY

## 2024-11-18 PROCEDURE — 88305 TISSUE EXAM BY PATHOLOGIST: CPT | Performed by: PATHOLOGY

## 2024-11-18 PROCEDURE — 82948 REAGENT STRIP/BLOOD GLUCOSE: CPT

## 2024-11-18 PROCEDURE — C9290 INJ, BUPIVACAINE LIPOSOME: HCPCS | Performed by: ANESTHESIOLOGY

## 2024-11-18 PROCEDURE — 86850 RBC ANTIBODY SCREEN: CPT | Performed by: ORTHOPAEDIC SURGERY

## 2024-11-18 PROCEDURE — 86900 BLOOD TYPING SEROLOGIC ABO: CPT | Performed by: ORTHOPAEDIC SURGERY

## 2024-11-18 PROCEDURE — 27447 TOTAL KNEE ARTHROPLASTY: CPT | Performed by: ORTHOPAEDIC SURGERY

## 2024-11-18 PROCEDURE — C1776 JOINT DEVICE (IMPLANTABLE): HCPCS | Performed by: ORTHOPAEDIC SURGERY

## 2024-11-18 PROCEDURE — 88311 DECALCIFY TISSUE: CPT | Performed by: PATHOLOGY

## 2024-11-18 PROCEDURE — NC001 PR NO CHARGE: Performed by: ORTHOPAEDIC SURGERY

## 2024-11-18 PROCEDURE — 73560 X-RAY EXAM OF KNEE 1 OR 2: CPT

## 2024-11-18 PROCEDURE — 97163 PT EVAL HIGH COMPLEX 45 MIN: CPT

## 2024-11-18 DEVICE — IMPLANTABLE DEVICE: Type: IMPLANTABLE DEVICE | Site: KNEE | Status: FUNCTIONAL

## 2024-11-18 DEVICE — IMPLANTABLE DEVICE
Type: IMPLANTABLE DEVICE | Site: KNEE | Status: FUNCTIONAL
Brand: NEXGEN®

## 2024-11-18 DEVICE — IMPLANTABLE DEVICE
Type: IMPLANTABLE DEVICE | Site: KNEE | Status: FUNCTIONAL
Brand: NEXGEN® LEGACY®

## 2024-11-18 RX ORDER — ATORVASTATIN CALCIUM 40 MG/1
40 TABLET, FILM COATED ORAL EVERY EVENING
Status: DISCONTINUED | OUTPATIENT
Start: 2024-11-18 | End: 2024-11-20 | Stop reason: HOSPADM

## 2024-11-18 RX ORDER — OXYCODONE HYDROCHLORIDE 5 MG/1
5 TABLET ORAL EVERY 4 HOURS PRN
Status: DISCONTINUED | OUTPATIENT
Start: 2024-11-18 | End: 2024-11-20 | Stop reason: HOSPADM

## 2024-11-18 RX ORDER — MIDAZOLAM HYDROCHLORIDE 2 MG/2ML
INJECTION, SOLUTION INTRAMUSCULAR; INTRAVENOUS
Status: DISCONTINUED
Start: 2024-11-18 | End: 2024-11-18 | Stop reason: WASHOUT

## 2024-11-18 RX ORDER — FONDAPARINUX SODIUM 2.5 MG/.5ML
2.5 INJECTION SUBCUTANEOUS EVERY 24 HOURS
Status: DISCONTINUED | OUTPATIENT
Start: 2024-11-18 | End: 2024-11-20 | Stop reason: HOSPADM

## 2024-11-18 RX ORDER — INSULIN LISPRO 100 [IU]/ML
2-12 INJECTION, SOLUTION INTRAVENOUS; SUBCUTANEOUS
Status: DISCONTINUED | OUTPATIENT
Start: 2024-11-18 | End: 2024-11-20 | Stop reason: HOSPADM

## 2024-11-18 RX ORDER — CHLORHEXIDINE GLUCONATE 40 MG/ML
SOLUTION TOPICAL DAILY PRN
Status: DISCONTINUED | OUTPATIENT
Start: 2024-11-18 | End: 2024-11-18 | Stop reason: HOSPADM

## 2024-11-18 RX ORDER — DOCUSATE SODIUM 100 MG/1
100 CAPSULE, LIQUID FILLED ORAL 2 TIMES DAILY
Status: DISCONTINUED | OUTPATIENT
Start: 2024-11-18 | End: 2024-11-20 | Stop reason: HOSPADM

## 2024-11-18 RX ORDER — BUPIVACAINE HYDROCHLORIDE AND EPINEPHRINE 2.5; 5 MG/ML; UG/ML
INJECTION, SOLUTION EPIDURAL; INFILTRATION; INTRACAUDAL; PERINEURAL AS NEEDED
Status: DISCONTINUED | OUTPATIENT
Start: 2024-11-18 | End: 2024-11-18

## 2024-11-18 RX ORDER — OXYCODONE HYDROCHLORIDE 10 MG/1
10 TABLET ORAL EVERY 6 HOURS PRN
Status: DISCONTINUED | OUTPATIENT
Start: 2024-11-18 | End: 2024-11-20 | Stop reason: HOSPADM

## 2024-11-18 RX ORDER — MAGNESIUM HYDROXIDE/ALUMINUM HYDROXICE/SIMETHICONE 120; 1200; 1200 MG/30ML; MG/30ML; MG/30ML
30 SUSPENSION ORAL EVERY 6 HOURS PRN
Status: DISCONTINUED | OUTPATIENT
Start: 2024-11-18 | End: 2024-11-20 | Stop reason: HOSPADM

## 2024-11-18 RX ORDER — ONDANSETRON 2 MG/ML
INJECTION INTRAMUSCULAR; INTRAVENOUS AS NEEDED
Status: DISCONTINUED | OUTPATIENT
Start: 2024-11-18 | End: 2024-11-18

## 2024-11-18 RX ORDER — CEFAZOLIN SODIUM 2 G/50ML
2000 SOLUTION INTRAVENOUS ONCE
Status: COMPLETED | OUTPATIENT
Start: 2024-11-18 | End: 2024-11-18

## 2024-11-18 RX ORDER — FENOFIBRATE 145 MG/1
145 TABLET, COATED ORAL DAILY
Status: DISCONTINUED | OUTPATIENT
Start: 2024-11-18 | End: 2024-11-20 | Stop reason: HOSPADM

## 2024-11-18 RX ORDER — HYDROMORPHONE HCL/PF 1 MG/ML
0.5 SYRINGE (ML) INJECTION
Status: DISCONTINUED | OUTPATIENT
Start: 2024-11-18 | End: 2024-11-20 | Stop reason: HOSPADM

## 2024-11-18 RX ORDER — TRANEXAMIC ACID 10 MG/ML
INJECTION, SOLUTION INTRAVENOUS AS NEEDED
Status: DISCONTINUED | OUTPATIENT
Start: 2024-11-18 | End: 2024-11-18

## 2024-11-18 RX ORDER — BUPIVACAINE HYDROCHLORIDE 7.5 MG/ML
INJECTION, SOLUTION INTRASPINAL AS NEEDED
Status: DISCONTINUED | OUTPATIENT
Start: 2024-11-18 | End: 2024-11-18

## 2024-11-18 RX ORDER — FONDAPARINUX SODIUM 2.5 MG/.5ML
2.5 INJECTION SUBCUTANEOUS DAILY
Status: DISCONTINUED | OUTPATIENT
Start: 2024-11-18 | End: 2024-11-18

## 2024-11-18 RX ORDER — ACETAMINOPHEN 325 MG/1
650 TABLET ORAL EVERY 4 HOURS PRN
Status: DISCONTINUED | OUTPATIENT
Start: 2024-11-18 | End: 2024-11-20 | Stop reason: HOSPADM

## 2024-11-18 RX ORDER — ONDANSETRON 2 MG/ML
4 INJECTION INTRAMUSCULAR; INTRAVENOUS EVERY 6 HOURS PRN
Status: DISCONTINUED | OUTPATIENT
Start: 2024-11-18 | End: 2024-11-20 | Stop reason: HOSPADM

## 2024-11-18 RX ORDER — MIDAZOLAM HYDROCHLORIDE 2 MG/2ML
INJECTION, SOLUTION INTRAMUSCULAR; INTRAVENOUS
Status: COMPLETED | OUTPATIENT
Start: 2024-11-18 | End: 2024-11-18

## 2024-11-18 RX ORDER — CEFAZOLIN SODIUM 1 G/50ML
1000 SOLUTION INTRAVENOUS EVERY 8 HOURS
Status: COMPLETED | OUTPATIENT
Start: 2024-11-18 | End: 2024-11-19

## 2024-11-18 RX ORDER — FENTANYL CITRATE 50 UG/ML
INJECTION, SOLUTION INTRAMUSCULAR; INTRAVENOUS
Status: DISCONTINUED
Start: 2024-11-18 | End: 2024-11-18 | Stop reason: WASHOUT

## 2024-11-18 RX ORDER — FENTANYL CITRATE/PF 50 MCG/ML
25 SYRINGE (ML) INJECTION
Status: DISCONTINUED | OUTPATIENT
Start: 2024-11-18 | End: 2024-11-18 | Stop reason: HOSPADM

## 2024-11-18 RX ORDER — SODIUM CHLORIDE, SODIUM LACTATE, POTASSIUM CHLORIDE, CALCIUM CHLORIDE 600; 310; 30; 20 MG/100ML; MG/100ML; MG/100ML; MG/100ML
INJECTION, SOLUTION INTRAVENOUS CONTINUOUS PRN
Status: DISCONTINUED | OUTPATIENT
Start: 2024-11-18 | End: 2024-11-18

## 2024-11-18 RX ORDER — FERROUS SULFATE 325(65) MG
325 TABLET ORAL 2 TIMES DAILY WITH MEALS
Status: DISCONTINUED | OUTPATIENT
Start: 2024-11-18 | End: 2024-11-20 | Stop reason: HOSPADM

## 2024-11-18 RX ORDER — FENTANYL CITRATE 50 UG/ML
INJECTION, SOLUTION INTRAMUSCULAR; INTRAVENOUS
Status: COMPLETED | OUTPATIENT
Start: 2024-11-18 | End: 2024-11-18

## 2024-11-18 RX ORDER — GINSENG 100 MG
CAPSULE ORAL AS NEEDED
Status: DISCONTINUED | OUTPATIENT
Start: 2024-11-18 | End: 2024-11-18 | Stop reason: HOSPADM

## 2024-11-18 RX ORDER — FOLIC ACID 1 MG/1
1 TABLET ORAL DAILY
Status: DISCONTINUED | OUTPATIENT
Start: 2024-11-18 | End: 2024-11-20 | Stop reason: HOSPADM

## 2024-11-18 RX ORDER — ASCORBIC ACID 500 MG
500 TABLET ORAL 2 TIMES DAILY
Status: DISCONTINUED | OUTPATIENT
Start: 2024-11-18 | End: 2024-11-20 | Stop reason: HOSPADM

## 2024-11-18 RX ORDER — SODIUM CHLORIDE 1 G/1
1 TABLET ORAL EVERY OTHER DAY
Status: DISCONTINUED | OUTPATIENT
Start: 2024-11-18 | End: 2024-11-19

## 2024-11-18 RX ORDER — PROPOFOL 10 MG/ML
INJECTION, EMULSION INTRAVENOUS CONTINUOUS PRN
Status: DISCONTINUED | OUTPATIENT
Start: 2024-11-18 | End: 2024-11-18

## 2024-11-18 RX ORDER — ONDANSETRON 2 MG/ML
4 INJECTION INTRAMUSCULAR; INTRAVENOUS ONCE AS NEEDED
Status: DISCONTINUED | OUTPATIENT
Start: 2024-11-18 | End: 2024-11-18 | Stop reason: HOSPADM

## 2024-11-18 RX ORDER — SODIUM CHLORIDE, SODIUM LACTATE, POTASSIUM CHLORIDE, CALCIUM CHLORIDE 600; 310; 30; 20 MG/100ML; MG/100ML; MG/100ML; MG/100ML
100 INJECTION, SOLUTION INTRAVENOUS CONTINUOUS
Status: ACTIVE | OUTPATIENT
Start: 2024-11-18 | End: 2024-11-18

## 2024-11-18 RX ORDER — CHLORHEXIDINE GLUCONATE ORAL RINSE 1.2 MG/ML
15 SOLUTION DENTAL ONCE
Status: COMPLETED | OUTPATIENT
Start: 2024-11-18 | End: 2024-11-18

## 2024-11-18 RX ORDER — DEXAMETHASONE SODIUM PHOSPHATE 10 MG/ML
INJECTION, SOLUTION INTRAMUSCULAR; INTRAVENOUS AS NEEDED
Status: DISCONTINUED | OUTPATIENT
Start: 2024-11-18 | End: 2024-11-18

## 2024-11-18 RX ORDER — GLYCOPYRROLATE 0.2 MG/ML
INJECTION INTRAMUSCULAR; INTRAVENOUS AS NEEDED
Status: DISCONTINUED | OUTPATIENT
Start: 2024-11-18 | End: 2024-11-18

## 2024-11-18 RX ORDER — LISINOPRIL 5 MG/1
5 TABLET ORAL DAILY
Status: DISCONTINUED | OUTPATIENT
Start: 2024-11-18 | End: 2024-11-20 | Stop reason: HOSPADM

## 2024-11-18 RX ORDER — BUPIVACAINE HYDROCHLORIDE 5 MG/ML
INJECTION, SOLUTION EPIDURAL; INTRACAUDAL
Status: COMPLETED | OUTPATIENT
Start: 2024-11-18 | End: 2024-11-18

## 2024-11-18 RX ADMIN — CEFAZOLIN SODIUM 1000 MG: 1 SOLUTION INTRAVENOUS at 23:52

## 2024-11-18 RX ADMIN — PROPOFOL 100 MCG/KG/MIN: 10 INJECTION, EMULSION INTRAVENOUS at 07:36

## 2024-11-18 RX ADMIN — BUPIVACAINE HYDROCHLORIDE AND EPINEPHRINE BITARTRATE 1.8 ML: 2.5; .0091 INJECTION, SOLUTION EPIDURAL; INFILTRATION; INTRACAUDAL; PERINEURAL at 07:39

## 2024-11-18 RX ADMIN — FERROUS SULFATE TAB 325 MG (65 MG ELEMENTAL FE) 325 MG: 325 (65 FE) TAB at 17:38

## 2024-11-18 RX ADMIN — MIDAZOLAM 2 MG: 1 INJECTION INTRAMUSCULAR; INTRAVENOUS at 07:26

## 2024-11-18 RX ADMIN — SODIUM CHLORIDE 1 G: 1 TABLET ORAL at 15:03

## 2024-11-18 RX ADMIN — CHLORHEXIDINE GLUCONATE 15 ML: 1.2 RINSE ORAL at 07:18

## 2024-11-18 RX ADMIN — OXYCODONE HYDROCHLORIDE 5 MG: 5 TABLET ORAL at 12:47

## 2024-11-18 RX ADMIN — ATORVASTATIN CALCIUM 40 MG: 40 TABLET, FILM COATED ORAL at 17:38

## 2024-11-18 RX ADMIN — PROPOFOL 30 MG: 10 INJECTION, EMULSION INTRAVENOUS at 07:37

## 2024-11-18 RX ADMIN — HYDROMORPHONE HYDROCHLORIDE 0.5 MG: 1 INJECTION, SOLUTION INTRAMUSCULAR; INTRAVENOUS; SUBCUTANEOUS at 22:23

## 2024-11-18 RX ADMIN — Medication 1 TABLET: at 15:02

## 2024-11-18 RX ADMIN — BUPIVACAINE 10 ML: 13.3 INJECTION, SUSPENSION, LIPOSOMAL INFILTRATION at 07:26

## 2024-11-18 RX ADMIN — TRANEXAMIC ACID 1000 MG: 10 INJECTION, SOLUTION INTRAVENOUS at 07:33

## 2024-11-18 RX ADMIN — FOLIC ACID 1 MG: 1 TABLET ORAL at 15:02

## 2024-11-18 RX ADMIN — FENOFIBRATE 145 MG: 145 TABLET, FILM COATED ORAL at 15:03

## 2024-11-18 RX ADMIN — SODIUM CHLORIDE, SODIUM LACTATE, POTASSIUM CHLORIDE, AND CALCIUM CHLORIDE: .6; .31; .03; .02 INJECTION, SOLUTION INTRAVENOUS at 06:54

## 2024-11-18 RX ADMIN — DEXAMETHASONE SODIUM PHOSPHATE 8 MG: 10 INJECTION, SOLUTION INTRAMUSCULAR; INTRAVENOUS at 08:15

## 2024-11-18 RX ADMIN — CEFAZOLIN SODIUM 1000 MG: 1 SOLUTION INTRAVENOUS at 15:51

## 2024-11-18 RX ADMIN — HYDROMORPHONE HYDROCHLORIDE 0.5 MG: 1 INJECTION, SOLUTION INTRAMUSCULAR; INTRAVENOUS; SUBCUTANEOUS at 17:44

## 2024-11-18 RX ADMIN — BUPIVACAINE HYDROCHLORIDE IN DEXTROSE 2 ML: 7.5 INJECTION, SOLUTION SUBARACHNOID at 07:35

## 2024-11-18 RX ADMIN — LISINOPRIL 5 MG: 5 TABLET ORAL at 15:04

## 2024-11-18 RX ADMIN — INSULIN LISPRO 4 UNITS: 100 INJECTION, SOLUTION INTRAVENOUS; SUBCUTANEOUS at 22:21

## 2024-11-18 RX ADMIN — BUPIVACAINE HYDROCHLORIDE 10 ML: 5 INJECTION, SOLUTION EPIDURAL; INTRACAUDAL; PERINEURAL at 07:26

## 2024-11-18 RX ADMIN — PHENYLEPHRINE HYDROCHLORIDE 30 MCG/MIN: 10 INJECTION INTRAVENOUS at 07:44

## 2024-11-18 RX ADMIN — CEFAZOLIN SODIUM 2000 MG: 2 SOLUTION INTRAVENOUS at 07:29

## 2024-11-18 RX ADMIN — INSULIN LISPRO 4 UNITS: 100 INJECTION, SOLUTION INTRAVENOUS; SUBCUTANEOUS at 17:46

## 2024-11-18 RX ADMIN — FENTANYL CITRATE 100 MCG: 50 INJECTION, SOLUTION INTRAMUSCULAR; INTRAVENOUS at 07:26

## 2024-11-18 RX ADMIN — OXYCODONE HYDROCHLORIDE 10 MG: 10 TABLET ORAL at 19:08

## 2024-11-18 RX ADMIN — DOCUSATE SODIUM 100 MG: 100 CAPSULE, LIQUID FILLED ORAL at 15:03

## 2024-11-18 RX ADMIN — HYDROMORPHONE HYDROCHLORIDE 0.5 MG: 1 INJECTION, SOLUTION INTRAMUSCULAR; INTRAVENOUS; SUBCUTANEOUS at 15:03

## 2024-11-18 RX ADMIN — SODIUM CHLORIDE, SODIUM LACTATE, POTASSIUM CHLORIDE, AND CALCIUM CHLORIDE: .6; .31; .03; .02 INJECTION, SOLUTION INTRAVENOUS at 08:45

## 2024-11-18 RX ADMIN — GLYCOPYRROLATE 0.2 MG: 0.2 INJECTION, SOLUTION INTRAMUSCULAR; INTRAVENOUS at 07:47

## 2024-11-18 RX ADMIN — OXYCODONE HYDROCHLORIDE AND ACETAMINOPHEN 500 MG: 500 TABLET ORAL at 15:18

## 2024-11-18 RX ADMIN — FONDAPARINUX SODIUM 2.5 MG: 2.5 INJECTION SUBCUTANEOUS at 19:08

## 2024-11-18 RX ADMIN — ONDANSETRON 4 MG: 2 INJECTION INTRAMUSCULAR; INTRAVENOUS at 08:58

## 2024-11-18 NOTE — PHYSICAL THERAPY NOTE
PHYSICAL THERAPY EVALUATION  NAME:  Robert Benson  DATE: 11/18/24    AGE:   64 y.o.  Mrn:   [id number]  ADMIT DX:  Primary osteoarthritis of left knee [M17.12]  Problem List:   Patient Active Problem List   Diagnosis    Achalasia    BRCA2 negative    Diabetes mellitus, type II (HCC)    Cholelithiasis    Nausea    Hyponatremia    Paroxysmal A-fib (HCC)    Diabetes mellitus due to underlying condition with diabetic neuropathy, without long-term current use of insulin (HCC)    Obesity (BMI 30.0-34.9)    Essential hypertension    Family history of chronic ischemic heart disease    Dyslipidemia    Chronic kidney disease (CKD), stage II (mild)    Microalbuminuria    Chronic bronchitis, unspecified chronic bronchitis type (HCC)    Stage 3a chronic kidney disease (HCC)    Pre-operative cardiovascular examination    Obesity, morbid (HCC)    Primary osteoarthritis of left knee    PONV (postoperative nausea and vomiting)    COPD (chronic obstructive pulmonary disease) (Formerly [facility])    CAD (coronary artery disease)    Sleep apnea       Past Medical History  Past Medical History:   Diagnosis Date    Bilateral flank pain 05/11/2023    CAD (coronary artery disease)     nonobstructive RCA via CT 2013    CKD (chronic kidney disease)     Colon polyp     COPD (chronic obstructive pulmonary disease) (HCC)     CPAP (continuous positive airway pressure) dependence     Diabetes mellitus (HCC)     GERD (gastroesophageal reflux disease)     Heart murmur     Hyperlipidemia     Hypertension     PAF (paroxysmal atrial fibrillation) (Formerly [facility])     PONV (postoperative nausea and vomiting)     Sleep apnea     cpap    Wears hearing aid        Past Surgical History  Past Surgical History:   Procedure Laterality Date    APPENDECTOMY N/A     COLONOSCOPY      JOINT REPLACEMENT Right 2014    Knee    MYOTOMY HELLER LAPAROSCOPIC  2018    [place]    STOMACH SURGERY         Length Of Stay: 0  Performed at least 2 patient identifiers during session: Name and ID  bracelet       11/18/24 1429   PT Last Visit   PT Visit Date 11/18/24   Note Type   Note type Evaluation   Pain Assessment   Pain Assessment Tool 0-10   Pain Score 4   Pain Location/Orientation Orientation: Left;Location: Knee   Hospital Pain Intervention(s) Medication (See MAR);Cold applied;Repositioned   Restrictions/Precautions   Weight Bearing Precautions Per Order Yes   LLE Weight Bearing Per Order FWB   Other Precautions Fall Risk;Pain;Chair Alarm;Bed Alarm;WBS;O2;Multiple lines  (hemevac)   Home Living   Type of Home House   Home Layout Two level;Stairs to enter without rails;1/2 bath on main level;Bed/bath upstairs  (2 LAWRENCE; FOS to 2nd floor)   Bathroom Shower/Tub Walk-in shower   Bathroom Toilet Standard   Bathroom Equipment Hand-held shower   Home Equipment Walker;Cane  (no AD used at baseline; leg )   Prior Function   Level of [place] Independent with ADLs;Independent with functional mobility;Independent with IADLS   Lives With Spouse   Receives Help From Family   IADLs Independent with driving;Independent with meal prep;Family/Friend/Other provides medication management   Falls in the last 6 months 0  (pt denies)   Vocational Retired   General   Family/Caregiver Present Yes   Cognition   Overall Cognitive Status WFL   Arousal/Participation Alert   Attention Within functional limits   Orientation Level Oriented X4   Memory Within functional limits   Following Commands Follows all commands and directions without difficulty   RUE Assessment   RUE Assessment WFL   RUE Strength   RUE Overall Strength   (4-/5)   LUE Assessment   LUE Assessment WFL   LUE Strength   LUE Overall Strength   (4-/5)   RLE Assessment   RLE Assessment WFL   LLE Assessment   LLE Assessment   (NT due to pain)   Vision-Basic Assessment   Current Vision Wears glasses all the time   Coordination   Sensation WFL   Bed Mobility   Supine to Sit 5  Supervision   Additional items Assist x 1;Increased time required;Verbal cues;LE  management   Additional Comments pt denied dizziness with transitional movement   Transfers   Sit to Stand   (CGA)   Additional items Assist x 1;Increased time required;Verbal cues   Stand to Sit   (CGA)   Additional items Assist x 1;Increased time required;Verbal cues   Stand pivot   (CGA)   Additional items Assist x 1;Increased time required  (with RW)   Additional Comments pt required cues for proper hand placement  (cues that pt can WB through LLE and doesn't have to hop)   Ambulation/Elevation   Ambulation/Elevation Additional Comments good safety awareness noted   Balance   Static Sitting Good   Dynamic Sitting Fair +   Static Standing Fair   Dynamic Standing Fair -   Endurance Deficit   Endurance Deficit Yes   Endurance Deficit Description pt reported fatigue with activity   Activity Tolerance   Activity Tolerance Patient limited by fatigue;Patient limited by pain   Nurse Made Aware RN made aware of heme vac leaking; RN reinforced area   Assessment   Prognosis Good   Assessment Pt is 64 y.o. male seen for PT evaluation s/p admit to [facility] on 11/18/2024 w/ Primary osteoarthritis of left knee. PT consulted to assess pt's functional mobility and d/c needs. Order placed for PT eval and tx, w/  FWB LLE  order. Pt agreeable to PT  session upon arrival, pt found supine in bed.  PTA, pt was independent w/ all functional mobility w/ no AD, ambulates unrestricted distances and all terrain, has 3 LAWRENCE, lives w/ wife in 2 level home, and retired.  Pt to benefit from continued PT tx to address deficits and maximize level of functional independent mobility and consistency. Upon conclusion pt  seated in recliner. Complexity: Comorbidities affecting pt's physical performance at time of assessment include: COPD, DM, a fib, and CAD. Personal factors affecting pt at time of IE include: lives in multistory home, ambulating with assistive device, and steps to enter home. Please find objective findings from PT assessment  regarding body systems outlined above with impairments and limitations including weakness, decreased ROM, impaired balance, pain, decreased activity tolerance, decreased functional mobility tolerance, and fall risk.  Pt's clinical presentation is currently unstable/unpredictable seen in pt's presentation of new onset of O2 use, pain, recent surgery, and polypharmacy. The patient's AM-[facility] Basic Mobility Inpatient Short Form Raw Score is 17.  Based on patient presentations and impairments, pt would most appropriately benefit from Level 3 resource intensity upon discharge. Please also refer to the recommendation of the Physical Therapist for safe discharge planning. RN verbalized pt appropriate for PT session. Pt seen as a co-eval with OT due to the patient's co-morbidities, clinically unstable presentation, and present impairments which are a regression from the patient's baseline.   Barriers to Discharge Inaccessible home environment   Goals   Patient Goals to go home   LTG Expiration Date 11/28/24   Long Term Goal #1 Pt will: Perform bed mobility tasks to modified I to improve ease of bed mobility. Perform transfers to modified I to improve ease of transfers. Perform ambulation with MI and RW for 250 feet to increase Indep in home environment. Increase dynamic standing balance to F+ to decrease fall risk. Increase OOB activity tolerance to 10 minutes without s/s of exertion to decrease fall risk. Navigate up and down steps with MI so patient 2 can enter and exit home.   Plan   Treatment/Interventions Functional transfer training;Therapeutic exercise;Endurance training;Gait training;Bed mobility;Equipment eval/education;Elevations   PT Frequency Twice a day   Discharge Recommendation   Rehab Resource Intensity Level, PT III (Minimum Resource Intensity)   Equipment Recommended Walker   Walker Package Recommended Wheeled walker   AM-[facility] Basic Mobility Inpatient   Turning in Flat Bed Without Bedrails 3   Lying on Back  to Sitting on Edge of Flat Bed Without Bedrails 3   Moving Bed to Chair 3   Standing Up From Chair Using Arms 3   Walk in Room 3   Climb 3-5 Stairs With Railing 2   Basic Mobility Inpatient Raw Score 17   Basic Mobility Standardized Score 39.67   [facility] Highest Level Of Mobility   -[facility] Goal 5: Stand one or more mins   -HLM Achieved 4: Move to chair/commode       Time In: 1415  Time Out: 1429  Total Evaluation Minutes: 14    Leann Knight, PT

## 2024-11-18 NOTE — ANESTHESIA PREPROCEDURE EVALUATION
Procedure:  ARTHROPLASTY KNEE TOTAL (Left: Knee)    Relevant Problems   ANESTHESIA   (+) PONV (postoperative nausea and vomiting)      CARDIO   (+) CAD (coronary artery disease)   (+) Essential hypertension   (+) Paroxysmal A-fib (HCC)      ENDO   (+) Diabetes mellitus, type II (HCC)      /RENAL   (+) Chronic kidney disease (CKD), stage II (mild)   (+) Stage 3a chronic kidney disease (HCC)      MUSCULOSKELETAL   (+) Primary osteoarthritis of left knee      NEURO/PSYCH   (+) Diabetes mellitus due to underlying condition with diabetic neuropathy, without long-term current use of insulin (HCC)      PULMONARY   (+) COPD (chronic obstructive pulmonary disease) (HCC)   (+) Sleep apnea        Physical Exam    Airway    Mallampati score: III         Dental       Cardiovascular  Rhythm: regular, Rate: normal    Pulmonary   Breath sounds clear to auscultation    Other Findings  Poor dentition      Anesthesia Plan  ASA Score- 3     Anesthesia Type- spinal with ASA Monitors.         Additional Monitors:     Airway Plan:     Comment: adductor.       Plan Factors-Exercise tolerance (METS): >4 METS.    Chart reviewed. EKG reviewed.  Existing labs reviewed. Patient summary reviewed.    Patient is not a current smoker.      Obstructive sleep apnea risk education given perioperatively.        Induction- intravenous.    Postoperative Plan- Plan for postoperative opioid use.     Perioperative Resuscitation Plan - Level 1 - Full Code.       Informed Consent- Anesthetic plan and risks discussed with patient.

## 2024-11-18 NOTE — OCCUPATIONAL THERAPY NOTE
Occupational Therapy Evaluation     Patient Name: Robert Benson  Today's Date: 11/18/2024  Problem List  Principal Problem:    Primary osteoarthritis of left knee  Active Problems:    PONV (postoperative nausea and vomiting)    COPD (chronic obstructive pulmonary disease) (Formerly [facility])    CAD (coronary artery disease)    Sleep apnea    Past Medical History  Past Medical History:   Diagnosis Date    Bilateral flank pain 05/11/2023    CAD (coronary artery disease)     nonobstructive RCA via CT 2013    CKD (chronic kidney disease)     Colon polyp     COPD (chronic obstructive pulmonary disease) (Formerly [facility])     CPAP (continuous positive airway pressure) dependence     Diabetes mellitus (HCC)     GERD (gastroesophageal reflux disease)     Heart murmur     Hyperlipidemia     Hypertension     PAF (paroxysmal atrial fibrillation) (Formerly [facility])     PONV (postoperative nausea and vomiting)     Sleep apnea     cpap    Wears hearing aid      Past Surgical History  Past Surgical History:   Procedure Laterality Date    APPENDECTOMY N/A     COLONOSCOPY      JOINT REPLACEMENT Right 2014    Knee    MYOTOMY HELLER LAPAROSCOPIC  2018    [place]    STOMACH SURGERY          11/18/24 1342   OT Last Visit   OT Visit Date 11/18/24   Note Type   Note type Evaluation   Additional Comments Pt seen as a co-eval with PT due to the patient's co-morbidities, clinically unstable presentation, and present impairments which are a regression from the patient's baseline.   Pain Assessment   Pain Assessment Tool 0-10   Pain Score 4  (4/10 at rest; 5/10 post activity)   Pain Location/Orientation Orientation: Left;Location: Knee   Pain Radiating Towards up into L buttock   Pain Onset/Description Onset: Ongoing;Frequency: Constant/Continuous   Effect of Pain on Daily Activities n/a   Patient's Stated Pain Goal No pain   Hospital Pain Intervention(s) Medication (See MAR);Cold applied;Repositioned   Multiple Pain Sites No   Restrictions/Precautions   Weight Bearing Precautions  "Per Order Yes   LLE Weight Bearing Per Order FWB   Other Precautions Fall Risk;Pain;Chair Alarm;Bed Alarm;WBS   Home Living   Type of Home House   Home Layout Two level;Stairs to enter without rails;1/2 bath on main level;Bed/bath upstairs  (2 LAWRENCE; FOS to 2nd floor)   Bathroom Shower/Tub Walk-in shower   Bathroom Toilet Standard   Bathroom Equipment Hand-held shower   Bathroom Accessibility Accessible   Home Equipment Walker;Cane  (Pt reports having RW, SPC and leg  but denies use)   Prior Function   Level of [place] Independent with ADLs;Independent with functional mobility;Independent with IADLS   Lives With Spouse   Receives Help From Family   IADLs Independent with driving;Independent with meal prep;Family/Friend/Other provides medication management   Falls in the last 6 months 0  (pt denies)   Vocational Retired   Lifestyle   Autonomy Pt resides in 2 level home with spouse; I with ADls and IADLs; +    Reciprocal Relationships spouse   Subjective   Subjective \"Are you going to make me skip down the galindo?\"   ADL   Where Assessed Edge of bed   Eating Assistance 7  Independent   Grooming Assistance 7  Independent   UB Bathing Assistance 6  Modified Independent   LB Bathing Assistance 4  Minimal Assistance   UB Dressing Assistance 6  Modified independent   LB Dressing Assistance 3  Moderate Assistance   LB Dressing Deficit Don/doff R sock;Don/doff L sock;Thread LLE into pants;Thread RLE into pants;Pull up over hips  (Pt reports spouse available to assist with LB dressing; mod A to thread shorts over BLE and pull up while in stance d/t balance deficits)   Additional Comments Given functional performance skills + medical complexity, therapist suspects via clinical judgement + skilled analysis; pt currently requires stated assist above to perform each area of ADLs d/t limitations including: pain, functional mobility, functional activity tolerance and balance   Bed Mobility   Supine to Sit 5  " Supervision   Additional items Assist x 1;Increased time required;Verbal cues;LE management   Sit to Supine   (DNT; pt seated in recliner upon conclusion of session)   Additional Comments VC for bedrail utilization and safe hand placement  (prior to mobilization, hemovac loosened while patient supine in bed - RN aware and reinforced bandages. RN and Dr. Rodriguez verbalized appropriateness of movement)   Transfers   Sit to Stand   (CGA)   Additional items Assist x 1;Increased time required;Verbal cues   Stand to Sit   (CGA)   Additional items Assist x 1;Increased time required;Verbal cues   Stand pivot   (CGA)   Additional items Assist x 1;Increased time required   Additional Comments RW used; VC for safe hand placement and proper body mechanics - therapist educated patient on weight-bearing status during mobility   Balance   Static Sitting Good   Dynamic Sitting Fair +   Static Standing Fair   Dynamic Standing Fair -   Activity Tolerance   Activity Tolerance Patient limited by fatigue   Medical Staff Made Aware Yes, CM made aware of d/c recs   Nurse Made Aware Yes, nursing staff made aware of session outcomes   RUE Assessment   RUE Assessment WFL   RUE Strength   RUE Overall Strength   (4-/5)   LUE Assessment   LUE Assessment WFL   LUE Strength   LUE Overall Strength   (4-/5)   Hand Function   Gross Motor Coordination Functional   Fine Motor Coordination Functional   Sensation   Additional Comments mild neuropathy of BLE   Vision-Basic Assessment   Current Vision Wears glasses all the time   Psychosocial   Psychosocial (WDL) WDL   Cognition   Overall Cognitive Status WFL   Arousal/Participation Alert;Responsive;Cooperative   Attention Within functional limits   Orientation Level Oriented X4   Memory Within functional limits   Following Commands Follows all commands and directions without difficulty   Comments Pt agreeable to OT evaluation, pleasant   Assessment   Limitation Decreased ADL status;Decreased Safe judgement  during ADL;Decreased endurance;Decreased high-level ADLs   Prognosis Good   Assessment Pt is a 64 y.o. male seen for OT evaluation s/p admit to [facility] on 11/18/2024 w/ Primary osteoarthritis of left knee.  Comorbidities affecting pt's functional performance at time of assessment include: PONV, COPD, CAD, a-fib. Personal factors affecting pt at time of IE include:steps to enter environment, difficulty performing ADLS, difficulty performing IADLS , decreased initiation and engagement , and health management . Prior to admission, pt was I with ADLs and IADLs. Upon evaluation: the following deficits impact occupational performance: weakness, decreased strength, decreased balance, decreased tolerance, decreased safety awareness, and increased pain. Pt to benefit from continued skilled OT tx while in the hospital to address deficits as defined above and maximize level of functional independence w ADL's and functional mobility. Occupational Performance areas to address include: grooming, bathing/shower, toilet hygiene, dressing, functional mobility, community mobility, and clothing management. From OT standpoint, recommendation at time of d/c would with minimal intensity OT resources.   Goals   Patient Goals to go home   Plan   Treatment Interventions ADL retraining;Functional transfer training;UE strengthening/ROM;Endurance training;Patient/family training;Energy conservation;Activityengagement   Goal Expiration Date 11/28/24   OT Treatment Day 0   OT Frequency 3-5x/wk   Discharge Recommendation   Rehab Resource Intensity Level, OT III (Minimum Resource Intensity)  (outpatient PT services)   Additional Comments  The patient's raw score on the AM-PAC Daily Activity inpatient short form is 20, standardized score is 42.03, greater than 39.4. Patients at this level are likely to benefit from discharge with minimal intensity OT resources. Please refer to the recommendation of the Occupational Therapist for safe discharge  planning.   AM-PAC Daily Activity Inpatient   Lower Body Dressing 2   Bathing 3   Toileting 3   Upper Body Dressing 4   Grooming 4   Eating 4   Daily Activity Raw Score 20   Daily Activity Standardized Score (Calc for Raw Score >=11) 42.03   AM-PAC Applied Cognition Inpatient   Following a Speech/Presentation 4   Understanding Ordinary Conversation 4   Taking Medications 4   Remembering Where Things Are Placed or Put Away 4   Remembering List of 4-5 Errands 4   Taking Care of Complicated Tasks 4   Applied Cognition Raw Score 24   Applied Cognition Standardized Score 62.21     GOALS:  Pt will achieve the following within specified time frame: LTG  Pt will achieve the following goals within 10 days    *ADL transfers with (S) for inc'd independence with ADLs/purposeful tasks    *UB ADL with (I) for inc'd independence with self cares    *LB ADL with (S) using AE prn for inc'd independence with self cares    *Toileting with (S) for clothing management and hygiene for return to PLOF with personal care    *Increase static stand balance and dyn stand balance to G for inc'd safety with standing purposeful tasks    *Increase stand tolerance x7 m for inc'd tolerance with standing purposeful tasks    *Bed mobility- (I) for inc'd independence to manage own comfort and initiate EOB & OOB purposeful tasks    *Participate in 10-15m UE therex to increase overall stamina/activity tolerance for purposeful tasks      Carla Mendez MS, OTR/L

## 2024-11-18 NOTE — ANESTHESIA POSTPROCEDURE EVALUATION
Post-Op Assessment Note    CV Status:  Stable  Pain Score: 1    Pain management: adequate       Mental Status:  Alert and awake   Hydration Status:  Euvolemic   PONV Controlled:  Controlled   Airway Patency:  Patent     Post Op Vitals Reviewed: Yes    No anethesia notable event occurred.    Staff: Anesthesiologist           Last Filed PACU Vitals:  Vitals Value Taken Time   Temp 97.1 °F (36.2 °C) 11/18/24 0935   Pulse 66 11/18/24 1001   /52 11/18/24 1000   Resp 26 11/18/24 1001   SpO2 98 % 11/18/24 1001   Vitals shown include unfiled device data.    Modified Andie:  Activity: 1 (11/18/2024  9:50 AM)  Respiration: 2 (11/18/2024  9:50 AM)  Circulation: 2 (11/18/2024  9:50 AM)  Consciousness: 2 (11/18/2024  9:50 AM)  Oxygen Saturation: 1 (11/18/2024  9:50 AM)  Modified Andie Score: 8 (11/18/2024  9:50 AM)

## 2024-11-18 NOTE — PLAN OF CARE
Problem: OCCUPATIONAL THERAPY ADULT  Goal: Performs self-care activities at highest level of function for planned discharge setting.  See evaluation for individualized goals.  Description: Treatment Interventions: ADL retraining, Functional transfer training, UE strengthening/ROM, Endurance training, Patient/family training, Energy conservation, Activityengagement      See flowsheet documentation for full assessment, interventions and recommendations.   Note: Limitation: Decreased ADL status, Decreased Safe judgement during ADL, Decreased endurance, Decreased high-level ADLs  Prognosis: Good  Assessment: Pt is a 64 y.o. male seen for OT evaluation s/p admit to [facility] on 11/18/2024 w/ Primary osteoarthritis of left knee.  Comorbidities affecting pt's functional performance at time of assessment include: PONV, COPD, CAD, a-fib. Personal factors affecting pt at time of IE include:steps to enter environment, difficulty performing ADLS, difficulty performing IADLS , decreased initiation and engagement , and health management . Prior to admission, pt was I with ADLs and IADLs. Upon evaluation: the following deficits impact occupational performance: weakness, decreased strength, decreased balance, decreased tolerance, decreased safety awareness, and increased pain. Pt to benefit from continued skilled OT tx while in the hospital to address deficits as defined above and maximize level of functional independence w ADL's and functional mobility. Occupational Performance areas to address include: grooming, bathing/shower, toilet hygiene, dressing, functional mobility, community mobility, and clothing management. From OT standpoint, recommendation at time of d/c would with minimal intensity OT resources.     Rehab Resource Intensity Level, OT: III (Minimum Resource Intensity) (outpatient PT services)     Carla [facility]/DAVE

## 2024-11-18 NOTE — DISCHARGE INSTR - AVS FIRST PAGE
TOTAL KNEE/TOTAL HIP REPLACEMENT  POST OPERATIVE INFORMATION    1. You should use a walker or crutches, but you may put as much weight on the leg as is comfortable unless instructed otherwise.  2. You may use ice packs as needed for pain and swelling.  20 minutes is required to allow the cold to penetrate deep enough.  Cover skin with a layer of cloth before applying the ice pack.    3. Pump your ankle up and down frequently throughout the day to facilitate circulation, maintain muscle tone, and to aid in reduction of swelling.  4. You may shower after 5 days, but remember to keep the dressings dry.  5. You should call our office if you experience pain not controlled by your medication, develop a fever, and experience increased drainage or redness around the incision.  6. Do not drive a vehicle until you see your physician at the follow-up appointment.  7. Refer to your total joint card regarding the need for antibiotics for the following procedures:  Any dental procedures, any infection, especially skin infections, vaginal or GYN exams or surgery, and colonoscopy.  8. If you have had a total hip replacement following instructions below to prevent the hip from sliding out a position:   -DO NOT bend your hip more than 90°.  This means no squatting, no bending over to tie your shoes, and no bending from the waist to  things from the floor, even if seated.   -DO NOT cross your operated leg/foot inward (Tomah-toed)   -ALWAYS sleep with pillow or cushion between your legs, as instructed by your doctor   -After surgery these precautions will be again covered by your therapists on a daily basis.  9. Call our office for an appointment to see Dr. Rodriguez in about 14 days.  10. You should start outpatient therapy as soon as possible after discharge.   11. The patient was instructed to paint incision with betadine two times per day

## 2024-11-18 NOTE — OP NOTE
OPERATIVE REPORT  PATIENT NAME: Robert Benson  : 1960  MRN: [id number]  Pt Location:  [facility] OR ROOM 02    Surgery Date: 2024    Surgeons and Role:     * Ehsan Rodriguez, DO - Primary     * Drake Vasquez PA-C - Assisting     Preop Diagnosis:  Primary osteoarthritis of left knee M17.12    Post-Op Diagnosis Codes:     * Primary osteoarthritis of left knee [M17.12]    Procedure(s):  Left - ARTHROPLASTY KNEE TOTAL using the [employer] NexGen system with the following sizes: F LPS femoral component, #7 tibial tray, 14 mm poly articular surface, and a 41 mm patella button    Specimens:  Bone/synovium    Estimated Blood Loss:   50 cc    Drains:  Hemovac (solcotrans is on backorder)    Anesthesia Type:   Spinal with adductor canal block    Operative Indications:  Primary osteoarthritis of left knee M17.12    Operative Findings:  TT: 75    Complications:   None      Knee Approach: Medial Parapatellar        Procedure and Technique:      The patient was properly identified and brought to the operative suite.  After successful induction of the anesthetic, a tourniquet was placed on the patient's left proximal thigh.  The left lower extremity was prepped and draped in the usual usual sterile fashion.      It was medically necessary that the physician's assistant be in the room to aid in positioning placing the appropriate amount of retraction the patient.  A qualified resident was not available.  The physician assistant's role was very integral to the success of this case.  He assisted on positioning, draping, retraction soft tissue, retraction neurovascular bundles, assisted with implantation of prosthesis, assisted with reduction of prosthesis, and assisted with closure of a complex wound      He was given a dose of intravenous antibiotics.  Surgical landmarks were outlined  With  a skin marker.  An Esmarch was used to examine the left lower extremity and the tourniquet was then inflated.  Using a #10.   Scalpel  Blade, an anterior incision was made on patient's left knee.  Hemostasis was achieved with the use of electrocautery.  Through a  significant amount of dissection through adipose tissue, the capsule and  quadriceps tendon  Was  then located.  Using a 2nd #10.  Scalpel blade a medial parapatellar arthrotomy did occur with rush of clear synovial fluid.  A posteriormedial sleeve was developed to the level of the semimembranous  tendon. The patella then everted and  knee was flexed.  The retro and  superior fat pads were excised. The cruciate ligaments were  divided. At this point the proximal tibia could  be anteriorly subluxed.  An intramedullary drill hole was placed in the proximal tibia, followed by the external cutting jig.  The  smallest portion of deficient medial side bone was to  be removed, with its corresponding lateral side.  Varus and  valgus angulation was also checked.  The collateral ligaments were  protected.  The neurovascular bundle was then protected.  The proximal tibia was then osteotomized.  Attention then paid to the preparation of distal femur.  The intramedullary azalea was placed with a 6 degree valgus cut placed approximately 3° of external rotation.  The external  cutting jig was placed on top of this.  An additional 2 mm cut the because of a flexion contracture.  The collateral ligaments were  protected.  The  distal femur was then osteotomized.  The femur was then sized.  A size “F” did have the  best fit.   Both gender and  standard cutting blocks were checked and a standard specific  cutting block did  Have the best fit  Without any excessive notching of the anterior condyle.  The collaterals were then protected, the distal femur was then osteotomized in the following sequence 1 anterior condyle 2. posterior condyle 3 posterior chamfer and 4 anterior chamfer.  At this point all the bone fragments and then removed.  A lamina  was placed both the medial and lateral sides and  the redundant menisci and posterior osteophytes were then removed.  Flexion-extension blocks were placed next and a 14 mm block gave good symmetric balancing.  The femur was then finished with the trochlear recess and notch block placed in a slightly lateral position to facilitate tracking patella. The tibial was sized next.  A size #7 did have  best fit.  The size F LPS standard femoral component placed, followed by a #7  Tibial tray, followed by several polyarticular services and a 14mm tray did the best fit.  The rotation of the  tibia was then marked.  The patella was inspected next.  There was a tremendous amount of arthrosis.  The  peripheral osteophytes were removed.  It was confirmed with a caliper that there is adequate bone stock.  And then using the Stribe reaming system approximately 9 mm of undersurface patella then reamed.  The patella sized to a 41 mm patella button.  This guide was drilled in a slightly superior medial position to  facilitate tracking of the patella.  The tibia was then finished with a drill hole drill hole and keel punch.  At this point there was good stability and range of motion in the knee.  All the trial components removed.  The wound was copiously irrigated sterile antibiotic solution. Cement  was being mixed on the back table was used 3rd generation cementing techniques.  The proximal tibia was cemented in placed followed by the distal femur.  A trial poly articular surface was placed till the cement fully cured.  The patella was  cemented in place and held with a patellar clamp till the cement fully cured as well. Once the cement fully cured, it was irrigated.  The polyarticular surfaces were  tried once again and a 14 mm tray to the best fit.  The final 14 mm tray was placed reduced 1 final time and found to be quite stable.  After it was irrigated,  a quarter-inch Hemovac (solcotrans is on backorder)  was  placed near the superior aspect  Of the incision.  The quadriceps   and capsule were closed with #1  Vicryl.  Deep layer fascia closed multiple layers of 0 Vicryl.  Superficial was closed with 2-0 Vicryl.  The skin was approximated  With staples.  The wounds were dressed with ointment Xeroform 4x4s ABDs sterile Webril and an Ace bandage.  There was no complication of procedure. He was successfully awoken,  transferred  To the hospital bed,  And went to the recovery room stable  In condition.          I was present for the entire procedure., A qualified resident physician was not available., and A physician assistant was required during the procedure for retraction, tissue handling, dissection and suturing.    Patient Disposition:  PACU             SIGNATURE: Ehsan Rodriguez DO  DATE: November 18, 2024  TIME: 7:33 AM

## 2024-11-18 NOTE — ANESTHESIA POSTPROCEDURE EVALUATION
Post-Op Assessment Note    CV Status:  Stable  Pain Score: 0    Pain management: adequate       Mental Status:  Arousable   Hydration Status:  Stable   PONV Controlled:  None     Post Op Vitals Reviewed: Yes    No anethesia notable event occurred.    Staff: CRNA       Last Filed PACU Vitals:  Vitals Value Taken Time   Temp 97.1    Pulse 62 11/18/24 0939   /59 11/18/24 0938   Resp 14 11/18/24 0939   SpO2 91 % 11/18/24 0939   Vitals shown include unfiled device data.    Modified Andie:  No data recorded

## 2024-11-18 NOTE — ANESTHESIA PROCEDURE NOTES
Peripheral Block    Patient location during procedure: holding area  Start time: 11/18/2024 7:20 AM  Reason for block: at surgeon's request and post-op pain management  Staffing  Performed by: Travis Hill DO  Authorized by: Travis Hill DO    Preanesthetic Checklist  Completed: patient identified, IV checked, site marked, risks and benefits discussed, surgical consent, monitors and equipment checked, pre-op evaluation and timeout performed  Peripheral Block  Patient position: supine  Prep: ChloraPrep  Patient monitoring: continuous pulse oximetry, frequent blood pressure checks and heart rate  Block type: Adductor Canal  Laterality: left  Injection technique: single-shot  Procedures: ultrasound guided, Ultrasound guidance required for the procedure to increase accuracy and safety of medication placement and decrease risk of complications.  Ultrasound permanent image saved  bupivacaine (PF) (MARCAINE) 0.5 % injection 20 mL - Perineural   10 mL - 11/18/2024 7:26:00 AM  bupivacaine liposomal (EXPAREL) 1.3 % injection 20 mL - Perineural   10 mL - 11/18/2024 7:26:00 AM  midazolam (VERSED) injection 0.5 mg - Intravenous   2 mg - 11/18/2024 7:26:00 AM  fentanyl citrate (PF) 100 MCG/2ML 50 mcg - Intravenous   100 mcg - 11/18/2024 7:26:00 AM  Needle  Needle type: Stimuplex   Needle gauge: 20 G  Needle length: 4 in  Needle localization: ultrasound guidance  Assessment  Injection assessment: frequent aspiration, injected with ease, negative aspiration, no paresthesia on injection, incremental injection, needle tip visualized at all times, negative for heart rate change and no symptoms of intraneural/intravenous injection  Paresthesia pain: none  Post-procedure:  site cleaned  patient tolerated the procedure well with no immediate complications

## 2024-11-18 NOTE — PLAN OF CARE
Problem: PHYSICAL THERAPY ADULT  Goal: Performs mobility at highest level of function for planned discharge setting.  See evaluation for individualized goals.  Description: Treatment/Interventions: Functional transfer training, Therapeutic exercise, Endurance training, Gait training, Bed mobility, Equipment eval/education, Elevations  Equipment Recommended: Walker       See flowsheet documentation for full assessment, interventions and recommendations.  Outcome: Progressing  Note: Prognosis: Good     Assessment: Pt is 64 y.o. male seen for PT evaluation s/p admit to [facility] on 11/18/2024 w/ Primary osteoarthritis of left knee. PT consulted to assess pt's functional mobility and d/c needs. Order placed for PT eval and tx, w/  FWB LLE  order. Pt agreeable to PT  session upon arrival, pt found supine in bed.  PTA, pt was independent w/ all functional mobility w/ no AD, ambulates unrestricted distances and all terrain, has 3 LAWRENCE, lives w/ wife in 2 level home, and retired.  Pt to benefit from continued PT tx to address deficits and maximize level of functional independent mobility and consistency. Upon conclusion pt  seated in recliner. Complexity: Comorbidities affecting pt's physical performance at time of assessment include: COPD, DM, a fib, and CAD. Personal factors affecting pt at time of IE include: lives in multistory home, ambulating with assistive device, and steps to enter home. Please find objective findings from PT assessment regarding body systems outlined above with impairments and limitations including weakness, decreased ROM, impaired balance, pain, decreased activity tolerance, decreased functional mobility tolerance, and fall risk.  Pt's clinical presentation is currently unstable/unpredictable seen in pt's presentation of new onset of O2 use, pain, recent surgery, and polypharmacy. The patient's AM-PAC Basic Mobility Inpatient Short Form Raw Score is 17.  Based on patient presentations and  impairments, pt would most appropriately benefit from Level 3 resource intensity upon discharge. Please also refer to the recommendation of the Physical Therapist for safe discharge planning. RN verbalized pt appropriate for PT session. Pt seen as a co-eval with OT due to the patient's co-morbidities, clinically unstable presentation, and present impairments which are a regression from the patient's baseline.  Barriers to Discharge: Inaccessible home environment     Rehab Resource Intensity Level, PT: III (Minimum Resource Intensity)    See flowsheet documentation for full assessment.

## 2024-11-18 NOTE — INTERVAL H&P NOTE
H&P reviewed. After examining the patient I find no changes in the patients condition since the H&P had been written.  Cardiovascular: Normal rate    Pulmonary: Effort normal  Abdomen: Soft, nontender, nondistended    Vitals:    11/18/24 0637   BP: 148/80   Pulse: 75   Resp: 16   Temp: 98 °F (36.7 °C)   SpO2: 97%

## 2024-11-19 DIAGNOSIS — M17.12 PRIMARY OSTEOARTHRITIS OF LEFT KNEE: Primary | ICD-10-CM

## 2024-11-19 PROBLEM — D62 ABLA (ACUTE BLOOD LOSS ANEMIA): Status: ACTIVE | Noted: 2024-11-19

## 2024-11-19 LAB
ANION GAP SERPL CALCULATED.3IONS-SCNC: 8 MMOL/L (ref 4–13)
ANION GAP SERPL CALCULATED.3IONS-SCNC: 9 MMOL/L (ref 4–13)
ANION GAP SERPL CALCULATED.3IONS-SCNC: 9 MMOL/L (ref 4–13)
BUN SERPL-MCNC: 17 MG/DL (ref 5–25)
CALCIUM SERPL-MCNC: 8.8 MG/DL (ref 8.4–10.2)
CALCIUM SERPL-MCNC: 9 MG/DL (ref 8.4–10.2)
CALCIUM SERPL-MCNC: 9 MG/DL (ref 8.4–10.2)
CHLORIDE SERPL-SCNC: 95 MMOL/L (ref 96–108)
CHLORIDE SERPL-SCNC: 95 MMOL/L (ref 96–108)
CHLORIDE SERPL-SCNC: 97 MMOL/L (ref 96–108)
CO2 SERPL-SCNC: 22 MMOL/L (ref 21–32)
CO2 SERPL-SCNC: 24 MMOL/L (ref 21–32)
CO2 SERPL-SCNC: 24 MMOL/L (ref 21–32)
CREAT SERPL-MCNC: 1.31 MG/DL (ref 0.6–1.3)
CREAT SERPL-MCNC: 1.33 MG/DL (ref 0.6–1.3)
CREAT SERPL-MCNC: 1.36 MG/DL (ref 0.6–1.3)
CREAT UR-MCNC: 100 MG/DL
ERYTHROCYTE [DISTWIDTH] IN BLOOD BY AUTOMATED COUNT: 13.2 % (ref 11.6–15.1)
GFR SERPL CREATININE-BSD FRML MDRD: 54 ML/MIN/1.73SQ M
GFR SERPL CREATININE-BSD FRML MDRD: 55 ML/MIN/1.73SQ M
GFR SERPL CREATININE-BSD FRML MDRD: 56 ML/MIN/1.73SQ M
GLUCOSE SERPL-MCNC: 128 MG/DL (ref 65–140)
GLUCOSE SERPL-MCNC: 133 MG/DL (ref 65–140)
GLUCOSE SERPL-MCNC: 134 MG/DL (ref 65–140)
GLUCOSE SERPL-MCNC: 139 MG/DL (ref 65–140)
GLUCOSE SERPL-MCNC: 141 MG/DL (ref 65–140)
GLUCOSE SERPL-MCNC: 151 MG/DL (ref 65–140)
GLUCOSE SERPL-MCNC: 161 MG/DL (ref 65–140)
HCT VFR BLD AUTO: 33.5 % (ref 36.5–49.3)
HGB BLD-MCNC: 10.9 G/DL (ref 12–17)
MCH RBC QN AUTO: 27.7 PG (ref 26.8–34.3)
MCHC RBC AUTO-ENTMCNC: 32.5 G/DL (ref 31.4–37.4)
MCV RBC AUTO: 85 FL (ref 82–98)
PLATELET # BLD AUTO: 230 THOUSANDS/UL (ref 149–390)
PMV BLD AUTO: 10.3 FL (ref 8.9–12.7)
POTASSIUM SERPL-SCNC: 3.5 MMOL/L (ref 3.5–5.3)
POTASSIUM SERPL-SCNC: 3.8 MMOL/L (ref 3.5–5.3)
POTASSIUM SERPL-SCNC: 3.9 MMOL/L (ref 3.5–5.3)
RBC # BLD AUTO: 3.93 MILLION/UL (ref 3.88–5.62)
SODIUM 24H UR-SCNC: 62 MOL/L
SODIUM SERPL-SCNC: 126 MMOL/L (ref 135–147)
SODIUM SERPL-SCNC: 128 MMOL/L (ref 135–147)
SODIUM SERPL-SCNC: 129 MMOL/L (ref 135–147)
WBC # BLD AUTO: 10.4 THOUSAND/UL (ref 4.31–10.16)

## 2024-11-19 PROCEDURE — 99223 1ST HOSP IP/OBS HIGH 75: CPT | Performed by: INTERNAL MEDICINE

## 2024-11-19 PROCEDURE — 83935 ASSAY OF URINE OSMOLALITY: CPT

## 2024-11-19 PROCEDURE — 83930 ASSAY OF BLOOD OSMOLALITY: CPT

## 2024-11-19 PROCEDURE — 80048 BASIC METABOLIC PNL TOTAL CA: CPT

## 2024-11-19 PROCEDURE — 97116 GAIT TRAINING THERAPY: CPT

## 2024-11-19 PROCEDURE — 99222 1ST HOSP IP/OBS MODERATE 55: CPT | Performed by: PHYSICIAN ASSISTANT

## 2024-11-19 PROCEDURE — 80048 BASIC METABOLIC PNL TOTAL CA: CPT | Performed by: PHYSICIAN ASSISTANT

## 2024-11-19 PROCEDURE — 84300 ASSAY OF URINE SODIUM: CPT

## 2024-11-19 PROCEDURE — 82948 REAGENT STRIP/BLOOD GLUCOSE: CPT

## 2024-11-19 PROCEDURE — 82570 ASSAY OF URINE CREATININE: CPT

## 2024-11-19 PROCEDURE — 99024 POSTOP FOLLOW-UP VISIT: CPT | Performed by: PHYSICIAN ASSISTANT

## 2024-11-19 PROCEDURE — 97530 THERAPEUTIC ACTIVITIES: CPT

## 2024-11-19 PROCEDURE — 85027 COMPLETE CBC AUTOMATED: CPT | Performed by: PHYSICIAN ASSISTANT

## 2024-11-19 PROCEDURE — 97110 THERAPEUTIC EXERCISES: CPT

## 2024-11-19 RX ORDER — SODIUM CHLORIDE 9 MG/ML
100 INJECTION, SOLUTION INTRAVENOUS CONTINUOUS
Status: DISPENSED | OUTPATIENT
Start: 2024-11-19 | End: 2024-11-20

## 2024-11-19 RX ORDER — DIPHENHYDRAMINE HCL 25 MG
25 TABLET ORAL ONCE
Status: COMPLETED | OUTPATIENT
Start: 2024-11-19 | End: 2024-11-19

## 2024-11-19 RX ORDER — FONDAPARINUX SODIUM 2.5 MG/.5ML
2.5 INJECTION SUBCUTANEOUS EVERY 24 HOURS
Qty: 7 ML | Refills: 0 | Status: SHIPPED | OUTPATIENT
Start: 2024-11-19 | End: 2024-12-03

## 2024-11-19 RX ORDER — SODIUM CHLORIDE 1 G/1
1 TABLET ORAL 2 TIMES DAILY WITH MEALS
Status: DISCONTINUED | OUTPATIENT
Start: 2024-11-19 | End: 2024-11-20 | Stop reason: HOSPADM

## 2024-11-19 RX ADMIN — OXYCODONE HYDROCHLORIDE AND ACETAMINOPHEN 500 MG: 500 TABLET ORAL at 08:41

## 2024-11-19 RX ADMIN — OXYCODONE HYDROCHLORIDE 10 MG: 10 TABLET ORAL at 08:40

## 2024-11-19 RX ADMIN — OXYCODONE HYDROCHLORIDE 10 MG: 10 TABLET ORAL at 22:06

## 2024-11-19 RX ADMIN — ATORVASTATIN CALCIUM 40 MG: 40 TABLET, FILM COATED ORAL at 17:25

## 2024-11-19 RX ADMIN — DOCUSATE SODIUM 100 MG: 100 CAPSULE, LIQUID FILLED ORAL at 17:25

## 2024-11-19 RX ADMIN — FENOFIBRATE 145 MG: 145 TABLET, FILM COATED ORAL at 08:41

## 2024-11-19 RX ADMIN — SODIUM CHLORIDE 1 G: 1 TABLET ORAL at 18:06

## 2024-11-19 RX ADMIN — Medication 1 TABLET: at 08:41

## 2024-11-19 RX ADMIN — CEFAZOLIN SODIUM 1000 MG: 1 SOLUTION INTRAVENOUS at 08:41

## 2024-11-19 RX ADMIN — INSULIN LISPRO 2 UNITS: 100 INJECTION, SOLUTION INTRAVENOUS; SUBCUTANEOUS at 08:00

## 2024-11-19 RX ADMIN — INSULIN LISPRO 2 UNITS: 100 INJECTION, SOLUTION INTRAVENOUS; SUBCUTANEOUS at 16:45

## 2024-11-19 RX ADMIN — SODIUM CHLORIDE 1000 ML: 0.9 INJECTION, SOLUTION INTRAVENOUS at 09:33

## 2024-11-19 RX ADMIN — HYDROMORPHONE HYDROCHLORIDE 0.5 MG: 1 INJECTION, SOLUTION INTRAMUSCULAR; INTRAVENOUS; SUBCUTANEOUS at 02:49

## 2024-11-19 RX ADMIN — OXYCODONE HYDROCHLORIDE 10 MG: 10 TABLET ORAL at 01:47

## 2024-11-19 RX ADMIN — FONDAPARINUX SODIUM 2.5 MG: 2.5 INJECTION SUBCUTANEOUS at 20:00

## 2024-11-19 RX ADMIN — DIPHENHYDRAMINE HYDROCHLORIDE 25 MG: 25 TABLET ORAL at 02:13

## 2024-11-19 RX ADMIN — METFORMIN HYDROCHLORIDE 500 MG: 500 TABLET ORAL at 08:41

## 2024-11-19 RX ADMIN — FOLIC ACID 1 MG: 1 TABLET ORAL at 08:41

## 2024-11-19 RX ADMIN — OXYCODONE HYDROCHLORIDE 10 MG: 10 TABLET ORAL at 16:02

## 2024-11-19 RX ADMIN — SODIUM CHLORIDE 100 ML/HR: 0.9 INJECTION, SOLUTION INTRAVENOUS at 18:07

## 2024-11-19 RX ADMIN — LISINOPRIL 5 MG: 5 TABLET ORAL at 08:45

## 2024-11-19 RX ADMIN — FERROUS SULFATE TAB 325 MG (65 MG ELEMENTAL FE) 325 MG: 325 (65 FE) TAB at 16:04

## 2024-11-19 RX ADMIN — DOCUSATE SODIUM 100 MG: 100 CAPSULE, LIQUID FILLED ORAL at 08:41

## 2024-11-19 RX ADMIN — FERROUS SULFATE TAB 325 MG (65 MG ELEMENTAL FE) 325 MG: 325 (65 FE) TAB at 08:41

## 2024-11-19 RX ADMIN — OXYCODONE HYDROCHLORIDE AND ACETAMINOPHEN 500 MG: 500 TABLET ORAL at 17:25

## 2024-11-19 NOTE — CONSULTS
Consultation - Hospitalist   Name: Robert Benson 64 y.o. male I MRN: [id number]  Unit/Bed#: -01 I Date of Admission: 11/18/2024   Date of Service: 11/19/2024 I Hospital Day: 0   Inpatient consult to Internal Medicine  Consult performed by: Wang Bach PA-C  Consult ordered by: Drake Vasquez PA-C        Physician Requesting Evaluation: Ehsan Rodriguez DO   Reason for Evaluation / Principal Problem:     Assessment & Plan  Hyponatremia  Sodium of 133 on outpatient labs. Sodium of 129 this morning.   Patient with history of SIADH and follows with Dr. Edward  Patient received IV fluids in the OR and then earlier today for hypotension  Will repeat BMP now  Continue home regimen of sodium chloride 1 g every other day  Consider Nephrology consult if sodium is trending down  Primary osteoarthritis of left knee  S/P left total knee replacement on 11/18/2024 by Dr. Rodriguez  Pain control by primary service  Arixtra for DVT prophylaxis  PT/OT  CM for dispo  Diabetes mellitus, type II (HCC)  Lab Results   Component Value Date    HGBA1C 6.8 (H) 10/17/2024       Recent Labs     11/18/24  1637 11/18/24 2023 11/19/24  0719 11/19/24  1145   POCGLU 200* 246* 151* 133       Blood Sugar Average: Last 72 hrs:  (P) 170    Continue metformin  ISS with accu checks  Monitor blood sugars and adjust insulin as needed  COPD (chronic obstructive pulmonary disease) (HCC)  Not in acute exacerbation  Respiratory protocol  CAD (coronary artery disease)  Continue home regimen of Lipitor, tricor, and lisinopril  ABLA (acute blood loss anemia)  In the setting of left total knee replacement  Hgb of 12.9 on outpatient labs  Hgb of 10.9 this morning  Continue to montior  I have discussed the above management plan in detail with the primary service.       VTE Pharmacologic Prophylaxis:   Moderate Risk (Score 3-4) - Pharmacological DVT Prophylaxis Ordered: other medication Arixtra .  Code Status: No Order   Discussion with family:  Updated  (wife) at bedside.        History of Present Illness   Chief Complaint: left knee pain    Robert Benson is a 64 y.o. male with a PMH of COPD, SIADH, CAD who presented for an elective left total knee replacement. Our service was consulted for medical management. The patient presented to orthopedic office with complaints of left knee pain that affected is ability to walk. Imaging was consistent with advanced arthritic changes. The patient any injury/trauma to the leg. He initially tried conservation management however he continued to have the patient. This prompted him to opted for surgery with a total knee replacement. The patient reports some pain since surgery. He also reported dizziness earlier today when noted to have his blood pressure drop into the 90's. Blood pressure improved and dizziness resolved with IV fluids.     Review of Systems   Musculoskeletal:  Positive for gait problem.        Left knee pain   Neurological:  Positive for dizziness.       Historical Information   Past Medical History:   Diagnosis Date    Bilateral flank pain 05/11/2023    CAD (coronary artery disease)     nonobstructive RCA via CT 2013    CKD (chronic kidney disease)     Colon polyp     COPD (chronic obstructive pulmonary disease) ([facility])     CPAP (continuous positive airway pressure) dependence     Diabetes mellitus (HCC)     GERD (gastroesophageal reflux disease)     Heart murmur     Hyperlipidemia     Hypertension     PAF (paroxysmal atrial fibrillation) ([facility])     PONV (postoperative nausea and vomiting)     Sleep apnea     cpap    Wears hearing aid      Past Surgical History:   Procedure Laterality Date    APPENDECTOMY N/A     COLONOSCOPY      JOINT REPLACEMENT Right 2014    Knee    MYOTOMY HELLER LAPAROSCOPIC  2018    [employer]    ME ARTHRP KNE CONDYLE&PLATU MEDIAL&LAT COMPARTMENTS Left 11/18/2024    Procedure: ARTHROPLASTY KNEE TOTAL;  Surgeon: Ehsan Rodriguez DO;  Location: [facility];  Service:  Orthopedics    STOMACH SURGERY       Social History     Tobacco Use    Smoking status: Some Days     Types: Cigars     Passive exposure: Current    Smokeless tobacco: Never    Tobacco comments:     Cigars 5/weeks   Vaping Use    Vaping status: Never Used   Substance and Sexual Activity    Alcohol use: Not Currently     Comment: rarely    Drug use: Never    Sexual activity: Not Currently     E-Cigarette/Vaping    E-Cigarette Use Never User      E-Cigarette/Vaping Substances    Nicotine No     THC No     CBD No     Flavoring No     Other No     Unknown No      Family history non-contributory  Social History:  Marital Status: /Civil Union   Occupation: unknown  Patient Pre-hospital Living Situation: Home  Patient Pre-hospital Level of Mobility: walks  Patient Pre-hospital Diet Restrictions: DM    Meds/Allergies   I have reviewed home medications using recent Epic encounter.  Prior to Admission medications    Medication Sig Start Date End Date Taking? Authorizing Provider   ascorbic acid (VITAMIN C) 500 MG tablet Take 1 tablet (500 mg total) by mouth 2 (two) times a day Do not start before October 17, 2024. 10/17/24 12/16/24 Yes Drake Vasquez PA-C   aspirin 81 mg chewable tablet Chew 81 mg daily   Yes Historical Provider, MD   atorvastatin (LIPITOR) 40 mg tablet Take 1 tablet (40 mg total) by mouth every evening 1/12/24  Yes Zack Arndt MD   esomeprazole (NexIUM) 40 MG capsule Take 1 capsule (40 mg total) by mouth in the morning 12/20/23  Yes Srinivas Esquivel MD   fenofibrate (TRICOR) 145 mg tablet Take 1 tablet (145 mg total) by mouth daily 7/18/24  Yes Zack Arndt MD   ferrous sulfate 324 (65 Fe) mg Take 1 tablet (324 mg total) by mouth 2 (two) times a day before meals Do not start before October 17, 2024. 10/17/24 12/16/24 Yes Drake Vasquez PA-C   folic acid (FOLVITE) 1 mg tablet Take 1 tablet (1 mg total) by mouth daily Do not start before October 17, 2024. 10/17/24 12/16/24 Yes  Drake Vasquez PA-C   Jardiance 10 MG TABS tablet TAKE 1 TABLET BY MOUTH EVERY DAY 8/7/24  Yes Inna Edward DO   lisinopril (ZESTRIL) 5 mg tablet Take 1 tablet (5 mg total) by mouth daily 1/12/24  Yes Zack Arndt MD   metFORMIN (GLUCOPHAGE) 500 mg tablet Take 1 tablet (500 mg total) by mouth daily with breakfast 5/24/24  Yes Inna Edward DO   Multiple Vitamins-Minerals (multivitamin with minerals) tablet Take 1 tablet by mouth daily Do not start before October 17, 2024. 10/17/24 12/16/24 Yes Drake Vasquez PA-C   nicotine polacrilex (Nicotine Mini) 2 MG lozenge Apply 1 lozenge (2 mg total) to the mouth or throat as needed for smoking cessation 10/29/24  Yes Srinivas Esquivel MD   sodium chloride 1 g tablet TAKE 1 TABLET (1 G TOTAL) BY MOUTH EVERY OTHER DAY 10/28/24  Yes Inna Edward DO   amoxicillin (AMOXIL) 500 mg capsule Take 2,000 mg by mouth as needed 1 hour prior to dental appointment  Patient not taking: Reported on 10/15/2024 7/8/24   Historical Provider, MD   fondaparinux (ARIXTRA) 2.5 mg/0.5 mL Inject 2.5 mg under the skin every 24 hours for 14 days 11/19/24 12/3/24  Drake Vasquez PA-C     No Known Allergies    Objective :  Temp:  [97.2 °F (36.2 °C)-99.8 °F (37.7 °C)] 99.8 °F (37.7 °C)  HR:  [] 110  BP: ()/(47-83) 160/83  Resp:  [16-20] 18  SpO2:  [93 %-97 %] 97 %  O2 Device: None (Room air)    Physical Exam  Vitals and nursing note reviewed.   Constitutional:       General: He is awake.      Appearance: Normal appearance. He is morbidly obese.   HENT:      Head: Normocephalic and atraumatic.   Cardiovascular:      Rate and Rhythm: Normal rate and regular rhythm.      Heart sounds: Normal heart sounds.   Pulmonary:      Effort: Pulmonary effort is normal.      Breath sounds: Normal breath sounds.   Abdominal:      Palpations: Abdomen is soft.      Tenderness: There is no abdominal tenderness.   Musculoskeletal:      Comments: Left knee with surgical  dressing in place   Skin:     General: Skin is warm and dry.   Neurological:      General: No focal deficit present.      Mental Status: He is alert and oriented to person, place, and time.   Psychiatric:         Attention and Perception: Attention normal.         Mood and Affect: Mood normal.         Speech: Speech normal.         Behavior: Behavior is cooperative.          Lines/Drains:  Lines/Drains/Airways       Active Status       Name Placement date Placement time Site Days    Closed/Suction Drain Left;Posterior Thigh Accordion 11/18/24  1000  Thigh  1                          Lab Results: I have reviewed the following results:  Results from last 7 days   Lab Units 11/19/24  0455   WBC Thousand/uL 10.40*   HEMOGLOBIN g/dL 10.9*   HEMATOCRIT % 33.5*   PLATELETS Thousands/uL 230     Results from last 7 days   Lab Units 11/19/24  0455   SODIUM mmol/L 129*   POTASSIUM mmol/L 3.8   CHLORIDE mmol/L 97   CO2 mmol/L 24   BUN mg/dL 17   CREATININE mg/dL 1.31*   ANION GAP mmol/L 8   CALCIUM mg/dL 8.8   GLUCOSE RANDOM mg/dL 134         Results from last 7 days   Lab Units 11/19/24  1145 11/19/24  0719 11/18/24 2023 11/18/24  1637 11/18/24  0722   POC GLUCOSE mg/dl 133 151* 246* 200* 120     Lab Results   Component Value Date    HGBA1C 6.8 (H) 10/17/2024    HGBA1C 6.9 (H) 05/02/2024    HGBA1C 6.5 10/17/2023           Imaging Results Review: No pertinent imaging studies reviewed.  Other Study Results Review: No additional pertinent studies reviewed.    Administrative Statements   I have spent a total time of 65 minutes in caring for this patient on the day of the visit/encounter including Documenting in the medical record, Reviewing / ordering tests, medicine, procedures  , Obtaining or reviewing history  , and Communicating with other healthcare professionals .    ** Please Note: This note has been constructed using a voice recognition system. **

## 2024-11-19 NOTE — ASSESSMENT & PLAN NOTE
Lab Results   Component Value Date    HGBA1C 6.8 (H) 10/17/2024       Recent Labs     11/18/24 2023 11/19/24  0719 11/19/24  1145 11/19/24  1626   POCGLU 246* 151* 133 161*       Blood Sugar Average: Last 72 hrs:  (P) 168.5

## 2024-11-19 NOTE — ASSESSMENT & PLAN NOTE
Lab Results   Component Value Date    HGBA1C 6.8 (H) 10/17/2024       Recent Labs     11/18/24  1637 11/18/24 2023 11/19/24  0719 11/19/24  1145   POCGLU 200* 246* 151* 133       Blood Sugar Average: Last 72 hrs:  (P) 170    Continue metformin  ISS with accu checks  Monitor blood sugars and adjust insulin as needed

## 2024-11-19 NOTE — PLAN OF CARE
Problem: PHYSICAL THERAPY ADULT  Goal: Performs mobility at highest level of function for planned discharge setting.  See evaluation for individualized goals.  Description: Treatment/Interventions: Functional transfer training, Therapeutic exercise, Endurance training, Gait training, Bed mobility, Equipment eval/education, Elevations  Equipment Recommended: Walker       See flowsheet documentation for full assessment, interventions and recommendations.  11/19/2024 1512 by Archana Hillman PTA  Outcome: Progressing  Note: Prognosis: Good  Problem List: Decreased strength, Decreased range of motion, Decreased endurance, Impaired balance, Decreased mobility, Decreased safety awareness, Orthopedic restrictions, Pain  Assessment: Pt seen for PT treatment session this date with interventions consisting of gait training w/ emphasis on improving pt's ability to ambulate level surfaces x 20 ft x1, 5 ft x1 with CGA provided by therapist with RW, therapeutic activity consisting of training: bed mobility, supine<>sit transfers, and sit<>stand transfers, and navigating 5 stairs w/ B handrail with step to pattern with min A and SBA of second staff for safety . Pt agreeable to PT treatment session upon arrival, pt found supine in bed w/ HOB elevated, responsive. In comparison to previous session, pt with improvements in distance ambulated and ability to navigate stairs . Post session: pt returned BTB, bed alarm engaged, all needs in reach, and RN notified of session findings/recommendations. Continue to recommend Level III (Minimum Resource Intensity) at time of d/c in order to maximize pt's functional independence and safety w/ mobility. Pt continues to be functioning below baseline level. PT will continue to see pt during current hospitalization in order to address the deficits listed above and provide interventions consistent w/ POC in effort to achieve STGs.    Archana Hillman PTA  Barriers to Discharge: Inaccessible home  environment     Rehab Resource Intensity Level, PT: III (Minimum Resource Intensity)    See flowsheet documentation for full assessment.     11/19/2024 1021 by Archana Hillman PTA  Outcome: Not Progressing  Note: Prognosis: Good  Problem List: Decreased strength, Decreased range of motion, Impaired balance, Decreased mobility, Pain, Orthopedic restrictions  Assessment: Pt seen for PT treatment session this date with interventions consisting of gait training w/ emphasis on improving pt's ability to ambulate level surfaces x 2 ft x1 with CGA provided by therapist with RW, Therapeutic exercise consisting of: AROM 2 sets of 10 reps B LE in sitting position, and therapeutic activity consisting of training: bed mobility, supine<>sit transfers, sit<>stand transfers, and stand pivot transfers towards L direction. Pt agreeable to PT treatment session upon arrival, pt found supine in bed w/ HOB elevated, A&O x 4 and responsive. In comparison to previous session, pt with no improvements as evidenced by treatment limited by decreased BP . Post session: pt returned back to recliner, chair alarm engaged, all needs in reach, and RN notified of session findings/recommendations. Continue to recommend Level III (Minimum Resource Intensity) at time of d/c in order to maximize pt's functional independence and safety w/ mobility. Pt continues to be functioning below baseline level. PT will continue to see pt during current hospitalization in order to address the deficits listed above and provide interventions consistent w/ POC in effort to achieve STGs.    Archana Hillman PTA  Barriers to Discharge: Inaccessible home environment     Rehab Resource Intensity Level, PT: III (Minimum Resource Intensity)    See flowsheet documentation for full assessment.

## 2024-11-19 NOTE — PLAN OF CARE
Problem: PHYSICAL THERAPY ADULT  Goal: Performs mobility at highest level of function for planned discharge setting.  See evaluation for individualized goals.  Description: Treatment/Interventions: Functional transfer training, Therapeutic exercise, Endurance training, Gait training, Bed mobility, Equipment eval/education, Elevations  Equipment Recommended: Walker       See flowsheet documentation for full assessment, interventions and recommendations.  Outcome: Not Progressing  Note: Prognosis: Good  Problem List: Decreased strength, Decreased range of motion, Impaired balance, Decreased mobility, Pain, Orthopedic restrictions  Assessment: Pt seen for PT treatment session this date with interventions consisting of gait training w/ emphasis on improving pt's ability to ambulate level surfaces x 2 ft x1 with CGA provided by therapist with RW, Therapeutic exercise consisting of: AROM 2 sets of 10 reps B LE in sitting position, and therapeutic activity consisting of training: bed mobility, supine<>sit transfers, sit<>stand transfers, and stand pivot transfers towards L direction. Pt agreeable to PT treatment session upon arrival, pt found supine in bed w/ HOB elevated, A&O x 4 and responsive. In comparison to previous session, pt with no improvements as evidenced by treatment limited by decreased BP . Post session: pt returned back to recliner, chair alarm engaged, all needs in reach, and RN notified of session findings/recommendations. Continue to recommend Level III (Minimum Resource Intensity) at time of d/c in order to maximize pt's functional independence and safety w/ mobility. Pt continues to be functioning below baseline level. PT will continue to see pt during current hospitalization in order to address the deficits listed above and provide interventions consistent w/ POC in effort to achieve STGs.    Archana Hillman, PTA  Barriers to Discharge: Inaccessible home environment     Rehab Resource Intensity  Level, PT: III (Minimum Resource Intensity)    See flowsheet documentation for full assessment.

## 2024-11-19 NOTE — QUICK NOTE
This patient underwent a procedure not on the inpatient only list and therefore is subject to the 2 midnight benchmark. Accordingly, in my judgement, the patient will require at least 2 midnights in the hospital receiving acute medical care. The patient is noted to have comorbid conditions including ambulatory dysfunction, limited endurance and hypotension which will require management in the darin-operative period prior to safe discharge. As such the patient will require acute care beyond the usual and routine recovery period for the procedure alone and is therefore appropriate for inpatient admission.

## 2024-11-19 NOTE — ASSESSMENT & PLAN NOTE
In the setting of left total knee replacement  Hgb of 12.9 on outpatient labs  Hgb of 10.9 this morning  Continue to montior

## 2024-11-19 NOTE — CONSULTS
Consultation - Nephrology   Name: Robert Benson 64 y.o. male I MRN: [id number]  Unit/Bed#: -01 I Date of Admission: 11/18/2024   Date of Service: 11/19/2024 I Hospital Day: 0   Inpatient consult to Nephrology  Consult performed by: DASHA Amaya  Consult ordered by: Wang Bach PA-C        Physician Requesting Evaluation: Ehsan Rodriguez DO   Reason for Evaluation / Principal Problem: Hyponatremia    Assessment & Plan  Primary osteoarthritis of left knee    Diabetes mellitus, type II (HCC)  Lab Results   Component Value Date    HGBA1C 6.8 (H) 10/17/2024       Recent Labs     11/18/24  2023 11/19/24  0719 11/19/24  1145 11/19/24  1626   POCGLU 246* 151* 133 161*       Blood Sugar Average: Last 72 hrs:  (P) 168.5    Hyponatremia  Sodium 126 mmol/L 11/19 at 1604, decreased from 129 at 0455 11/19.   Likely SIADH induced with postoperative pain. Denies nausea or vomiting.  S/P 2L normal saline bolus on 11/19.  Encourage adequate dietary solute including high-protein diet. Will start Ensure, patient states his appetite has been poor but states he will drink ensure to boost protein intake.   Will check urine studies although usefulness may be limited with recent IV fluids.  Patient states he has difficulty maintaining fluid restriction due to achalasia.  Also notes decreased urine output.  Will attempt to maintain oral fluid restriction of less than 1800 mL/day as best patient can tolerate.  Will give fixed and timed normal saline 1L over 10 hours.   Increase sodium chloride 1 g tablet to twice daily with meals for now (was previously 1 g every other day).  Recheck labs at 10 pm and with AM labs.     PONV (postoperative nausea and vomiting)  Continue management as per primary team. Patient denies nausea or vomiting.   COPD (chronic obstructive pulmonary disease) (HCC)    CAD (coronary artery disease)    Sleep apnea    ABLA (acute blood loss anemia)  Hemoglobin 10.9 g/dL. Will continue to  monitor.         I have reviewed the nephrology recommendations including fluid restriction, sodium chloride and normal saline bolus with hospitalist, and we are in agreement with renal plan including the information outlined above.     History of Present Illness   Robert Benson is a 64 y.o. male who was admitted to [facility] 11/18 for elective left knee replacement surgery.  PMH includes CKD, CAD, COPD, RAJNI with CPAP dependence, DM, GERD, HTN, A-fib and heart murmur. A renal consultation is requested today for assistance in the management of hyponatremia.     Patient examined sitting out of bed to chair with wife at bedside. Denies nausea or vomiting. States he is tired, in pain and grumpy. Discussed importance of pain management and taking pain meds prior to being in severe pain to maximize benefits of pain medication and decrease effect of SIADH on hyponatremia.     Review of Systems   Constitutional:  Positive for activity change, appetite change and fatigue. Negative for chills and fever.   HENT:  Negative for ear pain and sore throat.    Eyes:  Negative for pain and visual disturbance.   Respiratory:  Negative for cough and shortness of breath.    Cardiovascular:  Negative for chest pain, palpitations and leg swelling.   Gastrointestinal:  Negative for abdominal pain, constipation, diarrhea, nausea and vomiting.   Genitourinary:  Positive for decreased urine volume. Negative for difficulty urinating, dysuria, flank pain, frequency and hematuria.        Decreased urinary amount   Musculoskeletal:  Negative for arthralgias and back pain.   Skin:  Negative for color change and rash.   Neurological:  Positive for dizziness and light-headedness. Negative for seizures, syncope and headaches.        Dizziness and lightheadedness earlier today, improved after IVF per patient   All other systems reviewed and are negative.    I have reviewed the patient's PMH, PSH, Social History, Family History, Meds, and  Allergies  Historical Information   Past Medical History:   Diagnosis Date    Bilateral flank pain 05/11/2023    CAD (coronary artery disease)     nonobstructive RCA via CT 2013    CKD (chronic kidney disease)     Colon polyp     COPD (chronic obstructive pulmonary disease) (HCC)     CPAP (continuous positive airway pressure) dependence     Diabetes mellitus (HCC)     GERD (gastroesophageal reflux disease)     Heart murmur     Hyperlipidemia     Hypertension     PAF (paroxysmal atrial fibrillation) (HCC)     PONV (postoperative nausea and vomiting)     Sleep apnea     cpap    Wears hearing aid      Past Surgical History:   Procedure Laterality Date    APPENDECTOMY N/A     COLONOSCOPY      JOINT REPLACEMENT Right 2014    Knee    MYOTOMY HELLER LAPAROSCOPIC  2018    Buddhist    MI ARTHRP KNE CONDYLE&PLATU MEDIAL&LAT COMPARTMENTS Left 11/18/2024    Procedure: ARTHROPLASTY KNEE TOTAL;  Surgeon: Ehsan Rodriguez DO;  Location: [facility];  Service: Orthopedics    STOMACH SURGERY       Social History     Tobacco Use    Smoking status: Some Days     Types: Cigars     Passive exposure: Current    Smokeless tobacco: Never    Tobacco comments:     Cigars 5/weeks   Vaping Use    Vaping status: Never Used   Substance and Sexual Activity    Alcohol use: Not Currently     Comment: rarely    Drug use: Never    Sexual activity: Not Currently     E-Cigarette/Vaping    E-Cigarette Use Never User      E-Cigarette/Vaping Substances    Nicotine No     THC No     CBD No     Flavoring No     Other No     Unknown No          Objective :  Temp:  [97.4 °F (36.3 °C)-99.8 °F (37.7 °C)] 99.8 °F (37.7 °C)  HR:  [] 110  BP: ()/(47-83) 160/83  Resp:  [16-20] 18  SpO2:  [93 %-97 %] 97 %  O2 Device: None (Room air)    Current Weight: Weight - Scale: 114 kg (252 lb)  First Weight: Weight - Scale: 114 kg (252 lb)  I/O         11/17 0701  11/18 0700 11/18 0701  11/19 0700 11/19 0701  11/20 0700    P.O.  240 500    I.V. (mL/kg)  1214.7  (10.7)     IV Piggyback  150     Total Intake(mL/kg)  1604.7 (14.1) 500 (4.4)    Urine (mL/kg/hr)  2075 (0.8) 275 (0.2)    Drains  450     Blood  10     Total Output  2535 275    Net  -930.3 +225                 Physical Exam  Vitals and nursing note reviewed.   Constitutional:       General: He is not in acute distress.     Appearance: He is well-developed. He is obese. He is ill-appearing.   HENT:      Head: Normocephalic and atraumatic.      Right Ear: External ear normal.      Left Ear: External ear normal.      Nose: Nose normal.      Mouth/Throat:      Mouth: Mucous membranes are moist.      Pharynx: Oropharynx is clear.   Eyes:      Extraocular Movements: Extraocular movements intact.      Conjunctiva/sclera: Conjunctivae normal.      Pupils: Pupils are equal, round, and reactive to light.   Cardiovascular:      Rate and Rhythm: Normal rate and regular rhythm.      Heart sounds: Normal heart sounds. No murmur heard.  Pulmonary:      Effort: Pulmonary effort is normal. No respiratory distress.      Breath sounds: Normal breath sounds.      Comments: Decreased posterior breath sounds  Abdominal:      Palpations: Abdomen is soft.      Tenderness: There is no abdominal tenderness.   Musculoskeletal:         General: No swelling.      Cervical back: Neck supple.      Right lower leg: No edema.      Left lower leg: No edema.   Skin:     General: Skin is warm and dry.      Capillary Refill: Capillary refill takes less than 2 seconds.   Neurological:      General: No focal deficit present.      Mental Status: He is alert and oriented to person, place, and time.   Psychiatric:         Mood and Affect: Mood normal.         Behavior: Behavior normal.         Medications:    Current Facility-Administered Medications:     acetaminophen (TYLENOL) tablet 650 mg, 650 mg, Oral, Q4H PRN, Drake Vasquez PA-C    aluminum-magnesium hydroxide-simethicone (MAALOX) oral suspension 30 mL, 30 mL, Oral, Q6H PRN, Drake Beaver  RADHA VasquezC    ascorbic acid (VITAMIN C) tablet 500 mg, 500 mg, Oral, BID, Drake Vasquez, PA-C, 500 mg at 11/19/24 0841    atorvastatin (LIPITOR) tablet 40 mg, 40 mg, Oral, QPM, Drake Vasquez, PA-C, 40 mg at 11/18/24 1738    docusate sodium (COLACE) capsule 100 mg, 100 mg, Oral, BID, Drake Vasquez PA-C, 100 mg at 11/19/24 0841    fenofibrate (TRICOR) tablet 145 mg, 145 mg, Oral, Daily, Drake Vasquez PA-C, 145 mg at 11/19/24 0841    ferrous sulfate tablet 325 mg, 325 mg, Oral, BID With Meals, Drake Vasquez, PA-C, 325 mg at 11/19/24 1604    folic acid (FOLVITE) tablet 1 mg, 1 mg, Oral, Daily, Drake Vasquez PA-C, 1 mg at 11/19/24 0841    fondaparinux (ARIXTRA) subcutaneous injection 2.5 mg, 2.5 mg, Subcutaneous, Q24H, Drake Vasquez PA-C, 2.5 mg at 11/18/24 1908    HYDROmorphone (DILAUDID) injection 0.5 mg, 0.5 mg, Intravenous, Q3H PRN, Drake Vasquez PA-C, 0.5 mg at 11/19/24 0249    insulin lispro (HumALOG/ADMELOG) 100 units/mL subcutaneous injection 2-12 Units, 2-12 Units, Subcutaneous, TID AC, 2 Units at 11/19/24 1645 **AND** Fingerstick Glucose (POCT), , , TID AC, Drake Vasquez PA-C    insulin lispro (HumALOG/ADMELOG) 100 units/mL subcutaneous injection 2-12 Units, 2-12 Units, Subcutaneous, HS, Drake Vasquez, PA-C, 4 Units at 11/18/24 2221    lisinopril (ZESTRIL) tablet 5 mg, 5 mg, Oral, Daily, ABRAHAM Duval-C, 5 mg at 11/19/24 0845    metFORMIN (GLUCOPHAGE) tablet 500 mg, 500 mg, Oral, Daily With Breakfast, Drake Vasquez PA-C, 500 mg at 11/19/24 0841    multivitamin-minerals (CENTRUM) tablet 1 tablet, 1 tablet, Oral, Daily, Drake Vasquez PA-C, 1 tablet at 11/19/24 0841    ondansetron (ZOFRAN) injection 4 mg, 4 mg, Intravenous, Q6H PRN, Drake Vasquez PA-C    oxyCODONE (ROXICODONE) immediate release tablet 10 mg, 10 mg, Oral, Q6H PRN, Drake Vasquez PA-C, 10 mg at 11/19/24 1602     "oxyCODONE (ROXICODONE) IR tablet 5 mg, 5 mg, Oral, Q4H PRN, Drake Vasquez PA-C, 5 mg at 11/18/24 1247    sodium chloride tablet 1 g, 1 g, Oral, Every Other Day, Drake Vasquez PA-C, 1 g at 11/18/24 1503      Lab Results: I have reviewed the following results:  Results from last 7 days   Lab Units 11/19/24  1606 11/19/24  0455   WBC Thousand/uL  --  10.40*   HEMOGLOBIN g/dL  --  10.9*   HEMATOCRIT %  --  33.5*   PLATELETS Thousands/uL  --  230   POTASSIUM mmol/L 3.9 3.8   CHLORIDE mmol/L 95* 97   CO2 mmol/L 22 24   BUN mg/dL 17 17   CREATININE mg/dL 1.36* 1.31*   CALCIUM mg/dL 9.0 8.8       Administrative Statements     Portions of the record may have been created with voice recognition software. Occasional wrong word or \"sound a like\" substitutions may have occurred due to the inherent limitations of voice recognition software. Read the chart carefully and recognize, using context, where substitutions have occurred.If you have any questions, please contact the dictating provider.  "

## 2024-11-19 NOTE — PLAN OF CARE
Problem: PAIN - ADULT  Goal: Verbalizes/displays adequate comfort level or baseline comfort level  Description: Interventions:  - Encourage patient to monitor pain and request assistance  - Assess pain using appropriate pain scale  - Administer analgesics based on type and severity of pain and evaluate response  - Implement non-pharmacological measures as appropriate and evaluate response  - Consider cultural and social influences on pain and pain management  - Notify physician/advanced practitioner if interventions unsuccessful or patient reports new pain  Outcome: Progressing     Problem: INFECTION - ADULT  Goal: Absence or prevention of progression during hospitalization  Description: INTERVENTIONS:  - Assess and monitor for signs and symptoms of infection  - Monitor lab/diagnostic results  - Monitor all insertion sites, i.e. indwelling lines, tubes, and drains  - Monitor endotracheal if appropriate and nasal secretions for changes in amount and color  - [place] appropriate cooling/warming therapies per order  - Administer medications as ordered  - Instruct and encourage patient and family to use good hand hygiene technique  - Identify and instruct in appropriate isolation precautions for identified infection/condition  Outcome: Progressing

## 2024-11-19 NOTE — ASSESSMENT & PLAN NOTE
S/P left total knee replacement on 11/18/2024 by Dr. Rodriguez  Pain control by primary service  Arixtra for DVT prophylaxis  PT/OT  CM for dispo

## 2024-11-19 NOTE — PHYSICAL THERAPY NOTE
PHYSICAL THERAPY TREATMENT NOTE  NAME:  Robert Benson  DATE: 11/19/24    Length Of Stay: 0  Performed at least 2 patient identifiers during session: Name and ID bracelet    TREATMENT:      11/19/24 1313   PT Last Visit   PT Visit Date 11/19/24   Note Type   Note Type BID visit/treatment   Pain Assessment   Pain Assessment Tool 0-10   Pain Score 8   Pain Location/Orientation Orientation: Left;Location: Knee   Pain Onset/Description Onset: Ongoing;Descriptor: Throbbing   Effect of Pain on Daily Activities limits mobility   Patient's Stated Pain Goal No pain   Hospital Pain Intervention(s) Medication (See MAR);Cold applied;Repositioned;Ambulation/increased activity;Emotional support;Rest   Multiple Pain Sites No   Restrictions/Precautions   Weight Bearing Precautions Per Order Yes   LLE Weight Bearing Per Order FWB   Other Precautions Chair Alarm;Bed Alarm;Fall Risk;Pain   General   Chart Reviewed Yes   Family/Caregiver Present No   Cognition   Overall Cognitive Status WFL   Arousal/Participation Responsive;Cooperative   Attention Within functional limits   Orientation Level Oriented X4   Memory Within functional limits   Following Commands Follows all commands and directions without difficulty   Comments pt agreeable to PT treatment   Bed Mobility   Supine to Sit 5  Supervision   Additional items Assist x 1;HOB elevated;Bedrails;Increased time required;Verbal cues   Sit to Supine 5  Supervision   Additional items Leg ;Increased time required;Verbal cues;Assist x 1;Bedrails  (used sheet as leg )   Additional Comments verbal cues for proper bedrail utilization and safe technique   Transfers   Sit to Stand   (CGA)   Additional items Assist x 1;Bedrails;Increased time required;Verbal cues   Stand to Sit   (CGA)   Additional items Assist x 1;Bedrails;Increased time required;Armrests;Verbal cues   Stand pivot   (CGA)   Additional items Assist x 1;Increased time required;Verbal cues   Additional Comments RW  used , verbal cues for proper hand placement and positioning of L LE   Ambulation/Elevation   Gait pattern Improper Weight shift;Wide IRVING;Decreased foot clearance;Decreased L stance;Short stride;Step to;Excessively slow   Gait Assistance   (CGA)   Additional items Assist x 1;Verbal cues   Assistive Device Rolling walker   Distance 20 ft x1, 5 ft x1   Stair Management Assistance 4  Minimal assist   Additional items Assist x 1;Verbal cues;Tactile cues  (SBA of 2nd staff for safety)   Stair Management Technique Two rails;Step to pattern;Foreward   Number of Stairs 5   Ambulation/Elevation Additional Comments cues for proper sequencing and RW navigation   Balance   Static Sitting Good   Dynamic Sitting Fair +   Static Standing Fair   Dynamic Standing Fair -   Ambulatory Fair -   Endurance Deficit   Endurance Deficit Yes   Endurance Deficit Description reports dizziness post treatment and returned back to bed   Activity Tolerance   Activity Tolerance Patient limited by pain;Patient limited by fatigue   Nurse Made Aware RN made aware of outcomes   Assessment   Prognosis Good   Problem List Decreased strength;Decreased range of motion;Decreased endurance;Impaired balance;Decreased mobility;Decreased safety awareness;Orthopedic restrictions;Pain   Assessment Pt seen for PT treatment session this date with interventions consisting of gait training w/ emphasis on improving pt's ability to ambulate level surfaces x 20 ft x1, 5 ft x1 with CGA provided by therapist with RW, therapeutic activity consisting of training: bed mobility, supine<>sit transfers, and sit<>stand transfers, and navigating 5 stairs w/ B handrail with step to pattern with min A and SBA of second staff for safety . Pt agreeable to PT treatment session upon arrival, pt found supine in bed w/ HOB elevated, responsive. In comparison to previous session, pt with improvements in distance ambulated and ability to navigate stairs . Post session: pt returned BTB, bed  alarm engaged, all needs in reach, and RN notified of session findings/recommendations. Continue to recommend Level III (Minimum Resource Intensity) at time of d/c in order to maximize pt's functional independence and safety w/ mobility. Pt continues to be functioning below baseline level. PT will continue to see pt during current hospitalization in order to address the deficits listed above and provide interventions consistent w/ POC in effort to achieve STGs.    Archana Hillman, PTA   Barriers to Discharge Inaccessible home environment   Goals   Patient Goals to go home   LTG Expiration Date 11/28/24   Plan   Treatment/Interventions Functional transfer training;LE strengthening/ROM;Therapeutic exercise;Endurance training;Elevations;Equipment eval/education;Bed mobility;Gait training;Compensatory technique education;Spoke to nursing   PT Frequency Twice a day   Discharge Recommendation   Rehab Resource Intensity Level, PT III (Minimum Resource Intensity)   Equipment Recommended Walker   Walker Package Recommended Wheeled walker   AM-PAC Basic Mobility Inpatient   Turning in Flat Bed Without Bedrails 3   Lying on Back to Sitting on Edge of Flat Bed Without Bedrails 3   Moving Bed to Chair 3   Standing Up From Chair Using Arms 3   Walk in Room 3   Climb 3-5 Stairs With Railing 3   Basic Mobility Inpatient Raw Score 18   Basic Mobility Standardized Score 41.05   [facility] Highest Level Of Mobility   -HLM Goal 6: Walk 10 steps or more   -HLM Achieved 6: Walk 10 steps or more   Education   Education Provided Other  (stair training)   Patient Demonstrates acceptance/verbal understanding   End of Consult   Patient Position at End of Consult Supine;Bed/Chair alarm activated;All needs within reach       The patient's AM-PAC Basic Mobility Inpatient Short Form Raw Score is 18. A Raw score of greater than 16 suggests the patient may benefit from discharge to home. Please also refer to the recommendation of the  Physical Therapist for safe discharge planning.      Archana Hillman, PTA,PTA

## 2024-11-19 NOTE — NURSING NOTE
Notified by Pt that pt was noted to be lightheaded and hypotensive. Drake ROSARIO, notified and aware. PRN IV fluids administered as per hypotension protocol. Vitals entered in EPIC. will continue to monitor.

## 2024-11-19 NOTE — PLAN OF CARE
Problem: PHYSICAL THERAPY ADULT  Goal: Performs mobility at highest level of function for planned discharge setting.  See evaluation for individualized goals.  Description: Treatment/Interventions: Functional transfer training, Therapeutic exercise, Endurance training, Gait training, Bed mobility, Equipment eval/education, Elevations  Equipment Recommended: Walker       See flowsheet documentation for full assessment, interventions and recommendations.  11/19/2024 1513 by Archana Hillman PTA  Outcome: Progressing  Note: Prognosis: Good  Problem List: Decreased strength, Decreased range of motion, Decreased endurance, Impaired balance, Decreased mobility, Decreased safety awareness, Orthopedic restrictions, Pain  Assessment: Pt seen for PT treatment session this date with interventions consisting of gait training w/ emphasis on improving pt's ability to ambulate level surfaces x 20 ft x1, 5 ft x1 with CGA provided by therapist with RW, therapeutic activity consisting of training: bed mobility, supine<>sit transfers, and sit<>stand transfers, and navigating 5 stairs w/ B handrail with step to pattern with min A and SBA of second staff for safety . Pt agreeable to PT treatment session upon arrival, pt found supine in bed w/ HOB elevated, responsive. In comparison to previous session, pt with improvements in distance ambulated and ability to navigate stairs . Post session: pt returned BTB, bed alarm engaged, all needs in reach, and RN notified of session findings/recommendations. Continue to recommend Level III (Minimum Resource Intensity) at time of d/c in order to maximize pt's functional independence and safety w/ mobility. Pt continues to be functioning below baseline level. PT will continue to see pt during current hospitalization in order to address the deficits listed above and provide interventions consistent w/ POC in effort to achieve STGs.    Archana Hillman PTA  Barriers to Discharge: Inaccessible home  environment     Rehab Resource Intensity Level, PT: III (Minimum Resource Intensity)    See flowsheet documentation for full assessment.     11/19/2024 1512 by Archana Hillman PTA  Outcome: Progressing  Note: Prognosis: Good  Problem List: Decreased strength, Decreased range of motion, Decreased endurance, Impaired balance, Decreased mobility, Decreased safety awareness, Orthopedic restrictions, Pain  Assessment: Pt seen for PT treatment session this date with interventions consisting of gait training w/ emphasis on improving pt's ability to ambulate level surfaces x 20 ft x1, 5 ft x1 with CGA provided by therapist with RW, therapeutic activity consisting of training: bed mobility, supine<>sit transfers, and sit<>stand transfers, and navigating 5 stairs w/ B handrail with step to pattern with min A and SBA of second staff for safety . Pt agreeable to PT treatment session upon arrival, pt found supine in bed w/ HOB elevated, responsive. In comparison to previous session, pt with improvements in distance ambulated and ability to navigate stairs . Post session: pt returned BTB, bed alarm engaged, all needs in reach, and RN notified of session findings/recommendations. Continue to recommend Level III (Minimum Resource Intensity) at time of d/c in order to maximize pt's functional independence and safety w/ mobility. Pt continues to be functioning below baseline level. PT will continue to see pt during current hospitalization in order to address the deficits listed above and provide interventions consistent w/ POC in effort to achieve STGs.    Archana Hillman PTA  Barriers to Discharge: Inaccessible home environment     Rehab Resource Intensity Level, PT: III (Minimum Resource Intensity)    See flowsheet documentation for full assessment.     11/19/2024 1021 by Archana Hillman PTA  Outcome: Not Progressing  Note: Prognosis: Good  Problem List: Decreased strength, Decreased range of motion, Impaired balance, Decreased  mobility, Pain, Orthopedic restrictions  Assessment: Pt seen for PT treatment session this date with interventions consisting of gait training w/ emphasis on improving pt's ability to ambulate level surfaces x 2 ft x1 with CGA provided by therapist with RW, Therapeutic exercise consisting of: AROM 2 sets of 10 reps B LE in sitting position, and therapeutic activity consisting of training: bed mobility, supine<>sit transfers, sit<>stand transfers, and stand pivot transfers towards L direction. Pt agreeable to PT treatment session upon arrival, pt found supine in bed w/ HOB elevated, A&O x 4 and responsive. In comparison to previous session, pt with no improvements as evidenced by treatment limited by decreased BP . Post session: pt returned back to recliner, chair alarm engaged, all needs in reach, and RN notified of session findings/recommendations. Continue to recommend Level III (Minimum Resource Intensity) at time of d/c in order to maximize pt's functional independence and safety w/ mobility. Pt continues to be functioning below baseline level. PT will continue to see pt during current hospitalization in order to address the deficits listed above and provide interventions consistent w/ POC in effort to achieve STGs.    Archana Hillman, PTA  Barriers to Discharge: Inaccessible home environment     Rehab Resource Intensity Level, PT: III (Minimum Resource Intensity)    See flowsheet documentation for full assessment.

## 2024-11-19 NOTE — ASSESSMENT & PLAN NOTE
Sodium of 133 on outpatient labs. Sodium of 129 this morning.   Patient with history of SIADH and follows with Dr. Edward  Patient received IV fluids in the OR and then earlier today for hypotension  Will repeat BMP now  Continue home regimen of sodium chloride 1 g every other day  Consider Nephrology consult if sodium is trending down

## 2024-11-19 NOTE — ASSESSMENT & PLAN NOTE
Sodium 126 mmol/L 11/19 at 1604, decreased from 129 at 0455 11/19.   Likely SIADH induced with postoperative pain. Denies nausea or vomiting.  S/P 2L normal saline bolus on 11/19.  Encourage adequate dietary solute including high-protein diet. Will start Ensure, patient states his appetite has been poor but states he will drink ensure to boost protein intake.   Will check urine studies although usefulness may be limited with recent IV fluids.  Patient states he has difficulty maintaining fluid restriction due to achalasia.  Also notes decreased urine output.  Will attempt to maintain oral fluid restriction of less than 1800 mL/day as best patient can tolerate.  Will give fixed and timed normal saline 1L over 10 hours.   Increase sodium chloride 1 g tablet to twice daily with meals for now (was previously 1 g every other day).  Recheck labs at 10 pm and with AM labs.

## 2024-11-19 NOTE — CASE MANAGEMENT
Case Management Assessment & Discharge Planning Note    Patient name Robert Benson  Location /-01 MRN [id number]  : 1960 Date 2024       Current Admission Date: 2024  Current Admission Diagnosis:Primary osteoarthritis of left knee   Patient Active Problem List    Diagnosis Date Noted Date Diagnosed    PONV (postoperative nausea and vomiting)      COPD (chronic obstructive pulmonary disease) ([facility])      CAD (coronary artery disease)      Sleep apnea      Primary osteoarthritis of left knee 11/15/2024     Obesity, morbid ([facility]) 2024     Pre-operative cardiovascular examination 10/15/2024     Chronic bronchitis, unspecified chronic bronchitis type ([facility]) 2024     Stage 3a chronic kidney disease ([facility]) 2024     Chronic kidney disease (CKD), stage II (mild) 2024     Microalbuminuria 2024     Family history of chronic ischemic heart disease 2024     Dyslipidemia 2024     Essential hypertension 10/17/2023     Diabetes mellitus due to underlying condition with diabetic neuropathy, without long-term current use of insulin ([facility]) 2023     Obesity (BMI 30.0-34.9) 2023     Paroxysmal A-fib ([facility]) 05/15/2023     Cholelithiasis 2023     Nausea 2023     Hyponatremia 2023     Achalasia 2022     Diabetes mellitus, type II ([facility]) 2022     BRCA2 negative 2017       LOS (days): 0  Geometric Mean LOS (GMLOS) (days):   Days to GMLOS:     OBJECTIVE:              Current admission status: Outpatient Surgery  Referral Reason: Other (Discharge planning)    Preferred Pharmacy:   [id number] - [place], [place] - [address]  [address] [address]  Phone: [phone] Fax: [phone]    Primary Care Provider: Srinivas Esquivel MD    Primary Insurance: MEDICARE  Secondary Insurance: BLUE CROSS    ASSESSMENT:  Active Health Care Proxies       [facility] -  Spouse   Primary Phone: [phone] (Mobile)                 Advance Directives  Does patient have a Health Care POA?: No  Was patient offered paperwork?: Yes  Does patient currently have a Health Care decision maker?: Yes, please see Health Care Proxy section  Does patient have Advance Directives?: No  Was patient offered paperwork?: Yes  Primary Contact: Jeny Benson - spouse         Readmission Root Cause  30 Day Readmission: No    Patient Information  Admitted from:: Home  Mental Status: Alert  During Assessment patient was accompanied by: Spouse  Assessment information provided by:: Patient, Spouse  Primary Caregiver: Self  Support Systems: Spouse/significant other  County of Residence: [place]  What city do you live in?: [place]  Home entry access options. Select all that apply.: Stairs  Number of steps to enter home.: 2  Do the steps have railings?: Yes  Type of Current Residence: 2 story home  Upon entering residence, is there a bedroom on the main floor (no further steps)?: No  A bedroom is located on the following floor levels of residence (select all that apply):: 2nd Floor  Upon entering residence, is there a bathroom on the main floor (no further steps)?: No  Indicate which floors of current residence have a bathroom (select all the apply):: 2nd Floor  Number of steps to 2nd floor from main floor: One Flight  Living Arrangements: Lives w/ Spouse/significant other  Is patient a ?: No    Activities of Daily Living Prior to Admission  Functional Status: Independent  Completes ADLs independently?: Yes  Ambulates independently?: Yes  Does patient use assisted devices?: No  Does patient currently own DME?: Yes  What DME does the patient currently own?: Walker  Does patient have a history of Outpatient Therapy (PT/OT)?: Yes  Does the patient have a history of Short-Term Rehab?: No  Does patient have a history of HHC?: No  Does patient currently have HHC?: No         Patient Information  Continued  Income Source: Pension/nursing home  Does patient have prescription coverage?: Yes  Does patient receive dialysis treatments?: No  Does patient have a history of substance abuse?: No  Does patient have a history of Mental Health Diagnosis?: No         Means of Transportation  Means of Transport to Appts:: Drives Self          DISCHARGE DETAILS:    Discharge planning discussed with:: patient, spouse  Freedom of Choice: Yes  Comments - Freedom of Choice: CM met with pt and spouse at bedside to discuss discharge planning. Pt hopes to return home and follow up with OP therapy at [facility] on 11/21 larry previously scheduled.  CM contacted family/caregiver?: Yes  Were Treatment Team discharge recommendations reviewed with patient/caregiver?: Yes  Did patient/caregiver verbalize understanding of patient care needs?: Yes  Were patient/caregiver advised of the risks associated with not following Treatment Team discharge recommendations?: Yes    Contacts  Patient Contacts: Jeny Benson - spouse  Relationship to Patient:: Family  Contact Method: In Person  Reason/Outcome: Discharge Planning    Requested Home Health Care         Is the patient interested in HHC at discharge?: No    DME Referral Provided  Referral made for DME?: No (Pt has all necessary DME)    Other Referral/Resources/Interventions Provided:  Interventions: Prescription Price Check  Referral Comments: [facility]10 cop[facility]         Treatment Team Recommendation: Home (with OP PT)  Discharge Destination Plan:: Home (with OP PT)  Transport at Discharge : Family

## 2024-11-19 NOTE — PHYSICAL THERAPY NOTE
PHYSICAL THERAPY TREATMENT NOTE  NAME:  Robert Benson  DATE: 11/19/24    Length Of Stay: 0  Performed at least 2 patient identifiers during session: Name and ID bracelet    TREATMENT:      11/19/24 0935   PT Last Visit   PT Visit Date 11/19/24   Note Type   Note Type Treatment   Pain Assessment   Pain Assessment Tool 0-10   Pain Score 8   Pain Location/Orientation Orientation: Left;Location: Knee   Pain Onset/Description Onset: Ongoing;Descriptor: Throbbing   Effect of Pain on Daily Activities limits mobility   Patient's Stated Pain Goal No pain   Hospital Pain Intervention(s) Medication (See MAR);Cold applied;Repositioned;Ambulation/increased activity;Emotional support;Rest;Elevated   Restrictions/Precautions   Weight Bearing Precautions Per Order Yes   LLE Weight Bearing Per Order FWB   Other Precautions Chair Alarm;Bed Alarm;Fall Risk;Pain;WBS;Multiple lines   General   Chart Reviewed Yes   Family/Caregiver Present No   Cognition   Overall Cognitive Status WFL   Arousal/Participation Responsive;Cooperative   Attention Within functional limits   Orientation Level Oriented X4   Memory Within functional limits   Following Commands Follows all commands and directions without difficulty   Comments pt agreeable to PT treatment   Bed Mobility   Supine to Sit 5  Supervision   Additional items Assist x 1;HOB elevated;Bedrails;Increased time required;Verbal cues   Additional Comments pt seated OOB in recliner post treatment session   Transfers   Sit to Stand 4  Minimal assistance   Additional items Assist x 1;Bedrails;Increased time required;Verbal cues   Stand to Sit 4  Minimal assistance   Additional items Assist x 1;Armrests;Increased time required;Verbal cues   Stand pivot   (CGA)   Additional items Assist x 1;Increased time required;Verbal cues   Additional Comments RW used; VC for safe hand placement and proper body mechanics -  educated patient on weight-bearing status during mobility   Ambulation/Elevation    Gait pattern Improper Weight shift;Wide IRVING;Decreased foot clearance;Decreased L stance;Foward flexed;Short stride;Step to;Excessively slow   Gait Assistance   (CGA)   Additional items Assist x 1;Verbal cues   Assistive Device Rolling walker   Distance 2 ft x1   Ambulation/Elevation Additional Comments cues for proper sequencing and RW management   Balance   Static Sitting Good   Dynamic Sitting Fair +   Static Standing Fair   Dynamic Standing Fair -   Ambulatory Fair -   Endurance Deficit   Endurance Deficit Yes   Endurance Deficit Description dizziness/lightheadedness reported after sitting out in recliner   Activity Tolerance   Activity Tolerance Patient limited by fatigue;Other (Comment)  (decreased BP, seated /69, post seated OOB in recliner 90/62, RN made aware)   Nurse Made Aware SUE Rehman made aware of outcomes and present at end of session   Exercises   Quad Sets Sitting;20 reps;AROM;Bilateral   Knee AROM Long Arc Quad Sitting;20 reps;AROM;Bilateral   Ankle Pumps Sitting;20 reps;AROM;Bilateral   Marching Sitting;20 reps;AROM;Bilateral   Assessment   Prognosis Good   Problem List Decreased strength;Decreased range of motion;Impaired balance;Decreased mobility;Pain;Orthopedic restrictions   Assessment Pt seen for PT treatment session this date with interventions consisting of gait training w/ emphasis on improving pt's ability to ambulate level surfaces x 2 ft x1 with CGA provided by therapist with RW, Therapeutic exercise consisting of: AROM 2 sets of 10 reps B LE in sitting position, and therapeutic activity consisting of training: bed mobility, supine<>sit transfers, sit<>stand transfers, and stand pivot transfers towards L direction. Pt agreeable to PT treatment session upon arrival, pt found supine in bed w/ HOB elevated, A&O x 4 and responsive. In comparison to previous session, pt with no improvements as evidenced by treatment limited by decreased BP . Post session: pt returned back to  recliner, chair alarm engaged, all needs in reach, and RN notified of session findings/recommendations. Continue to recommend Level III (Minimum Resource Intensity) at time of d/c in order to maximize pt's functional independence and safety w/ mobility. Pt continues to be functioning below baseline level. PT will continue to see pt during current hospitalization in order to address the deficits listed above and provide interventions consistent w/ POC in effort to achieve STGs.    Archana Hillman PTA   Barriers to Discharge Inaccessible home environment   Goals   Patient Goals to go home   LTG Expiration Date 11/28/24   Plan   Treatment/Interventions Functional transfer training;LE strengthening/ROM;Therapeutic exercise;Elevations;Endurance training;Bed mobility;Gait training;Patient/family training;Equipment eval/education   PT Frequency Twice a day   Discharge Recommendation   Rehab Resource Intensity Level, PT III (Minimum Resource Intensity)   Equipment Recommended Walker   Walker Package Recommended Wheeled walker   AM-PAC Basic Mobility Inpatient   Turning in Flat Bed Without Bedrails 3   Lying on Back to Sitting on Edge of Flat Bed Without Bedrails 3   Moving Bed to Chair 3   Standing Up From Chair Using Arms 3   Walk in Room 3   Climb 3-5 Stairs With Railing 2   Basic Mobility Inpatient Raw Score 17   Basic Mobility Standardized Score 39.67   [facility] Highest Level Of Mobility   -[facility] Goal 5: Stand one or more mins   -[facility] Achieved 4: Move to chair/commode   Education   Education Provided Mobility training;Assistive device   Patient Demonstrates verbal understanding;Reinforcement needed   End of Consult   Patient Position at End of Consult Bedside chair;Bed/Chair alarm activated;All needs within reach       The patient's AM-PAC Basic Mobility Inpatient Short Form Raw Score is 17. A Raw score of greater than 16 suggests the patient may benefit from discharge to home. Please also refer to the  recommendation of the Physical Therapist for safe discharge planning.      Archana Hillman, PTA,PTA

## 2024-11-19 NOTE — ASSESSMENT & PLAN NOTE
From: Naty Ross  To: Cassy Avalos MD  Sent: 8/31/2017 2:52 PM CDT  Subject: phone call    Received a voice mail to call regarding my test results. Unfortunately I do not have a private phone line at work that I can call from so thought I would message for the results.   Not in acute exacerbation  Respiratory protocol

## 2024-11-19 NOTE — PROGRESS NOTES
"Progress Note - Orthopedics   Name: Robert Benson 64 y.o. male I MRN: [id number]  Unit/Bed#: -01 I Date of Admission: 11/18/2024   Date of Service: 11/19/2024 I Hospital Day: 0     Assessment & Plan  Primary osteoarthritis of left knee    PONV (postoperative nausea and vomiting)    COPD (chronic obstructive pulmonary disease) (HCC)    CAD (coronary artery disease)    Sleep apnea    POD 1 s/p left total knee replacement   Leg drain removed  Dressing changed  Physical therapy and Occupational Therapy weightbearing as tolerated  DC planning  Out of bed  Fluid for hypotension according to protocol  Acute postop blood loss anemia continue to monitor  Orthopedics service will follow.    Subjective   64 y.o.male who was resting in the chair.  He became lightheaded and hypotensive this morning with ambulation.  Hypotension protocol was initiated.  He denies any fever or chills.    Objective :  Temp:  [96.9 °F (36.1 °C)-98.6 °F (37 °C)] 98.3 °F (36.8 °C)  HR:  [] 93  BP: ()/(47-83) 124/74  Resp:  [16-20] 16  SpO2:  [93 %-99 %] 96 %  O2 Device: None (Room air)  Nasal Cannula O2 Flow Rate (L/min):  [2 L/min] 2 L/min    Physical Exam  Left lower extremity is neurovascularly intact  Toes are pink and mobile  Compartments are soft  Incision is clean, dry and intact  Negative Homans  Brisk cap refill  Sensation intact      Lab Results: I have reviewed the following results:  Recent Labs     11/19/24  0455   WBC 10.40*   HGB 10.9*   HCT 33.5*      BUN 17   CREATININE 1.31*     Blood Culture:  No results found for: \"BLOODCX\"  Wound Culture: No results found for: \"WOUNDCULT\"  "

## 2024-11-20 ENCOUNTER — APPOINTMENT (OUTPATIENT)
Dept: PHYSICAL THERAPY | Facility: CLINIC | Age: 64
End: 2024-11-20
Payer: MEDICARE

## 2024-11-20 VITALS
BODY MASS INDEX: 34.13 KG/M2 | WEIGHT: 252 LBS | HEIGHT: 72 IN | HEART RATE: 109 BPM | SYSTOLIC BLOOD PRESSURE: 144 MMHG | OXYGEN SATURATION: 95 % | DIASTOLIC BLOOD PRESSURE: 75 MMHG | RESPIRATION RATE: 19 BRPM | TEMPERATURE: 98.7 F

## 2024-11-20 LAB
ANION GAP SERPL CALCULATED.3IONS-SCNC: 9 MMOL/L (ref 4–13)
BUN SERPL-MCNC: 17 MG/DL (ref 5–25)
CALCIUM SERPL-MCNC: 8.9 MG/DL (ref 8.4–10.2)
CHLORIDE SERPL-SCNC: 97 MMOL/L (ref 96–108)
CO2 SERPL-SCNC: 24 MMOL/L (ref 21–32)
CREAT SERPL-MCNC: 1.22 MG/DL (ref 0.6–1.3)
ERYTHROCYTE [DISTWIDTH] IN BLOOD BY AUTOMATED COUNT: 13.6 % (ref 11.6–15.1)
GFR SERPL CREATININE-BSD FRML MDRD: 61 ML/MIN/1.73SQ M
GLUCOSE SERPL-MCNC: 123 MG/DL (ref 65–140)
GLUCOSE SERPL-MCNC: 124 MG/DL (ref 65–140)
HCT VFR BLD AUTO: 32.3 % (ref 36.5–49.3)
HGB BLD-MCNC: 10.5 G/DL (ref 12–17)
MCH RBC QN AUTO: 27.6 PG (ref 26.8–34.3)
MCHC RBC AUTO-ENTMCNC: 32.5 G/DL (ref 31.4–37.4)
MCV RBC AUTO: 85 FL (ref 82–98)
OSMOLALITY UR/SERPL-RTO: 274 MMOL/KG (ref 282–298)
OSMOLALITY UR: 426 MMOL/KG (ref 250–900)
PLATELET # BLD AUTO: 219 THOUSANDS/UL (ref 149–390)
PMV BLD AUTO: 11 FL (ref 8.9–12.7)
POTASSIUM SERPL-SCNC: 3.5 MMOL/L (ref 3.5–5.3)
RBC # BLD AUTO: 3.8 MILLION/UL (ref 3.88–5.62)
SODIUM SERPL-SCNC: 130 MMOL/L (ref 135–147)
WBC # BLD AUTO: 8.74 THOUSAND/UL (ref 4.31–10.16)

## 2024-11-20 PROCEDURE — 80048 BASIC METABOLIC PNL TOTAL CA: CPT | Performed by: PHYSICIAN ASSISTANT

## 2024-11-20 PROCEDURE — 85027 COMPLETE CBC AUTOMATED: CPT | Performed by: PHYSICIAN ASSISTANT

## 2024-11-20 PROCEDURE — 82948 REAGENT STRIP/BLOOD GLUCOSE: CPT

## 2024-11-20 PROCEDURE — 97110 THERAPEUTIC EXERCISES: CPT

## 2024-11-20 PROCEDURE — 99024 POSTOP FOLLOW-UP VISIT: CPT | Performed by: ORTHOPAEDIC SURGERY

## 2024-11-20 PROCEDURE — NC001 PR NO CHARGE: Performed by: PHYSICIAN ASSISTANT

## 2024-11-20 PROCEDURE — 97116 GAIT TRAINING THERAPY: CPT

## 2024-11-20 RX ORDER — OXYCODONE HYDROCHLORIDE 5 MG/1
5 TABLET ORAL EVERY 4 HOURS PRN
Qty: 28 TABLET | Refills: 0 | Status: SHIPPED | OUTPATIENT
Start: 2024-11-20 | End: 2024-11-30

## 2024-11-20 RX ORDER — DOCUSATE SODIUM 100 MG/1
100 CAPSULE, LIQUID FILLED ORAL 2 TIMES DAILY
Qty: 60 CAPSULE | Refills: 0 | Status: SHIPPED | OUTPATIENT
Start: 2024-11-20

## 2024-11-20 RX ORDER — ACETAMINOPHEN 325 MG/1
650 TABLET ORAL EVERY 4 HOURS PRN
Start: 2024-11-20

## 2024-11-20 RX ADMIN — OXYCODONE HYDROCHLORIDE 10 MG: 10 TABLET ORAL at 05:02

## 2024-11-20 RX ADMIN — FERROUS SULFATE TAB 325 MG (65 MG ELEMENTAL FE) 325 MG: 325 (65 FE) TAB at 07:55

## 2024-11-20 RX ADMIN — ALUMINUM HYDROXIDE, MAGNESIUM HYDROXIDE, AND DIMETHICONE 30 ML: 200; 20; 200 SUSPENSION ORAL at 08:47

## 2024-11-20 RX ADMIN — Medication 1 TABLET: at 08:39

## 2024-11-20 RX ADMIN — METFORMIN HYDROCHLORIDE 500 MG: 500 TABLET ORAL at 07:55

## 2024-11-20 RX ADMIN — HYDROMORPHONE HYDROCHLORIDE 0.5 MG: 1 INJECTION, SOLUTION INTRAMUSCULAR; INTRAVENOUS; SUBCUTANEOUS at 08:48

## 2024-11-20 RX ADMIN — DOCUSATE SODIUM 100 MG: 100 CAPSULE, LIQUID FILLED ORAL at 08:39

## 2024-11-20 RX ADMIN — OXYCODONE HYDROCHLORIDE AND ACETAMINOPHEN 500 MG: 500 TABLET ORAL at 08:39

## 2024-11-20 RX ADMIN — SODIUM CHLORIDE 1 G: 1 TABLET ORAL at 07:55

## 2024-11-20 RX ADMIN — FENOFIBRATE 145 MG: 145 TABLET, FILM COATED ORAL at 08:39

## 2024-11-20 RX ADMIN — FOLIC ACID 1 MG: 1 TABLET ORAL at 08:39

## 2024-11-20 NOTE — PLAN OF CARE
Problem: PAIN - ADULT  Goal: Verbalizes/displays adequate comfort level or baseline comfort level  Description: Interventions:  - Encourage patient to monitor pain and request assistance  - Assess pain using appropriate pain scale  - Administer analgesics based on type and severity of pain and evaluate response  - Implement non-pharmacological measures as appropriate and evaluate response  - Consider cultural and social influences on pain and pain management  - Notify physician/advanced practitioner if interventions unsuccessful or patient reports new pain  Outcome: Progressing     Problem: INFECTION - ADULT  Goal: Absence or prevention of progression during hospitalization  Description: INTERVENTIONS:  - Assess and monitor for signs and symptoms of infection  - Monitor lab/diagnostic results  - Monitor all insertion sites, i.e. indwelling lines, tubes, and drains  - Monitor endotracheal if appropriate and nasal secretions for changes in amount and color  - Balsam appropriate cooling/warming therapies per order  - Administer medications as ordered  - Instruct and encourage patient and family to use good hand hygiene technique  - Identify and instruct in appropriate isolation precautions for identified infection/condition  Outcome: Progressing  Goal: Absence of fever/infection during neutropenic period  Description: INTERVENTIONS:  - Monitor WBC    Outcome: Progressing     Problem: SAFETY ADULT  Goal: Patient will remain free of falls  Description: INTERVENTIONS:  - Educate patient/family on patient safety including physical limitations  - Instruct patient to call for assistance with activity   - Consult OT/PT to assist with strengthening/mobility   - Keep Call bell within reach  - Keep bed low and locked with side rails adjusted as appropriate  - Keep care items and personal belongings within reach  - Initiate and maintain comfort rounds  - Make Fall Risk Sign visible to staff  - Offer Toileting every 2 Hours,  in advance of need  - Initiate/Maintain bed alarm  - Obtain necessary fall risk management equipment: yellow socks  - Apply yellow socks and bracelet for high fall risk patients  - Consider moving patient to room near nurses station  Outcome: Progressing  Goal: Maintain or return to baseline ADL function  Description: INTERVENTIONS:  -  Assess patient's ability to carry out ADLs; assess patient's baseline for ADL function and identify physical deficits which impact ability to perform ADLs (bathing, care of mouth/teeth, toileting, grooming, dressing, etc.)  - Assess/evaluate cause of self-care deficits   - Assess range of motion  - Assess patient's mobility; develop plan if impaired  - Assess patient's need for assistive devices and provide as appropriate  - Encourage maximum independence but intervene and supervise when necessary  - Involve family in performance of ADLs  - Assess for home care needs following discharge   - Consider OT consult to assist with ADL evaluation and planning for discharge  - Provide patient education as appropriate  Outcome: Progressing  Goal: Maintains/Returns to pre admission functional level  Description: INTERVENTIONS:  - Perform AM-PAC 6 Click Basic Mobility/ Daily Activity assessment daily.  - Set and communicate daily mobility goal to care team and patient/family/caregiver.   - Collaborate with rehabilitation services on mobility goals if consulted  - Perform Range of Motion 3 times a day.  - Reposition patient every 2 hours.  - Dangle patient 3 times a day  - Stand patient 3 times a day  - Ambulate patient 3 times a day  - Out of bed to chair 3 times a day   - Out of bed for meals 3 times a day  - Out of bed for toileting  - Record patient progress and toleration of activity level   Outcome: Progressing     Problem: DISCHARGE PLANNING  Goal: Discharge to home or other facility with appropriate resources  Description: INTERVENTIONS:  - Identify barriers to discharge w/patient and  caregiver  - Arrange for needed discharge resources and transportation as appropriate  - Identify discharge learning needs (meds, wound care, etc.)  - Arrange for interpretive services to assist at discharge as needed  - Refer to Case Management Department for coordinating discharge planning if the patient needs post-hospital services based on physician/advanced practitioner order or complex needs related to functional status, cognitive ability, or social support system  Outcome: Progressing     Problem: Knowledge Deficit  Goal: Patient/family/caregiver demonstrates understanding of disease process, treatment plan, medications, and discharge instructions  Description: Complete learning assessment and assess knowledge base.  Interventions:  - Provide teaching at level of understanding  - Provide teaching via preferred learning methods  Outcome: Progressing

## 2024-11-20 NOTE — ASSESSMENT & PLAN NOTE
Lab Results   Component Value Date    HGBA1C 6.8 (H) 10/17/2024       Recent Labs     11/19/24  1145 11/19/24  1626 11/19/24  2112 11/20/24  0712   POCGLU 133 161* 139 123       Blood Sugar Average: Last 72 hrs:  (P) 159.125

## 2024-11-20 NOTE — ASSESSMENT & PLAN NOTE
The patient is feeling much better.  Will discharge home today.  Bed  Weightbearing as tolerated left lower extremity  Physical/Occupational Therapy  Arixtra for DVT prophylaxis  Follow-up office 2 weeks after discharge

## 2024-11-20 NOTE — PROGRESS NOTES
Progress Note - Orthopedics   Name: Robert Benson 64 y.o. male I MRN: [id number]  Unit/Bed#: -01 I Date of Admission: 11/18/2024   Date of Service: 11/20/2024 I Hospital Day: 1     Assessment & Plan  Primary osteoarthritis of left knee    PONV (postoperative nausea and vomiting)    COPD (chronic obstructive pulmonary disease) (HCC)    CAD (coronary artery disease)    Sleep apnea    Diabetes mellitus, type II ([facility])  Lab Results   Component Value Date    HGBA1C 6.8 (H) 10/17/2024       Recent Labs     11/19/24  1145 11/19/24  1626 11/19/24  2112 11/20/24  0712   POCGLU 133 161* 139 123       Blood Sugar Average: Last 72 hrs:  (P) 159.125    Hyponatremia    ABLA (acute blood loss anemia)    The patient is feeling much better.  Will discharge home today.  Bed  Weightbearing as tolerated left lower extremity  Physical/Occupational Therapy  Arixtra for DVT prophylaxis  Follow-up office 2 weeks after discharge            Subjective   64 y.o.male postop day #2, status post left total knee replacement no acute events, no new complaints. Pain well controlled with analgesics. Denies fevers, chills, CP, SOB, N/V, numbness or tingling. Patient reports no issues with urination or bowel movements. Patient states is ready to go home today    Objective :  Temp:  [98.7 °F (37.1 °C)-99.8 °F (37.7 °C)] 98.7 °F (37.1 °C)  HR:  [] 109  BP: ()/(47-83) 144/75  Resp:  [18-19] 19  SpO2:  [93 %-97 %] 95 %  O2 Device: None (Room air)    Physical Exam  Musculoskeletal: leftlower  Skin intact. No erythema or ecchymosis.  Dressing incision is clean, dry, intact  TTP less tenderness over incisional site  Left lower extremity is neurovascularly intact.  Toes are pink and mobile.  Compartments are soft.  Incision is clean, dry, intact.  Negative Homans.  Good dorsiflexion of foot.  Improved quad tone      Lab Results: I have reviewed the following results:  Recent Labs     11/19/24  0455 11/19/24  1606 11/19/24 2202  "11/20/24  0500   WBC 10.40*  --   --  8.74   HGB 10.9*  --   --  10.5*   HCT 33.5*  --   --  32.3*     --   --  219   BUN 17 17 17 17   CREATININE 1.31* 1.36* 1.33* 1.22     Blood Culture:  No results found for: \"BLOODCX\"  Wound Culture: No results found for: \"WOUNDCULT\"  "

## 2024-11-20 NOTE — PLAN OF CARE
Problem: PHYSICAL THERAPY ADULT  Goal: Performs mobility at highest level of function for planned discharge setting.  See evaluation for individualized goals.  Description: Treatment/Interventions: Functional transfer training, Therapeutic exercise, Endurance training, Gait training, Bed mobility, Equipment eval/education, Elevations  Equipment Recommended: Walker       See flowsheet documentation for full assessment, interventions and recommendations.  Note: Prognosis: Good  Problem List: Decreased strength, Decreased range of motion, Decreased endurance, Impaired balance, Decreased mobility, Decreased safety awareness, Orthopedic restrictions, Pain  Assessment: Pt seen for PT treatment session this date with interventions consisting of gait training to normalize gait pattern to decrease fall risk and therapeutic exercise to improve strength to improve functional mobility. Pt agreeable to PT treatment session upon arrival, pt found supine in bed, in no apparent distress, A&O x 4, and responsive. Since previous session, pt has made good progress as evidenced by increased distance ambulated and decreased assistance required with mobility  Barriers during this session include pain.  Pt continues to be functioning below baseline level, and remains limited 2* factors listed above and including decreased strength, impaired activity tolerance, pain, decreased ROM, and decreased endurance.  Pt prognosis for achieving goals is good, pending pt progress with hospitalization/medical status improvements, and indicated by motivated to participate in therapy, ability to follow cues, and supportive family. PT will continue to see pt during current hospitalization in order to address the deficits listed above and provide interventions consistent w/ POC in effort to achieve goals. Current goals and POC remain appropriate, pt continues to have rehab potential  Upon conclusion pt supine in bed. The patient's AM-PAC Basic Mobility  Inpatient Short Form Raw Score is 19.  Based on patient presentations and impairments, pt would most appropriately benefit from Level 3 resource intensity upon discharge.  Please also refer to the recommendation of the Physical Therapist for safe discharge planning. RN verbalized pt appropriate for PT session.  Barriers to Discharge: Inaccessible home environment     Rehab Resource Intensity Level, PT: III (Minimum Resource Intensity)    See flowsheet documentation for full assessment.

## 2024-11-20 NOTE — DISCHARGE SUMMARY
Discharge Summary - Orthopedics   Name: Robert Benson 64 y.o. male I MRN: [id number]  Unit/Bed#: -01 I Date of Admission: 11/18/2024   Date of Service: 11/20/2024 I Hospital Day: 1    Admission Date: 11/18/2024 0615  Discharge Date: 11/20/24  Admitting Diagnosis: Primary osteoarthritis of left knee [M17.12]  Discharge Diagnosis: Primary osteoarthritis of left knee, status post elective left total knee replacement      Procedures Performed: Elective left total knee replacement    Summary of Hospital Course:     64-year-old male presents to the hospital for an elective left total knee replacement.  The patient tolerated the procedure quite well.  He was placed on Arixtra for DVT prophylaxis and began working with physical therapy and Occupational Therapy weightbearing as tolerated.  On postop day 1, his leg drain was removed and his dressing was changed.  His incision was clean, dry and intact.  On postop day 2, he continued to work with therapy and was then deemed suitable for discharge to home. The patient was discharged with pain medication, vitamins, DVT prophylaxis; and also an elevated commode and walker if necessary. The patient was instructed to paint incision with betadine two times per day.      Condition at Discharge: stable       Discharge instructions/Information to patient and family:   See After Visit Summary (AVS) for information provided to patient and family.      Provisions for Follow-Up Care:  See after visit summary for information related to follow-up care and any pertinent home health orders.      PCP: Srinivas Esquivel MD    Disposition: Home    Planned Readmission: No     Discharge Medications:  See after visit summary for reconciled discharge medications provided to patient and family.      Discharge Statement:  I have spent a total time of 30 minutes in caring for this patient on the day of the visit/encounter.

## 2024-11-20 NOTE — PLAN OF CARE
Problem: PAIN - ADULT  Goal: Verbalizes/displays adequate comfort level or baseline comfort level  Description: Interventions:  - Encourage patient to monitor pain and request assistance  - Assess pain using appropriate pain scale  - Administer analgesics based on type and severity of pain and evaluate response  - Implement non-pharmacological measures as appropriate and evaluate response  - Consider cultural and social influences on pain and pain management  - Notify physician/advanced practitioner if interventions unsuccessful or patient reports new pain  Outcome: Progressing     Problem: INFECTION - ADULT  Goal: Absence or prevention of progression during hospitalization  Description: INTERVENTIONS:  - Assess and monitor for signs and symptoms of infection  - Monitor lab/diagnostic results  - Monitor all insertion sites, i.e. indwelling lines, tubes, and drains  - Monitor endotracheal if appropriate and nasal secretions for changes in amount and color  - Boaz appropriate cooling/warming therapies per order  - Administer medications as ordered  - Instruct and encourage patient and family to use good hand hygiene technique  - Identify and instruct in appropriate isolation precautions for identified infection/condition  Outcome: Progressing  Goal: Absence of fever/infection during neutropenic period  Description: INTERVENTIONS:  - Monitor WBC    Outcome: Progressing     Problem: SAFETY ADULT  Goal: Patient will remain free of falls  Description: INTERVENTIONS:  - Educate patient/family on patient safety including physical limitations  - Instruct patient to call for assistance with activity   - Consult OT/PT to assist with strengthening/mobility   - Keep Call bell within reach  - Keep bed low and locked with side rails adjusted as appropriate  - Keep care items and personal belongings within reach  - Initiate and maintain comfort rounds  - Make Fall Risk Sign visible to staff  - Offer Toileting every 2 Hours,  in advance of need  - Initiate/Maintain bed alarm  - Obtain necessary fall risk management equipment: nonslip socks  - Apply yellow socks and bracelet for high fall risk patients  - Consider moving patient to room near nurses station  Outcome: Progressing  Goal: Maintain or return to baseline ADL function  Description: INTERVENTIONS:  -  Assess patient's ability to carry out ADLs; assess patient's baseline for ADL function and identify physical deficits which impact ability to perform ADLs (bathing, care of mouth/teeth, toileting, grooming, dressing, etc.)  - Assess/evaluate cause of self-care deficits   - Assess range of motion  - Assess patient's mobility; develop plan if impaired  - Assess patient's need for assistive devices and provide as appropriate  - Encourage maximum independence but intervene and supervise when necessary  - Involve family in performance of ADLs  - Assess for home care needs following discharge   - Consider OT consult to assist with ADL evaluation and planning for discharge  - Provide patient education as appropriate  Outcome: Progressing  Goal: Maintains/Returns to pre admission functional level  Description: INTERVENTIONS:  - Perform AM-PAC 6 Click Basic Mobility/ Daily Activity assessment daily.  - Set and communicate daily mobility goal to care team and patient/family/caregiver.   - Collaborate with rehabilitation services on mobility goals if consulted  - Perform Range of Motion 3 times a day.  - Reposition patient every 2 hours.  - Dangle patient 3 times a day  - Stand patient 3 times a day  - Ambulate patient 3 times a day  - Out of bed to chair 3 times a day   - Out of bed for meals 3 times a day  - Out of bed for toileting  - Record patient progress and toleration of activity level   Outcome: Progressing     Problem: DISCHARGE PLANNING  Goal: Discharge to home or other facility with appropriate resources  Description: INTERVENTIONS:  - Identify barriers to discharge w/patient and  caregiver  - Arrange for needed discharge resources and transportation as appropriate  - Identify discharge learning needs (meds, wound care, etc.)  - Arrange for interpretive services to assist at discharge as needed  - Refer to Case Management Department for coordinating discharge planning if the patient needs post-hospital services based on physician/advanced practitioner order or complex needs related to functional status, cognitive ability, or social support system  Outcome: Progressing     Problem: Knowledge Deficit  Goal: Patient/family/caregiver demonstrates understanding of disease process, treatment plan, medications, and discharge instructions  Description: Complete learning assessment and assess knowledge base.  Interventions:  - Provide teaching at level of understanding  - Provide teaching via preferred learning methods  Outcome: Progressing

## 2024-11-20 NOTE — PHYSICAL THERAPY NOTE
PT Treatment Note    NAME:  Robert Benson  DATE: 11/20/24    AGE:   64 y.o.  Mrn:   [id number]  ADMIT DX:  Primary osteoarthritis of left knee [M17.12]  Performed at least 2 patient identifiers during session: Name and Epic photo       11/20/24 0837   PT Last Visit   PT Visit Date 11/20/24   Note Type   Note Type BID visit/treatment   Pain Assessment   Pain Assessment Tool 0-10   Pain Score 8   Pain Location/Orientation Orientation: Left;Location: Leg;Location: Knee   Pain Onset/Description Onset: Ongoing   Patient's Stated Pain Goal No pain   Hospital Pain Intervention(s) Medication (See MAR);Ambulation/increased activity;Repositioned   Restrictions/Precautions   Weight Bearing Precautions Per Order Yes   LLE Weight Bearing Per Order FWB   Other Precautions Chair Alarm;Bed Alarm;Fall Risk;Pain   General   Chart Reviewed Yes   Family/Caregiver Present No   Cognition   Overall Cognitive Status WFL   Arousal/Participation Responsive;Cooperative   Attention Within functional limits   Orientation Level Oriented X4   Memory Within functional limits   Following Commands Follows all commands and directions without difficulty   Subjective   Subjective I am ready to go home   Bed Mobility   Supine to Sit 4  Minimal assistance   Additional items Assist x 1;LE management;Increased time required   Sit to Supine 4  Minimal assistance   Additional items Assist x 1;LE management   Transfers   Sit to Stand 5  Supervision   Additional items Assist x 1;Increased time required;Armrests   Stand to Sit 5  Supervision   Additional items Assist x 1;Increased time required;Armrests   Ambulation/Elevation   Gait pattern Improper Weight shift;Wide IRVING;Decreased foot clearance;Decreased L stance;Short stride;Step to;Excessively slow   Gait Assistance   (cga)   Additional items Assist x 1;Verbal cues   Assistive Device Rolling walker   Distance 20 ft x 2   Stair Management Assistance   (cga)   Additional items Assist x 1   Stair Management  Technique Two rails;Step to pattern   Number of Stairs 5   Ambulation/Elevation Additional Comments Pt reprots feeling slightly lightheaded, but believes that it is from pain medication   Balance   Static Sitting Good   Dynamic Sitting Fair +   Static Standing Fair +   Dynamic Standing Fair   Ambulatory Fair   Endurance Deficit   Endurance Deficit Yes   Activity Tolerance   Activity Tolerance Patient limited by pain;Patient limited by fatigue   Exercises   Quad Sets Sitting;20 reps;Left   Hip Abduction Sitting;15 reps;Bilateral   Hip Adduction Sitting;15 reps;Bilateral   Knee AROM Short Arc Quad Sitting;10 reps;Left   Ankle Pumps Sitting;20 reps;Bilateral   Assessment   Prognosis Good   Problem List Decreased strength;Decreased range of motion;Decreased endurance;Impaired balance;Decreased mobility;Decreased safety awareness;Orthopedic restrictions;Pain   Assessment Pt seen for PT treatment session this date with interventions consisting of gait training to normalize gait pattern to decrease fall risk and therapeutic exercise to improve strength to improve functional mobility. Pt agreeable to PT treatment session upon arrival, pt found supine in bed, in no apparent distress, A&O x 4, and responsive. Since previous session, pt has made good progress as evidenced by increased distance ambulated and decreased assistance required with mobility  Barriers during this session include pain.  Pt continues to be functioning below baseline level, and remains limited 2* factors listed above and including decreased strength, impaired activity tolerance, pain, decreased ROM, and decreased endurance.  Pt prognosis for achieving goals is good, pending pt progress with hospitalization/medical status improvements, and indicated by motivated to participate in therapy, ability to follow cues, and supportive family. PT will continue to see pt during current hospitalization in order to address the deficits listed above and provide  interventions consistent w/ POC in effort to achieve goals. Current goals and POC remain appropriate, pt continues to have rehab potential  Upon conclusion pt supine in bed. The patient's AM-PAC Basic Mobility Inpatient Short Form Raw Score is 19.  Based on patient presentations and impairments, pt would most appropriately benefit from Level 3 resource intensity upon discharge.  Please also refer to the recommendation of the Physical Therapist for safe discharge planning. RN verbalized pt appropriate for PT session.   Goals   Patient Goals To get home   LTG Expiration Date 11/28/24   PT Treatment Day 2   Plan   Treatment/Interventions Functional transfer training;LE strengthening/ROM;Elevations;Therapeutic exercise;Endurance training;Gait training   Progress Progressing toward goals   PT Frequency Twice a day   Discharge Recommendation   Rehab Resource Intensity Level, PT III (Minimum Resource Intensity)   Equipment Recommended Walker   AM-PAC Basic Mobility Inpatient   Turning in Flat Bed Without Bedrails 4   Lying on Back to Sitting on Edge of Flat Bed Without Bedrails 3   Moving Bed to Chair 3   Standing Up From Chair Using Arms 3   Walk in Room 3   Climb 3-5 Stairs With Railing 3   Basic Mobility Inpatient Raw Score 19   Basic Mobility Standardized Score 42.48   [facility] Highest Level Of Mobility   JH-HLM Goal 6: Walk 10 steps or more   JH-HLM Achieved 7: Walk 25 feet or more       Time In: 0837  Time Out: 0925  Total Treatment Minutes: 48    Sandip Reed, PT

## 2024-11-21 ENCOUNTER — TELEPHONE (OUTPATIENT)
Age: 64
End: 2024-11-21

## 2024-11-21 ENCOUNTER — TRANSITIONAL CARE MANAGEMENT (OUTPATIENT)
Dept: FAMILY MEDICINE CLINIC | Facility: CLINIC | Age: 64
End: 2024-11-21

## 2024-11-21 ENCOUNTER — OFFICE VISIT (OUTPATIENT)
Dept: PHYSICAL THERAPY | Facility: CLINIC | Age: 64
End: 2024-11-21
Payer: MEDICARE

## 2024-11-21 ENCOUNTER — TELEPHONE (OUTPATIENT)
Dept: OBGYN CLINIC | Facility: HOSPITAL | Age: 64
End: 2024-11-21

## 2024-11-21 VITALS — TEMPERATURE: 97.2 F | SYSTOLIC BLOOD PRESSURE: 113 MMHG | DIASTOLIC BLOOD PRESSURE: 65 MMHG | HEART RATE: 128 BPM

## 2024-11-21 DIAGNOSIS — M17.12 PRIMARY OSTEOARTHRITIS OF LEFT KNEE: Primary | ICD-10-CM

## 2024-11-21 DIAGNOSIS — Z96.652 TOTAL KNEE REPLACEMENT STATUS, LEFT: ICD-10-CM

## 2024-11-21 PROCEDURE — 97140 MANUAL THERAPY 1/> REGIONS: CPT | Performed by: PHYSICAL THERAPIST

## 2024-11-21 PROCEDURE — 97164 PT RE-EVAL EST PLAN CARE: CPT | Performed by: PHYSICAL THERAPIST

## 2024-11-21 PROCEDURE — 97116 GAIT TRAINING THERAPY: CPT | Performed by: PHYSICAL THERAPIST

## 2024-11-21 NOTE — TELEPHONE ENCOUNTER
Caller: Rinku    Reason for call: Returning Nurse Jenae's Call. Please call back at your earliest convince.    Warm Transferred to nurse

## 2024-11-21 NOTE — PROGRESS NOTES
Pt wife called and stated they are refusing the TCM due to pt mobility and they live an hour away.

## 2024-11-21 NOTE — TELEPHONE ENCOUNTER
"Patient called back in, full Postoperative Follow up call assessment.      Patient  states \"doing okay.\"  Patient states current pain level of a  7/8 out of 10, and recently took medication. He is ambulating with the RW.  Patient reports \"still pretty swollen\" and dressing is clean, dry and intact. Patient is icing the site we discussed postoperative swelling, continuing to ICE areas of pain/swelling, especially after increased activity over the next few weeks.     He had OP PT today.     We reviewed patients AVS medication list. Patient is taking Tylenol 1000mg every 8 hours, Oxycodone 5mg PRN, Arixtra daily, and and Colace 100mg BID. Patient has not yet had a BM, and we discussed continuing until going regularly.     Patient denies nausea, vomiting, abdominal pain, chest pain, shortness of breath, fever, dizziness and calf pain. Patient does not have any other questions or concerns at this time. Pt was encouraged to call with any questions, concerns or issues.    "

## 2024-11-21 NOTE — PROGRESS NOTES
PT Re-Evaluation     Today's date: 2024  Patient name: Robert Benson  : 1960  MRN: [id number]  Referring provider: Drake Vasquez,*  Dx:   Encounter Diagnosis     ICD-10-CM    1. Primary osteoarthritis of left knee  M17.12       2. Total knee replacement status, left  Z96.652                      Assessment  Impairments: abnormal or restricted ROM, abnormal movement, activity intolerance, impaired balance, impaired physical strength, lacks appropriate home exercise program, pain with function, weight-bearing intolerance, participation limitations and activity limitations  Functional limitations: walking limited to household distances with RW,  limited tolerance to prolonged sitting/driving,much difficulty with stair navigation, requires assistance with ADLs/IADLs dressing and bathing    Assessment details: Robert Benson is a 64 y.o. male who presents to PT 3 days post-op L TKA. Patient had some complications post-operatively regarding blood sugar levels and BP, which led to a longer hospital stay than expected. Patient presents with pain, limited L knee ROM and poor quad activation as expected post-operatively. He is ambulating with a RW and is having much difficulty navigating stairs at home. Worked on stair training using single crutch and railing today. Patient demonstrated ability to perform reciprocal stair climb, however he fatigues easily and is feeling lightheaded since surgery. Recommended patient continue to use his method of sitting to scoot up stairs to avoid fall down steps. Patient agreeable. Patient will benefit from skilled PT services to restore ROM and strength and to recover full function of LE. Patient's prognosis is good. Recommending patient attend PT 2x/week. Please contact me if you have any questions or recommendations.  Thank you for the opportunity to share in  [place] care.       Understanding of Dx/Px/POC: good     Prognosis: good    Goals  STGs  1) In 1 visit  patient will be independent with HEP    Updated goals 11/21/24:  STGs  1) In 4 weeks patient will demonstrate knee ROM 0-120 deg  2) In 4 weeks patient will demonstrate good quad actviation, able to complete supine SLR flexion without quad lag  3) In 4 weeks patient will demonstrate improved safety with gait, able to navigate household distances with SPC    LTGs  1) In 6-12 weeks patient will have no difficulty with stair navigation  2) In 6-12 weeks patient will have no difficulty with ambulation on all surfaces  3) In 6-12 weeks patient will report little to no difficulty with all ADLs and IADLs  4) IN 12+ weeks patient will report little to no difficulty with all recreational/leisure activities    Plan  Patient would benefit from: skilled physical therapy  Referral necessary: No  Planned modality interventions: cryotherapy, TENS and thermotherapy: hydrocollator packs    Planned therapy interventions: IASTM, joint mobilization, kinesiology taping, manual therapy, massage, [place] taping, abdominal trunk stabilization, balance, balance/weight bearing training, neuromuscular re-education, postural training, self care, strengthening, stretching, therapeutic activities, gait training, functional ROM exercises, flexibility and home exercise program    Frequency: 2x week  Duration in weeks: 12  Plan of Care beginning date: 11/21/2024  Plan of Care expiration date: 2/6/2025  Treatment plan discussed with: patient        Subjective Evaluation    History of Present Illness  Mechanism of injury: -patient is scheduled for L TKA on 11/18 with Dr. Rodriguez  -current LOF: hurts to walk, walking limited to about 1/4 mile, limited tolerance to prolonged sitting/driving, no limitation on stairs, limited and painful crouching/stooping  -lives at home with his spouse  -lives in 2 story home, FF of stairs with full bath upstairs/half bath downstairs  -walk in shower  -owns a walker  -patient's goal is to return directly home from the  hospital after surgery    Post-op evaluation 24:  -underwent surgery L TKA as planned on   -did not DC from hospital until  due to variable blood sugar levels and varying BP  -patient reports taking Oxy this a.m. before his PT  -reports his L hip and buttock have been bothersome and painful since the surgery, has had sciatic pain in the past  -patient is ambulating with a walker, but reports he was having the most problem with getting up his steps last night  -he was able to get up steps by sitting and scooting, able to come down steps with a single crutch and railing  -using a chair currently to shower  -slept in bed well last night, slept well  -can stand long enough to brush teeth and use toilet, but has not been standing/walking for more than just short household distances  -requires assistance with getting dressed  -requires assistance with getting into the car, was able to get out of car independently (grabbed pant leg to lift the leg)  -uses leg  at home to get in/out of bed or on/off couch  Patient Goals  Patient goals for therapy: return to sport/leisure activities, increased strength, decreased pain, increased motion, improved balance, independence with ADLs/IADLs and decreased edema  Patient goal: walk for exercise (3 miles on level track) without limitation, work on cars without limitatoin  Pain  Current pain ratin  At best pain ratin  At worst pain ratin    Social Support  Steps to enter house: yes  2  Stairs in house: yes   Lives in: multiple-level home  Lives with: spouse    Employment status: not working  Treatments  No previous or current treatments      Objective     Observations   Left Knee   Positive for incision.     Additional Observation Details  Incision closed with staples; bandaging removed and replaced, covered with ACE bandages; incision C/D/I no s/s of infection    Active Range of Motion   Left Knee   Flexion: 85 degrees   Extension: 22 degrees      Passive Range of Motion   Left Knee   Flexion: 94 degrees   Extension: 22 degrees     Strength/Myotome Testing     Left Hip   Planes of Motion   Abduction: 3+    Left Knee   Flexion: 3+  Extension: 2+  Quadriceps contraction: poor    Additional Strength Details  24: Poor quad activation, unable to independently lift from supine    Tests   Left Ankle/Foot   Negative for Homans' sign.     Ambulation     Ambulation: Level Surfaces   Ambulation with assistive device: independent  Ambulation without assistive device: unable    Additional Level Surfaces Ambulation Details  24: Using RW, ambulates on flexed knee, poor heel strike, unable to achieve TKE on L    Ambulation: Stairs   Ascend stairs: contact guard assist  Pattern: non-reciprocal  Railings: one rail  Descend stairs: contact guard assist  Pattern: non-reciprocal  Railings: one rail    Additional Stairs Ambulation Details  24: able to navigate steps non-reciprocally with crutch in one UE, railing on other side    Functional Assessment        Comments  TU seconds, no AD  Re-evaluation 24: 45 seconds with RW    JR. LEXIE Score: 8 / 28, Interval Score: 65.994 / 100  RAPT score 12: plan to DC home from hospital              Precautions: none  CO-MORBIDITIES: HTN, h/o R TKA, diabetes, COPD, heart murmur  HEP ACCESS CODE: n/a (pre-op handout)  FOTO Completed On: 24    POC Expires Reeval for Medicare to be completed Unit LImit Auth Expiration Date PT/OT/STVisit Limit   25 By visit  11 N/a N/a N/a    Completed on                .qt  TREATMENT DIARY  Auth Status DATE        NA Visit # 1 2        Remaining N/a        MANUAL THERAPY         L knee stretching   KG                                                    THERAPEUTIC EXERCISE HEP         Nustep for ROM and endurance          Ankle pumps x         Glute sets x         Quad sets x         Heel slides x         Supine hip abduction x         LAQ x         HR/TR                                                                                           NEUROMUSCULAR REEDUCATION                                                                                                                                                       THERAPEUTIC ACTIVITY          Stair training  4 steps up/down with railing  X5 mins 4 steps up/down with railing and single crutch    8 mins                                     GAIT TRAINING          Level surface                                        MODALITIES         CP post tx prn

## 2024-11-23 DIAGNOSIS — M17.12 PRIMARY OSTEOARTHRITIS OF LEFT KNEE: ICD-10-CM

## 2024-11-25 ENCOUNTER — TELEMEDICINE (OUTPATIENT)
Dept: FAMILY MEDICINE CLINIC | Facility: CLINIC | Age: 64
End: 2024-11-25
Payer: MEDICARE

## 2024-11-25 VITALS
DIASTOLIC BLOOD PRESSURE: 74 MMHG | SYSTOLIC BLOOD PRESSURE: 132 MMHG | HEART RATE: 100 BPM | WEIGHT: 252 LBS | HEIGHT: 72 IN | BODY MASS INDEX: 34.13 KG/M2

## 2024-11-25 DIAGNOSIS — E08.40 DIABETES MELLITUS DUE TO UNDERLYING CONDITION WITH DIABETIC NEUROPATHY, WITHOUT LONG-TERM CURRENT USE OF INSULIN (HCC): Primary | ICD-10-CM

## 2024-11-25 DIAGNOSIS — E87.1 HYPONATREMIA: ICD-10-CM

## 2024-11-25 DIAGNOSIS — K59.00 CONSTIPATION, UNSPECIFIED CONSTIPATION TYPE: ICD-10-CM

## 2024-11-25 PROBLEM — Z01.810 PRE-OPERATIVE CARDIOVASCULAR EXAMINATION: Status: RESOLVED | Noted: 2024-10-15 | Resolved: 2024-11-25

## 2024-11-25 PROCEDURE — 99495 TRANSJ CARE MGMT MOD F2F 14D: CPT | Performed by: INTERNAL MEDICINE

## 2024-11-25 PROCEDURE — 88311 DECALCIFY TISSUE: CPT | Performed by: PATHOLOGY

## 2024-11-25 PROCEDURE — 88305 TISSUE EXAM BY PATHOLOGIST: CPT | Performed by: PATHOLOGY

## 2024-11-25 RX ORDER — FERROUS SULFATE 324(65)MG
TABLET, DELAYED RELEASE (ENTERIC COATED) ORAL
Qty: 60 TABLET | Refills: 5 | Status: SHIPPED | OUTPATIENT
Start: 2024-11-25

## 2024-11-25 NOTE — TELEPHONE ENCOUNTER
"Spoke to patient, reviewed AVS which states \"You may shower after 5 days, but remember to keep the dressings dry. \" Discussed keeping incision clean, dry and not getting wet until see in the office.     pt encouraged to call me with questions, concerns or issues.      "

## 2024-11-25 NOTE — TELEPHONE ENCOUNTER
Caller: Patients wife Jeny    Doctor: Michael    Reason for call: Asking if his incision can get wet.     Call back#: [phone]

## 2024-11-25 NOTE — PROGRESS NOTES
Virtual TCM Visit:    Verification of patient location:    Patient is located at Home in the following state in which I hold an active license [place]      Assessment & Plan  1. Postoperative care.  He underwent left knee replacement surgery 1 week ago at the [facility]. He experienced lightheadedness and difficulty controlling blood sugar and blood pressure postoperatively, which delayed his discharge by an extra day. Since returning home, he has been progressively improving and has already attended one physical therapy session, with another scheduled for tomorrow. He is advised to continue physical therapy to help reduce joint pain and swelling over the coming weeks.    2. Diabetes Mellitus Type 2.  His elevated blood sugar levels are likely a response to the stress of surgery. He does not regularly monitor his blood sugar at home, but he was advised to watch for symptoms of hyperglycemia, such as excessive urination or extreme thirst, and to report these if they occur. His blood sugar levels will be checked in the lab tomorrow as part of a basic metabolic panel ordered by his nephrologist.    3. Hyponatremia.  Due to his history of hyponatremia, he will continue taking sodium chloride tablets 1 g every other day. This regimen will be reassessed based on future lab results.    4. Constipation.  He is experiencing constipation, likely exacerbated by oxycodone use. He is advised to minimize oxycodone use and rely more on Tylenol for pain management. He is also taking Colace 100 mg twice a day to help with bowel movements.              Encounter provider Srinivas Esquivel MD     Provider located at [facility]  [address] [place] [id number]    After connecting through [employer], the patient was identified by name and date of birth. Robert Benson was informed that this is a telemedicine visit and that the visit is being conducted through the Epic  Embedded platform. He agrees to proceed..  My office door was closed. No one else was in the room.  He acknowledged consent and understanding of privacy and security of the video platform. The patient has agreed to participate and understands they can discontinue the visit at any time.    Patient is aware this is a billable service.    History of Present Illness     Transitional Care Management Review:   Robert Benson is a 64 y.o. male here for TCM follow up.    During the TCM phone call patient stated:  TCM Call       Date and time call was made  11/21/2024  9:26 AM    Patient was hospitialized at  [facility]    Date of Admission  11/18/24    Date of discharge  11/20/24    Diagnosis  Primary osteoarthritis of left knee    Disposition  Home          TCM Call       Scheduled for follow up?  Patient refused    I have advised the patient to call PCP with any new or worsening symptoms  [facility]    Living Arrangements  Family members    Support System  Family          History of Present Illness  The patient is a 64-year-old male who presents via virtual visit for evaluation of multiple medical concerns.    He underwent a left knee replacement surgery at [facility] a week ago, which required a two-day hospital stay. Post-surgery, he experienced lightheadedness, high blood sugar, and low blood pressure, which delayed his discharge. Since returning home on 11/20/2024, he has been recovering well and has attended one physical therapy session, with another scheduled for tomorrow.    His appetite has decreased, and he is experiencing difficulty with bowel movements, which he believes may be related to not eating much. He is consuming small amounts of solid food and supplementing his diet with protein shakes.    He does not monitor his blood sugar at home, but it has been consistently controlled during medical check-ups. He does not own a glucose monitor. He is scheduled for blood  work for nephrology tomorrow.    His current medications include Tylenol as needed, amoxicillin for dental work, vitamin C5 100 mg twice daily, Lipitor 40 mg every evening, Colace 100 mg twice daily, Nexium 40 mg daily, fenofibrate 145 mg daily, iron twice daily, folate 1 mg daily, Arixtra injection once daily for two weeks, Jardiance 10 mg daily, lisinopril 5 mg daily, metformin 500 mg with breakfast, multivitamin, nicotine lozenges, oxycodone as needed, and sodium chloride tablets 1 g every other day. He is attempting to reduce his use of oxycodone and Tylenol.       Objective   /74 (BP Location: Right arm, Patient Position: Sitting, Cuff Size: Large)   Pulse 100   Ht 6' (1.829 m)   Wt 114 kg (252 lb)   BMI 34.18 kg/m²     Physical Exam  Constitutional:       General: He is not in acute distress.     Appearance: Normal appearance. He is not ill-appearing.   Neurological:      Mental Status: He is alert.       Medications have been reviewed by provider in current encounter      Srinivas Esquivel MD      VIRTUAL VISIT DISCLAIMER    Robert Benson verbally agrees to participate in Virtual Care Services. Pt is aware that Virtual Care Services could be limited without vital signs or the ability to perform a full hands-on physical exam. Robert Benson understands he or the provider may request at any time to terminate the video visit and request the patient to seek care or treatment in person.

## 2024-11-26 ENCOUNTER — OFFICE VISIT (OUTPATIENT)
Dept: PHYSICAL THERAPY | Facility: CLINIC | Age: 64
End: 2024-11-26
Payer: MEDICARE

## 2024-11-26 ENCOUNTER — APPOINTMENT (OUTPATIENT)
Age: 64
End: 2024-11-26
Payer: MEDICARE

## 2024-11-26 DIAGNOSIS — M17.12 PRIMARY OSTEOARTHRITIS OF LEFT KNEE: Primary | ICD-10-CM

## 2024-11-26 DIAGNOSIS — Z96.652 TOTAL KNEE REPLACEMENT STATUS, LEFT: ICD-10-CM

## 2024-11-26 DIAGNOSIS — D47.2 MGUS (MONOCLONAL GAMMOPATHY OF UNKNOWN SIGNIFICANCE): ICD-10-CM

## 2024-11-26 DIAGNOSIS — N18.31 STAGE 3A CHRONIC KIDNEY DISEASE (HCC): ICD-10-CM

## 2024-11-26 DIAGNOSIS — E87.1 HYPONATREMIA: ICD-10-CM

## 2024-11-26 LAB
ALBUMIN SERPL BCG-MCNC: 4.3 G/DL (ref 3.5–5)
ALP SERPL-CCNC: 50 U/L (ref 34–104)
ALT SERPL W P-5'-P-CCNC: 24 U/L (ref 7–52)
ANION GAP SERPL CALCULATED.3IONS-SCNC: 11 MMOL/L (ref 4–13)
AST SERPL W P-5'-P-CCNC: 26 U/L (ref 13–39)
BASOPHILS # BLD AUTO: 0.06 THOUSANDS/ΜL (ref 0–0.1)
BASOPHILS NFR BLD AUTO: 1 % (ref 0–1)
BILIRUB SERPL-MCNC: 0.75 MG/DL (ref 0.2–1)
BUN SERPL-MCNC: 21 MG/DL (ref 5–25)
CALCIUM SERPL-MCNC: 9.5 MG/DL (ref 8.4–10.2)
CHLORIDE SERPL-SCNC: 94 MMOL/L (ref 96–108)
CO2 SERPL-SCNC: 26 MMOL/L (ref 21–32)
CREAT SERPL-MCNC: 1.21 MG/DL (ref 0.6–1.3)
EOSINOPHIL # BLD AUTO: 0.32 THOUSAND/ΜL (ref 0–0.61)
EOSINOPHIL NFR BLD AUTO: 3 % (ref 0–6)
ERYTHROCYTE [DISTWIDTH] IN BLOOD BY AUTOMATED COUNT: 13.6 % (ref 11.6–15.1)
GFR SERPL CREATININE-BSD FRML MDRD: 62 ML/MIN/1.73SQ M
GLUCOSE P FAST SERPL-MCNC: 117 MG/DL (ref 65–99)
HCT VFR BLD AUTO: 34.5 % (ref 36.5–49.3)
HGB BLD-MCNC: 11.5 G/DL (ref 12–17)
IGA SERPL-MCNC: 172 MG/DL (ref 66–433)
IGG SERPL-MCNC: 729 MG/DL (ref 635–1741)
IGM SERPL-MCNC: 804 MG/DL (ref 45–281)
IMM GRANULOCYTES # BLD AUTO: 0.02 THOUSAND/UL (ref 0–0.2)
IMM GRANULOCYTES NFR BLD AUTO: 0 % (ref 0–2)
LYMPHOCYTES # BLD AUTO: 1.82 THOUSANDS/ΜL (ref 0.6–4.47)
LYMPHOCYTES NFR BLD AUTO: 19 % (ref 14–44)
MAGNESIUM SERPL-MCNC: 2.1 MG/DL (ref 1.9–2.7)
MCH RBC QN AUTO: 28.2 PG (ref 26.8–34.3)
MCHC RBC AUTO-ENTMCNC: 33.3 G/DL (ref 31.4–37.4)
MCV RBC AUTO: 85 FL (ref 82–98)
MONOCYTES # BLD AUTO: 0.72 THOUSAND/ΜL (ref 0.17–1.22)
MONOCYTES NFR BLD AUTO: 7 % (ref 4–12)
NEUTROPHILS # BLD AUTO: 6.78 THOUSANDS/ΜL (ref 1.85–7.62)
NEUTS SEG NFR BLD AUTO: 70 % (ref 43–75)
NRBC BLD AUTO-RTO: 0 /100 WBCS
PLATELET # BLD AUTO: 412 THOUSANDS/UL (ref 149–390)
PMV BLD AUTO: 11.5 FL (ref 8.9–12.7)
POTASSIUM SERPL-SCNC: 3.9 MMOL/L (ref 3.5–5.3)
PROT SERPL-MCNC: 7.5 G/DL (ref 6.4–8.4)
RBC # BLD AUTO: 4.08 MILLION/UL (ref 3.88–5.62)
SODIUM SERPL-SCNC: 131 MMOL/L (ref 135–147)
WBC # BLD AUTO: 9.72 THOUSAND/UL (ref 4.31–10.16)

## 2024-11-26 PROCEDURE — 83735 ASSAY OF MAGNESIUM: CPT

## 2024-11-26 PROCEDURE — 36415 COLL VENOUS BLD VENIPUNCTURE: CPT

## 2024-11-26 PROCEDURE — 85025 COMPLETE CBC W/AUTO DIFF WBC: CPT

## 2024-11-26 PROCEDURE — 83521 IG LIGHT CHAINS FREE EACH: CPT

## 2024-11-26 PROCEDURE — 97140 MANUAL THERAPY 1/> REGIONS: CPT

## 2024-11-26 PROCEDURE — 82784 ASSAY IGA/IGD/IGG/IGM EACH: CPT

## 2024-11-26 PROCEDURE — 80053 COMPREHEN METABOLIC PANEL: CPT

## 2024-11-26 PROCEDURE — 82232 ASSAY OF BETA-2 PROTEIN: CPT

## 2024-11-26 PROCEDURE — 97110 THERAPEUTIC EXERCISES: CPT

## 2024-11-26 NOTE — PROGRESS NOTES
"Daily Note     Today's date: 2024  Patient name: Robert Benson  : 1960  MRN: [id number]  Referring provider: Drake Vasquez,*  Dx:   Encounter Diagnosis     ICD-10-CM    1. Primary osteoarthritis of left knee  M17.12       2. Total knee replacement status, left  Z96.652                      Subjective: Pt has been compliant with his HEP. He has been weaning off his prescribed pain meds, now using OTC anti-inflammatories.       Objective: See treatment diary below      Assessment: Multiple exercises and self stretches initiated as per pt's POC. Overall, tolerated treatment well. He did lack TKE during manual therapy. CP applied to the L knee during a LLLD stretch to decrease edema and increase TKE. Patient demonstrated fatigue post treatment, exhibited good technique with therapeutic exercises, and would benefit from continued PT      Plan: Continue per plan of care.  Progress treatment as tolerated.       Precautions: none  CO-MORBIDITIES: HTN, h/o R TKA, diabetes, COPD, heart murmur  HEP ACCESS CODE: n/a (pre-op handout)  FOTO Completed On: 24    POC Expires Reeval for Medicare to be completed Unit LImit Auth Expiration Date PT/OT/STVisit Limit   25 By visit  11 N/a N/a N/a    Completed on                .qt  TREATMENT DIARY  Auth Status DATE       NA Visit # 1 2 3       Remaining N/a        MANUAL THERAPY         L knee stretching   KG TM                                                   THERAPEUTIC EXERCISE HEP         Nustep for ROM and endurance    L2 x10 min      Ankle pumps x         Glute sets x         Quad sets x   5\" x20      Heel slides x   Supine w/ strap 5\" x20      Supine hip abduction x   W/ slide board x20      LAQ x   NV      HR/TR          SAQ    5\" x10                                                                            NEUROMUSCULAR REEDUCATION                                                                                               "                                                         THERAPEUTIC ACTIVITY          Stair training  4 steps up/down with railing  X5 mins 4 steps up/down with railing and single crutch    8 mins                                     GAIT TRAINING          Level surface                                        MODALITIES         CP post tx prn   Seated with heel prop X8 min

## 2024-11-27 LAB
KAPPA LC FREE SER-MCNC: 47.2 MG/L (ref 3.3–19.4)
KAPPA LC FREE/LAMBDA FREE SER: 1.75 {RATIO} (ref 0.26–1.65)
LAMBDA LC FREE SERPL-MCNC: 27 MG/L (ref 5.7–26.3)

## 2024-11-29 ENCOUNTER — OFFICE VISIT (OUTPATIENT)
Dept: PHYSICAL THERAPY | Facility: CLINIC | Age: 64
End: 2024-11-29
Payer: MEDICARE

## 2024-11-29 DIAGNOSIS — M17.12 PRIMARY OSTEOARTHRITIS OF LEFT KNEE: Primary | ICD-10-CM

## 2024-11-29 DIAGNOSIS — Z96.652 TOTAL KNEE REPLACEMENT STATUS, LEFT: ICD-10-CM

## 2024-11-29 PROCEDURE — 97140 MANUAL THERAPY 1/> REGIONS: CPT | Performed by: PHYSICAL THERAPIST

## 2024-11-29 PROCEDURE — 97110 THERAPEUTIC EXERCISES: CPT | Performed by: PHYSICAL THERAPIST

## 2024-11-29 NOTE — PROGRESS NOTES
Daily Note     Today's date: 2024  Patient name: Robert Benson  : 1960  MRN: [id number]  Referring provider: Drake Vasquez,*  Dx:   Encounter Diagnosis     ICD-10-CM    1. Primary osteoarthritis of left knee  M17.12       2. Total knee replacement status, left  Z96.652                      Subjective: patient reports he is having a hard time getting comfortable. Did not sleep well last night. Took Tylenol before PT today. He is still using ice. He is anxious to start using a cane as he would like to be able to walk around at his niece's wedding next Fri without a walker.       Objective: See treatment diary below      Assessment: Tolerated treatment well despite c/o feeling uncomfortable today. Continues to demonstrate limited knee ROM, lacks terminal knee extension with walking. Patient was WB through [employer] on RW when he arrived to PT, but after gait training and cueing was able to walk with step through pattern without WB through [employer], light hand hold assist on walker. Progressed exercises today to include LAQ, standing TKE with resistance band, HR/TR and step ups. Patient demonstrated fatigue post treatment, exhibited good technique with therapeutic exercises, and would benefit from continued PT. Goal is to have patient walking with SPC safely by end of next week.      Plan: Continue per plan of care.  Progress treatment as tolerated.  Updated HEP today and encouraged patient to continue to practice walking without depending on [employer]es on RW.      Precautions: none  CO-MORBIDITIES: HTN, h/o R TKA, diabetes, COPD, heart murmur  HEP ACCESS CODE: [id number] - updated 24  FOTO Completed On: 24    POC Expires Reeval for Medicare to be completed Unit LImit Auth Expiration Date PT/OT/STVisit Limit   25 By visit  11 N/a N/a N/a    Completed on                  TREATMENT DIARY  Auth Status DATE      NA Visit # 1 2 3 4      Remaining N/a        MANUAL THERAPY        "  L knee stretching   KG TM KG                                                  THERAPEUTIC EXERCISE HEP         Nustep for ROM and endurance    L2 x10 min L3 x10 mins     Ankle pumps x         Glute sets x         Quad sets x   5\" x20 5\" x20     Heel slides x   Supine w/ strap 5\" x20 Supine w/strap  5\" x20     Supine hip abduction x   W/ slide board x20 W/slide board  x20     LAQ x   NV 3\" 1x10     HR/TR          SAQ    5\" x10 5\" 2x10     Calf stretch     Supine w/strap  10\"x10                                                                 NEUROMUSCULAR REEDUCATION           Standing TKE with TB     Red TB  5\" x20                                                                                                                                       THERAPEUTIC ACTIVITY          Stair training  4 steps up/down with railing  X5 mins 4 steps up/down with railing and single crutch    8 mins       Mini squats          Step ups     4\" step  1x10               GAIT TRAINING          Level surface     W/RW  250 feet  Cues to focuse on heel strike, incr WB through L LE, and TKE      Trial SPC next week                                   MODALITIES         CP post tx prn   Seated with heel prop X8 min                                 Access Code: [email or web address]  URL: [email or web address]  Date: 11/29/2024  Prepared by: Margarita Steel    Exercises  - Long Sitting Calf Stretch with Strap  - 3 x daily - 7 x weekly - 10 reps - 10 sec hold  - Seated Long Arc Quad  - 3 x daily - 7 x weekly - 10 reps - 5 sec hold  - Heel Toe Raises with Counter Support  - 3 x daily - 7 x weekly - 20 reps  - Standing Terminal Knee Extension with Resistance  - 3 x daily - 7 x weekly - 20 reps - 5 sec hold  "

## 2024-12-01 LAB — B2 MICROGLOB SERPL-MCNC: 2.9 MG/L (ref 0.6–2.4)

## 2024-12-03 ENCOUNTER — APPOINTMENT (OUTPATIENT)
Dept: RADIOLOGY | Facility: CLINIC | Age: 64
End: 2024-12-03
Payer: MEDICARE

## 2024-12-03 ENCOUNTER — OFFICE VISIT (OUTPATIENT)
Dept: OBGYN CLINIC | Facility: CLINIC | Age: 64
End: 2024-12-03

## 2024-12-03 ENCOUNTER — OFFICE VISIT (OUTPATIENT)
Dept: PHYSICAL THERAPY | Facility: CLINIC | Age: 64
End: 2024-12-03
Payer: MEDICARE

## 2024-12-03 VITALS
SYSTOLIC BLOOD PRESSURE: 100 MMHG | DIASTOLIC BLOOD PRESSURE: 61 MMHG | WEIGHT: 252 LBS | BODY MASS INDEX: 34.13 KG/M2 | HEIGHT: 72 IN | HEART RATE: 126 BPM

## 2024-12-03 DIAGNOSIS — Z96.652 TOTAL KNEE REPLACEMENT STATUS, LEFT: ICD-10-CM

## 2024-12-03 DIAGNOSIS — Z96.652 S/P TOTAL KNEE REPLACEMENT, LEFT: ICD-10-CM

## 2024-12-03 DIAGNOSIS — M17.12 PRIMARY OSTEOARTHRITIS OF LEFT KNEE: Primary | ICD-10-CM

## 2024-12-03 DIAGNOSIS — Z96.652 S/P TOTAL KNEE REPLACEMENT, LEFT: Primary | ICD-10-CM

## 2024-12-03 PROCEDURE — 73562 X-RAY EXAM OF KNEE 3: CPT

## 2024-12-03 PROCEDURE — 97110 THERAPEUTIC EXERCISES: CPT

## 2024-12-03 PROCEDURE — 99024 POSTOP FOLLOW-UP VISIT: CPT | Performed by: ORTHOPAEDIC SURGERY

## 2024-12-03 PROCEDURE — 97140 MANUAL THERAPY 1/> REGIONS: CPT

## 2024-12-03 NOTE — PROGRESS NOTES
Assessment/Plan:  Assessment & Plan   Diagnoses and all orders for this visit:    S/P total knee replacement, left  -     XR knee 3 vw left non injury; Future        The patient is doing quite well in regards to his left total knee replacement.  Continue physical therapy.  He has 1 more day of injectables.  Start Ecotrin tomorrow.  Follow-up in 1 month for strength and motion check    Subjective:   Patient ID: Robert Benson is a 64 y.o. male.    HPI    The patient is status post left total knee replacement from 15 days ago.  His pain is minimizing.  He is ambulating with a single-point cane.  He denies any numbness or tingling.  He denies any fever or chills.    The following portions of the patient's history were reviewed and updated as appropriate: allergies, current medications, past family history, past medical history, past social history, past surgical history, and problem list.    Review of Systems   Constitutional:  Negative for chills, fever and unexpected weight change.   HENT:  Negative for hearing loss, nosebleeds and sore throat.    Eyes:  Negative for pain, redness and visual disturbance.   Respiratory:  Negative for cough, shortness of breath and wheezing.    Cardiovascular:  Negative for chest pain, palpitations and leg swelling.   Gastrointestinal:  Negative for abdominal pain, nausea and vomiting.   Endocrine: Negative for polydipsia and polyuria.   Genitourinary:  Negative for dysuria and hematuria.   Musculoskeletal:  Positive for arthralgias and myalgias. Negative for back pain, gait problem, joint swelling, neck pain and neck stiffness.        As noted in HPI   Skin:  Negative for rash and wound.   Neurological:  Negative for dizziness, numbness and headaches.   Psychiatric/Behavioral:  Negative for decreased concentration and suicidal ideas. The patient is not nervous/anxious.        Objective:  Ortho Exam    Left lower extremity is neuro vastly intact.  Toes are pink and mobile.  Compartments  are soft.  Incision is clean, dry, intact.  Negative Homans.  Flexion to nearly 100 degrees.  Collateral ligament function intact        I have personally reviewed pertinent films in PACS.    X-rays show a well-seated left total knee replacement without any loosening

## 2024-12-03 NOTE — PROGRESS NOTES
"Daily Note     Today's date: 12/3/2024  Patient name: Robert Benson  : 1960  MRN: [id number]  Referring provider: Drake Vasquez,*  Dx:   Encounter Diagnosis     ICD-10-CM    1. Primary osteoarthritis of left knee  M17.12       2. Total knee replacement status, left  Z96.652                      Subjective: Pt anxious to begin walking with SPC. Sleeping better now.      Objective: See treatment diary below      Assessment: Tolerated treatment well. GT with SPC today as documented, steady gait. Patient demonstrated fatigue post treatment, exhibited good technique with therapeutic exercises, and would benefit from continued PT      Plan: Continue per plan of care.      Precautions: none  CO-MORBIDITIES: HTN, h/o R TKA, diabetes, COPD, heart murmur  HEP ACCESS CODE: [email or web address] - updated 24  FOTO Completed On: 24    POC Expires Reeval for Medicare to be completed Unit LImit Auth Expiration Date PT/OT/STVisit Limit   25 By visit  11 N/a N/a N/a    Completed on                  TREATMENT DIARY  Auth Status DATE 11/14 11/21  11/26 11/29 12/3    NA Visit # 1 2 3 4 5     Remaining N/a        MANUAL THERAPY         L knee stretching   KG TM KG MJC                                                 THERAPEUTIC EXERCISE HEP         Nustep for ROM and endurance    L2 x10 min L3 x10 mins L3 x10 min    Ankle pumps x         Glute sets x         Quad sets x   5\" x20 5\" x20 5\" x20    Heel slides x   Supine w/ strap 5\" x20 Supine w/strap  5\" x20 Supine w/strap  5\" x20    Supine hip abduction x   W/ slide board x20 W/slide board  x20 W/slide board  x20    LAQ x   NV 3\" 1x10 3\" x10    HR/TR          SAQ    5\" x10 5\" 2x10 5\" 2x10    Calf stretch     Supine w/strap  10\"x10 Supine with strap  10\"x10                                                                NEUROMUSCULAR REEDUCATION           Standing TKE with TB     Red TB  5\" x20 RTB 5\"2x10                                                              " "                                                                        THERAPEUTIC ACTIVITY          Stair training  4 steps up/down with railing  X5 mins 4 steps up/down with railing and single crutch    8 mins       Mini squats          Step ups     4\" step  1x10 4\" x10 ea              GAIT TRAINING          Level surface     W/RW  250 feet  Cues to focuse on heel strike, incr WB through L LE, and TKE      Trial SPC next week SPC x60 ft                                  MODALITIES         CP post tx prn   Seated with heel prop X8 min   With heel prop x10 min                              "

## 2024-12-05 ENCOUNTER — OFFICE VISIT (OUTPATIENT)
Dept: PHYSICAL THERAPY | Facility: CLINIC | Age: 64
End: 2024-12-05
Payer: MEDICARE

## 2024-12-05 DIAGNOSIS — Z96.652 TOTAL KNEE REPLACEMENT STATUS, LEFT: ICD-10-CM

## 2024-12-05 DIAGNOSIS — M17.12 PRIMARY OSTEOARTHRITIS OF LEFT KNEE: Primary | ICD-10-CM

## 2024-12-05 PROCEDURE — 97112 NEUROMUSCULAR REEDUCATION: CPT

## 2024-12-05 PROCEDURE — 97140 MANUAL THERAPY 1/> REGIONS: CPT

## 2024-12-05 PROCEDURE — 97110 THERAPEUTIC EXERCISES: CPT

## 2024-12-05 NOTE — PROGRESS NOTES
"Daily Note     Today's date: 2024  Patient name: Robert Benson  : 1960  MRN: [id number]  Referring provider: Drake Vasquez,*  Dx:   Encounter Diagnosis     ICD-10-CM    1. Primary osteoarthritis of left knee  M17.12       2. Total knee replacement status, left  Z96.652                      Subjective: Pt has been amb with a SPC while at home. He does feel his strength of the LLE is increasing slowly. He only uses pain medication before coming to PT.       Objective: See treatment diary below      Assessment: Pt amb to PT using his SPC. Tolerated treatment well. He continues to lack TKE during manual therapy. Patient demonstrated fatigue post treatment, exhibited good technique with therapeutic exercises, and would benefit from continued PT. No adverse affect to CP during LLLD stretch.       Plan: Continue per plan of care.  Progress treatment as tolerated.       Precautions: none  CO-MORBIDITIES: HTN, h/o R TKA, diabetes, COPD, heart murmur  HEP ACCESS CODE: [email or web address] - updated 24  FOTO Completed On: 24    POC Expires Reeval for Medicare to be completed Unit LImit Auth Expiration Date PT/OT/STVisit Limit   25 By visit  11 N/a N/a N/a    Completed on                  TREATMENT DIARY  Auth Status DATE 11/14 11/21  11/26 11/29 12/3 12/5   NA Visit # 1 2 3 4 5 6    Remaining N/a        MANUAL THERAPY         L knee stretching   KG TM KG MJC TM                                                THERAPEUTIC EXERCISE HEP         Nustep for ROM and endurance    L2 x10 min L3 x10 mins L3 x10 min L4 x10 min   Ankle pumps x         Glute sets x         Quad sets x   5\" x20 5\" x20 5\" x20 5\" x20   Heel slides x   Supine w/ strap 5\" x20 Supine w/strap  5\" x20 Supine w/strap  5\" x20 Supine w/strap  5\" x20   SLR flex       2x5   Supine hip abduction x   W/ slide board x20 W/slide board  x20 W/slide board  x20 W/slide board  x20   LAQ x   NV 3\" 1x10 3\" x10 5\" 2x10   HR/TR          SAQ    5\" x10 " "5\" 2x10 5\" 2x10 5\" 2x10   Calf stretch     Supine w/strap  10\"x10 Supine with strap  10\"x10 Supine with strap  10\"x10                                                               NEUROMUSCULAR REEDUCATION           Standing TKE with TB     Red TB  5\" x20 RTB 5\"2x10 RTB 5\"2x10                                                                                                                                     THERAPEUTIC ACTIVITY          Stair training  4 steps up/down with railing  X5 mins 4 steps up/down with railing and single crutch    8 mins       Mini squats       2x10   Step ups     4\" step  1x10 4\" x10 ea 4\" 2x10 ea fwd/lat             GAIT TRAINING          Level surface     W/RW  250 feet  Cues to focuse on heel strike, incr WB through L LE, and TKE      Trial SPC next week SPC x60 ft Used to navigate gym                                 MODALITIES         CP post tx prn   Seated with heel prop X8 min   With heel prop x10 min With heel prop x10 min                               "

## 2024-12-06 ENCOUNTER — APPOINTMENT (OUTPATIENT)
Dept: PHYSICAL THERAPY | Facility: CLINIC | Age: 64
End: 2024-12-06
Payer: MEDICARE

## 2024-12-09 ENCOUNTER — OFFICE VISIT (OUTPATIENT)
Dept: PHYSICAL THERAPY | Facility: CLINIC | Age: 64
End: 2024-12-09
Payer: MEDICARE

## 2024-12-09 DIAGNOSIS — M17.12 PRIMARY OSTEOARTHRITIS OF LEFT KNEE: Primary | ICD-10-CM

## 2024-12-09 DIAGNOSIS — Z96.652 TOTAL KNEE REPLACEMENT STATUS, LEFT: ICD-10-CM

## 2024-12-09 PROCEDURE — 97110 THERAPEUTIC EXERCISES: CPT | Performed by: PHYSICAL THERAPIST

## 2024-12-09 NOTE — PROGRESS NOTES
"Daily Note     Today's date: 2024  Patient name: Robert Benson  : 1960  MRN: [id number]  Referring provider: Drake Vasquez,*  Dx:   Encounter Diagnosis     ICD-10-CM    1. Primary osteoarthritis of left knee  M17.12       2. Total knee replacement status, left  Z96.652                      Subjective: Patient reports he was able to use his cane at the family wedding on Friday. He was sore Saturday, but was feeling better yesterday. He is wondering if he can use heat to help with the stiffness he is feeling. He is also experiencing some L buttock and hip pain lately and is asking for stretches.       Objective: See treatment diary below      Assessment: Tolerated treatment well. Instructed on PPT, knee to chest and piriformis stretching to address sciatic nerve pain. Patient tolerated well. Patient demonstrated fatigue post treatment, exhibited good technique with therapeutic exercises, and would benefit from continued PT. Recommended patient use his heating pad over posterior knee prior to self stretches at home, then use ice for post-exercise activity.       Plan: Continue per plan of care.  Progress treatment as tolerated.       Precautions: none  CO-MORBIDITIES: HTN, h/o R TKA, diabetes, COPD, heart murmur  HEP ACCESS CODE: [email or web address] - updated 24  FOTO Completed On: 24    POC Expires Reeval for Medicare to be completed Unit LImit Auth Expiration Date PT/OT/STVisit Limit   25 By visit  11 N/a N/a N/a    Completed on                  TREATMENT DIARY  Auth Status DATE 12/9 11/21  11/26 11/29 12/3 12/5   NA Visit # 7 2 3 4 5 6    Remaining 4        MANUAL THERAPY         L knee stretching  KG KG TM KG MJC TM                                                THERAPEUTIC EXERCISE HEP         Nustep for ROM and endurance  L5 x10 mins  L2 x10 min L3 x10 mins L3 x10 min L4 x10 min   Ankle pumps x --        Glute sets x --        Quad sets x DC to HEP  5\" x20 5\" x20 5\" x20 5\" x20 " "  Heel slides x DC to HEP  Supine w/ strap 5\" x20 Supine w/strap  5\" x20 Supine w/strap  5\" x20 Supine w/strap  5\" x20   SLR flex  2x10     2x5   Supine hip abduction x Progress to standing NV  W/ slide board x20 W/slide board  x20 W/slide board  x20 W/slide board  x20   LAQ x 5\" x20  NV 3\" 1x10 3\" x10 5\" 2x10   HR/TR  X30 ea        SAQ  5\" x20  5\" x10 5\" 2x10 5\" 2x10 5\" 2x10   Calf stretch  Standing  Fitter board  30\"x3   Supine w/strap  10\"x10 Supine with strap  10\"x10 Supine with strap  10\"x10   Piriformis stretch  With PT assist  30\"x3        Single knee to chest  With towel assist  5\" x10                                                NEUROMUSCULAR REEDUCATION           Standing TKE with TB     Red TB  5\" x20 RTB 5\"2x10 RTB 5\"2x10   PPT  5\" x20        Bridges          Supine Iso hip abduction with TB  Blue TB  5\" x20                                                                                                            THERAPEUTIC ACTIVITY          Stair training   4 steps up/down with railing and single crutch    8 mins       Mini squats  1x10     2x10   Step ups  6\" step fwd  2x10    Resume lateral NV   4\" step  1x10 4\" x10 ea 4\" 2x10 ea fwd/lat             GAIT TRAINING          Level surface     W/RW  250 feet  Cues to focuse on heel strike, incr WB through L LE, and TKE      Trial SPC next week SPC x60 ft Used to navigate gym                                 MODALITIES         CP post tx prn   Seated with heel prop X8 min   With heel prop x10 min With heel prop x10 min                                 "

## 2024-12-10 ENCOUNTER — APPOINTMENT (OUTPATIENT)
Dept: PHYSICAL THERAPY | Facility: CLINIC | Age: 64
End: 2024-12-10
Payer: MEDICARE

## 2024-12-12 ENCOUNTER — OFFICE VISIT (OUTPATIENT)
Dept: PHYSICAL THERAPY | Facility: CLINIC | Age: 64
End: 2024-12-12
Payer: MEDICARE

## 2024-12-12 DIAGNOSIS — Z96.652 TOTAL KNEE REPLACEMENT STATUS, LEFT: ICD-10-CM

## 2024-12-12 DIAGNOSIS — M17.12 PRIMARY OSTEOARTHRITIS OF LEFT KNEE: Primary | ICD-10-CM

## 2024-12-12 PROCEDURE — 97140 MANUAL THERAPY 1/> REGIONS: CPT

## 2024-12-12 PROCEDURE — 97110 THERAPEUTIC EXERCISES: CPT

## 2024-12-12 PROCEDURE — 97112 NEUROMUSCULAR REEDUCATION: CPT

## 2024-12-12 NOTE — PROGRESS NOTES
"Daily Note     Today's date: 2024  Patient name: Robert Benson  : 1960  MRN: [id number]  Referring provider: Drake Vasquez,*  Dx:   Encounter Diagnosis     ICD-10-CM    1. Primary osteoarthritis of left knee  M17.12       2. Total knee replacement status, left  Z96.652                      Subjective: Pt reports today is a good day. No c/o sciatic pain.      Objective: See treatment diary below      Assessment: Tolerated treatment well. Incision site inspected, steri strips intact, no signs of infection noted. Encouraged patient to continue with R knee extension stretch at home. Patient demonstrated fatigue post treatment, exhibited good technique with therapeutic exercises, and would benefit from continued PT. Pt may benefit from Upright bike text visit to increase ROM.       Plan: Continue per plan of care.      Precautions: none  CO-MORBIDITIES: HTN, h/o R TKA, diabetes, COPD, heart murmur  HEP ACCESS CODE: [id number] - updated 24  FOTO Completed On: 24    POC Expires Reeval for Medicare to be completed Unit LImit Auth Expiration Date PT/OT/STVisit Limit   25 By visit  11 N/a N/a N/a    Completed on                  TREATMENT DIARY  Auth Status DATE 12/9 12/12 11/26 11/29 12/3 12/5   NA Visit # 7 8 3 4 5 6    Remaining 4 3       MANUAL THERAPY         L knee stretching  KG MJC TM KG MJC TM                                                THERAPEUTIC EXERCISE HEP         Nustep for ROM and endurance  L5 x10 mins L5 x10 min L2 x10 min L3 x10 mins L3 x10 min L4 x10 min   Ankle pumps x --        Glute sets x --        Quad sets x DC to HEP  5\" x20 5\" x20 5\" x20 5\" x20   Heel slides x DC to HEP  Supine w/ strap 5\" x20 Supine w/strap  5\" x20 Supine w/strap  5\" x20 Supine w/strap  5\" x20   SLR flex  2x10 NV    2x5   Supine hip abduction x Progress to standing NV 3 way standing   B/L x10 ea W/ slide board x20 W/slide board  x20 W/slide board  x20 W/slide board  x20   LAQ x 5\" x20 " "5\" x20 NV 3\" 1x10 3\" x10 5\" 2x10   HR/TR  X30 ea x30       SAQ  5\" x20 Small  Roll  2x10 5\" 5\" x10 5\" 2x10 5\" 2x10 5\" 2x10   Calf stretch  Standing  Fitter board  30\"x3 Standing  Fitter board  30\"x3  Supine w/strap  10\"x10 Supine with strap  10\"x10 Supine with strap  10\"x10   Piriformis stretch  With PT assist  30\"x3 With PT assist  30\"x3       Single knee to chest  With towel assist  5\" x10 With towel assist  5\" x10                                               NEUROMUSCULAR REEDUCATION           Standing TKE with TB     Red TB  5\" x20 RTB 5\"2x10 RTB 5\"2x10   PPT  5\" x20 5\" x20       Bridges          Supine Iso hip abduction with TB  Blue TB  5\" x20 Blue TB  5\" x20                                                                                                           THERAPEUTIC ACTIVITY          Stair training          Mini squats  1x10     2x10   Step ups  6\" step fwd  2x10    Resume lateral NV 6\" step fwd  2x10    6\" lat  x10  4\" step  1x10 4\" x10 ea 4\" 2x10 ea fwd/lat             GAIT TRAINING          Level surface     W/RW  250 feet  Cues to focuse on heel strike, incr WB through L LE, and TKE      Trial SPC next week SPC x60 ft Used to navigate gym                                 MODALITIES         CP post tx prn  With heel prop x10 min Seated with heel prop X8 min   With heel prop x10 min With heel prop x10 min                                   "

## 2024-12-13 ENCOUNTER — APPOINTMENT (OUTPATIENT)
Dept: PHYSICAL THERAPY | Facility: CLINIC | Age: 64
End: 2024-12-13
Payer: MEDICARE

## 2024-12-16 DIAGNOSIS — Z72.0 TOBACCO USE: ICD-10-CM

## 2024-12-17 ENCOUNTER — OFFICE VISIT (OUTPATIENT)
Dept: PHYSICAL THERAPY | Facility: CLINIC | Age: 64
End: 2024-12-17
Payer: MEDICARE

## 2024-12-17 DIAGNOSIS — Z96.652 TOTAL KNEE REPLACEMENT STATUS, LEFT: ICD-10-CM

## 2024-12-17 DIAGNOSIS — M17.12 PRIMARY OSTEOARTHRITIS OF LEFT KNEE: Primary | ICD-10-CM

## 2024-12-17 PROCEDURE — 97140 MANUAL THERAPY 1/> REGIONS: CPT

## 2024-12-17 PROCEDURE — 97110 THERAPEUTIC EXERCISES: CPT

## 2024-12-17 PROCEDURE — 97112 NEUROMUSCULAR REEDUCATION: CPT

## 2024-12-17 RX ORDER — POLYETHYLENE GLYCOL 3350 17 G
2 POWDER IN PACKET (EA) ORAL AS NEEDED
Qty: 81 LOZENGE | Refills: 3 | Status: SHIPPED | OUTPATIENT
Start: 2024-12-17

## 2024-12-17 NOTE — PROGRESS NOTES
"Daily Note     Today's date: 2024  Patient name: Robert Benson  : 1960  MRN: [id number]  Referring provider: Drake Vasquez,*  Dx:   Encounter Diagnosis     ICD-10-CM    1. Primary osteoarthritis of left knee  M17.12       2. Total knee replacement status, left  Z96.652                      Subjective: Pt is pleased with his progress this far. He continues to have less pain. His LBP has also decreased.       Objective: See treatment diary below      Assessment: Tolerated treatment well. He does remain slightly limited in TKE during manual therapy. Patient demonstrated fatigue post treatment, exhibited good technique with therapeutic exercises, and would benefit from continued PT      Plan: Continue per plan of care.  Progress treatment as tolerated.       Precautions: none  CO-MORBIDITIES: HTN, h/o R TKA, diabetes, COPD, heart murmur  HEP ACCESS CODE: [id number] - updated 24  FOTO Completed On: 24    POC Expires Reeval for Medicare to be completed Unit LImit Auth Expiration Date PT/OT/STVisit Limit   25 By visit  11 N/a N/a N/a    Completed on                  TREATMENT DIARY  Auth Status DATE 12/9 12/12 12/17 11/29 12/3 12/5   NA Visit # 7 8 9 4 5 6    Remaining 4 3 2      MANUAL THERAPY         L knee stretching  KG MJC TM KG MJC TM                                                THERAPEUTIC EXERCISE HEP         Nustep for ROM and endurance  L5 x10 mins L5 x10 min L7 x10 min L3 x10 mins L3 x10 min L4 x10 min   Ankle pumps x --        Glute sets x --        Quad sets x DC to HEP   5\" x20 5\" x20 5\" x20   Heel slides x DC to HEP   Supine w/strap  5\" x20 Supine w/strap  5\" x20 Supine w/strap  5\" x20   SLR flex  2x10 NV 2x10   2x5   Supine hip abduction x Progress to standing NV 3 way standing   B/L x10 ea 3 way standing   B/L 2x10 ea W/slide board  x20 W/slide board  x20 W/slide board  x20   LAQ x 5\" x20 5\" x20 5\" x20 3\" 1x10 3\" x10 5\" 2x10   HR/TR  X30 ea x30 x30      SAQ  5\" " "x20 Small  Roll  2x10 5\" 5\" x20 5\" 2x10 5\" 2x10 5\" 2x10   Calf stretch  Standing  Fitter board  30\"x3 Standing  Fitter board  30\"x3 Standing  Fitter board  30\"x3 Supine w/strap  10\"x10 Supine with strap  10\"x10 Supine with strap  10\"x10   Piriformis stretch  With PT assist  30\"x3 With PT assist  30\"x3 With towel assist  30\"x3      Single knee to chest  With towel assist  5\" x10 With towel assist  5\" x10 With towel assist  5\" x10                                              NEUROMUSCULAR REEDUCATION           Standing TKE with TB     Red TB  5\" x20 RTB 5\"2x10 RTB 5\"2x10   PPT  5\" x20 5\" x20 5\" x20      Bridges    NV      Supine Iso hip abduction with TB  Blue TB  5\" x20 Blue TB  5\" x20 Blue TB  5\" x20                                                                                                          THERAPEUTIC ACTIVITY          Stair training          Mini squats  1x10  2x10   2x10   Step ups  6\" step fwd  2x10    Resume lateral NV 6\" step fwd  2x10    6\" step lat  x10 6\" step fwd  2x10    6\" step lat  2x10 4\" step  1x10 4\" x10 ea 4\" 2x10 ea fwd/lat             GAIT TRAINING          Level surface     W/RW  250 feet  Cues to focuse on heel strike, incr WB through L LE, and TKE      Trial SPC next week SPC x60 ft Used to navigate gym                                 MODALITIES         CP post tx prn  With heel prop x10 min   With heel prop x10 min With heel prop x10 min                                     "

## 2024-12-18 ENCOUNTER — OFFICE VISIT (OUTPATIENT)
Dept: HEMATOLOGY ONCOLOGY | Facility: CLINIC | Age: 64
End: 2024-12-18
Payer: MEDICARE

## 2024-12-18 VITALS
OXYGEN SATURATION: 97 % | DIASTOLIC BLOOD PRESSURE: 80 MMHG | HEIGHT: 72 IN | HEART RATE: 91 BPM | RESPIRATION RATE: 18 BRPM | SYSTOLIC BLOOD PRESSURE: 122 MMHG | BODY MASS INDEX: 34.18 KG/M2 | TEMPERATURE: 98 F

## 2024-12-18 DIAGNOSIS — R91.1 LUNG NODULE: ICD-10-CM

## 2024-12-18 DIAGNOSIS — D47.2 MGUS (MONOCLONAL GAMMOPATHY OF UNKNOWN SIGNIFICANCE): Primary | ICD-10-CM

## 2024-12-18 DIAGNOSIS — Z87.891 PERSONAL HISTORY OF NICOTINE DEPENDENCE: ICD-10-CM

## 2024-12-18 PROCEDURE — G2211 COMPLEX E/M VISIT ADD ON: HCPCS | Performed by: INTERNAL MEDICINE

## 2024-12-18 PROCEDURE — 99214 OFFICE O/P EST MOD 30 MIN: CPT | Performed by: INTERNAL MEDICINE

## 2024-12-18 NOTE — PROGRESS NOTES
Hematology/Oncology Outpatient Follow-up  Robert Benson 64 y.o. male 1960 [id number]    Date:  12/18/2024        Assessment and Plan:  1. MGUS (monoclonal gammopathy of unknown significance) (Primary)  History of IgM kappa gammopathy.  Recent blood work showed mild increase of his IgM level without increase of the light chain concentration.  The patient was told that we will continue to monitor him closely.  There is no need to pursue further workup like a bone marrow biopsy at least for the time being since there is no obvious hint of progression.  - CBC and differential; Future  - Comprehensive metabolic panel; Future  - Magnesium; Future  - IgG, IgA, IgM; Future  - Beta 2 microglobulin, serum; Future  - Immunoglobulin free LT chains blood; Future  - IMMUNOFIXATION ([place]) AND PROTEIN ELECTROPHORESIS, RANDOM URINE; Future  - Protein, Total and Protein Electrophoresis with Immunofixation; Future  - [place]/Lambda Light Chains Free With Ratio,Urine; Future    2. Lung nodule  He had a CT scan of the chest without contrast on 6/6/2024 which showed stable 6 mm intrafissural node along the right minor fissure. There is also stable diffuse sub-5 mm groundglass nodules likely representing smoking-related interstitial lung disease.  We will pursue low-dose lung CT scan prior to his office visit in 6 months from now.- CT lung screening program; Future    3. Personal history of nicotine dependence  We did briefly discuss smoking cessation.  - CT lung screening program; Future        HPI:  Patient is a pleasant 64-year-old male who originally presented for further evaluation of his abnormal SPEP/serum free light chains: referred by nephrology.  He has past medical history of hypertension, diabetes, achalasia, GERD and chronic hyponatremia.  He does admit to smoking cigars.  Has decent family history of malignancies and a niece who harbors BRCA mutation; he did have genetic testing in the past which came back negative.     He  states that around 5/2023 he presented to the hospital with reports of significant back pain.  The back pain resolved however since that time he has been referred to multiple specialists including urology, surgery nephrology due to incidental findings.  Established care with a new PCP recently as well.  Was found to have the abnormal SPEP/free light chains with nephrology work-up.     Labs 10/6/2023 showed sodium 133, creatinine 1.22, GFR 66, calcium normal 9.8 remaining metabolic panel is normal.  Urinalysis showed +1 protein.  His serum protein electrophoresis showed a faint band in the gamma region suspicious for monoclonal protein however immunofixation was not performed.  His urine protein electrophoresis was negative for monoclonal protein.  TSH normal 0.53.  Serum free light chain showed elevated kappa 52.7 mg/L, normal lambda 22.4 mg/L with slightly elevated kappa to lambda ratio 2.35.  Most recent CBC available for review 5/9/2023 showed normal white cells and platelets, he was not anemic H&H 13.7/41.2, MCV 86.         Interval history:  The patient came today for a follow-up visit accompanied by his wife.  He stated that he had left knee replacement surgery about a month ago which went without significant complications.  Recent blood work on 11/26/2024 showed hemoglobin of 11.5 with normal white cell count of 9.7.  Platelet count 412.  White cell differential was normal.  Creatinine 1.2 with normal calcium and liver enzymes.  Immunoglobulins showed IgM of 804.  Serum light chain concentration for kappa was 47 and for lambda was 27 mg/L with a ratio of 1.7.  Beta-2 microglobulin was 2.9.  ROS: Review of Systems   Constitutional:  Negative for chills and fever.   HENT:  Positive for hearing loss. Negative for ear pain and sore throat.    Eyes:  Negative for pain and visual disturbance.   Respiratory:  Positive for cough. Negative for shortness of breath.    Cardiovascular:  Negative for chest pain and  palpitations.   Gastrointestinal:  Negative for abdominal pain and vomiting.   Genitourinary:  Negative for dysuria and hematuria.   Musculoskeletal:  Negative for arthralgias and back pain.   Skin:  Negative for color change and rash.   Neurological:  Positive for numbness. Negative for seizures and syncope.   Psychiatric/Behavioral:  Positive for sleep disturbance.    All other systems reviewed and are negative.      Past Medical History:   Diagnosis Date    Bilateral flank pain 05/11/2023    CAD (coronary artery disease)     nonobstructive RCA via CT 2013    CKD (chronic kidney disease)     Colon polyp     COPD (chronic obstructive pulmonary disease) ([facility])     CPAP (continuous positive airway pressure) dependence     Diabetes mellitus (HCC)     GERD (gastroesophageal reflux disease)     Heart murmur     Hyperlipidemia     Hypertension     PAF (paroxysmal atrial fibrillation) ([facility])     PONV (postoperative nausea and vomiting)     Sleep apnea     cpap    Wears hearing aid        Past Surgical History:   Procedure Laterality Date    APPENDECTOMY N/A     COLONOSCOPY      JOINT REPLACEMENT Right 2014    Knee    MYOTOMY HELLER LAPAROSCOPIC  2018    [employer]    [place] ARTHRP KNE CONDYLE&PLATU MEDIAL&LAT COMPARTMENTS Left 11/18/2024    Procedure: ARTHROPLASTY KNEE TOTAL;  Surgeon: Ehsan Rodriguez DO;  Location: [facility];  Service: Orthopedics    STOMACH SURGERY         Social History     Socioeconomic History    Marital status: /Civil Union     Spouse name: None    Number of children: None    Years of education: None    Highest education level: None   Occupational History    None   Tobacco Use    Smoking status: Some Days     Types: Cigars     Passive exposure: Past    Smokeless tobacco: Never    Tobacco comments:     Cigars 5/weeks   Vaping Use    Vaping status: Never Used   Substance and Sexual Activity    Alcohol use: Not Currently     Comment: rarely    Drug use: Never    Sexual activity: Not Currently   Other  Topics Concern    None   Social History Narrative    None     Social Drivers of Health     Financial Resource Strain: Not on file   Food Insecurity: No Food Insecurity (2024)    Nursing - Inadequate Food Risk Classification     Worried About Running Out of Food in the Last Year: Never true     Ran Out of Food in the Last Year: Never true     Ran Out of Food in the Last Year: 1   Transportation Needs: No Transportation Needs (2024)    Nursing - Transportation Risk Classification     Lack of Transportation: Not on file     Lack of Transportation: 2   Physical Activity: Not on file   Stress: Not on file   Social Connections: Not on file   Intimate Partner Violence: Unknown (2024)    Nursing IPS     Feels Physically and Emotionally Safe: Not on file     Physically Hurt by Someone: Not on file     Humiliated or Emotionally Abused by Someone: Not on file     Physically Hurt by Someone: 2     Hurt or Threatened by Someone: 2   Housing Stability: Unknown (2024)    Nursing: Inadequate Housing Risk Classification     Has Housing: Not on file     Worried About Losing Housing: Not on file     Unable to Get Utilities: Not on file     Unable to Pay for Housing in the Last Year: 2     Has Housin       Family History   Problem Relation Age of Onset    Cancer Mother         stomach    Heart attack Father     Cancer Sister         bladder    Cancer Brother         colon       No Known Allergies      Current Outpatient Medications:     acetaminophen (TYLENOL) 325 mg tablet, Take 2 tablets (650 mg total) by mouth every 4 (four) hours as needed for mild pain (fever > 100.5), Disp: , Rfl:     atorvastatin (LIPITOR) 40 mg tablet, Take 1 tablet (40 mg total) by mouth every evening, Disp: 90 tablet, Rfl: 5    docusate sodium (COLACE) 100 mg capsule, Take 1 capsule (100 mg total) by mouth 2 (two) times a day, Disp: 60 capsule, Rfl: 0    esomeprazole (NexIUM) 40 MG capsule, Take 1 capsule (40 mg total) by mouth in  the morning, Disp: 90 capsule, Rfl: 3    fenofibrate (TRICOR) 145 mg tablet, Take 1 tablet (145 mg total) by mouth daily, Disp: 100 tablet, Rfl: 1    ferrous sulfate 324 (65 Fe) mg, TAKE 1 TABLET BY MOUTH 2 TIMES A DAY BEFORE MEALS DO NOT START BEFORE OCTOBER 17, 2024., Disp: 60 tablet, Rfl: 5    Jardiance 10 MG TABS tablet, TAKE 1 TABLET BY MOUTH EVERY DAY, Disp: 90 tablet, Rfl: 1    lisinopril (ZESTRIL) 5 mg tablet, Take 1 tablet (5 mg total) by mouth daily, Disp: 90 tablet, Rfl: 5    metFORMIN (GLUCOPHAGE) 500 mg tablet, Take 1 tablet (500 mg total) by mouth daily with breakfast, Disp: 90 tablet, Rfl: 3    nicotine polacrilex (Nicotine Mini) 2 MG lozenge, Apply 1 lozenge (2 mg total) to the mouth or throat as needed for smoking cessation, Disp: 81 lozenge, Rfl: 3    sodium chloride 1 g tablet, TAKE 1 TABLET (1 G TOTAL) BY MOUTH EVERY OTHER DAY, Disp: 45 tablet, Rfl: 1    amoxicillin (AMOXIL) 500 mg capsule, Take 2,000 mg by mouth as needed 1 hour prior to dental appointment (Patient not taking: Reported on 10/15/2024), Disp: , Rfl:     ascorbic acid (VITAMIN C) 500 MG tablet, Take 1 tablet (500 mg total) by mouth 2 (two) times a day Do not start before October 17, 2024., Disp: 60 tablet, Rfl: 1    folic acid (FOLVITE) 1 mg tablet, Take 1 tablet (1 mg total) by mouth daily Do not start before October 17, 2024., Disp: 30 tablet, Rfl: 1    fondaparinux (ARIXTRA) 2.5 mg/0.5 mL, Inject 2.5 mg under the skin every 24 hours for 14 days, Disp: 7 mL, Rfl: 0    Multiple Vitamins-Minerals (multivitamin with minerals) tablet, Take 1 tablet by mouth daily Do not start before October 17, 2024., Disp: 30 tablet, Rfl: 1      Physical Exam:  /80 (BP Location: Right arm, Patient Position: Sitting, Cuff Size: Adult)   Pulse 91   Temp 98 °F (36.7 °C)   Resp 18   Ht 6' (1.829 m)   SpO2 97%   BMI 34.18 kg/m²     Physical Exam  Constitutional:       Appearance: He is well-developed.   HENT:      Head: Normocephalic and  atraumatic.      Nose: Nose normal.   Eyes:      General: No scleral icterus.        Right eye: No discharge.         Left eye: No discharge.      Conjunctiva/sclera: Conjunctivae normal.      Pupils: Pupils are equal, round, and reactive to light.   Neck:      Thyroid: No thyromegaly.      Trachea: No tracheal deviation.   Cardiovascular:      Rate and Rhythm: Normal rate and regular rhythm.      Heart sounds: Normal heart sounds. No murmur heard.     No friction rub.   Pulmonary:      Effort: Pulmonary effort is normal. No respiratory distress.      Breath sounds: Normal breath sounds. No wheezing or rales.   Chest:      Chest wall: No tenderness.   Abdominal:      General: There is no distension.      Palpations: Abdomen is soft. There is no hepatomegaly or splenomegaly.      Tenderness: There is no abdominal tenderness. There is no guarding or rebound.   Musculoskeletal:         General: No tenderness or deformity.      Cervical back: Normal range of motion and neck supple.      Comments: Ambulatory dysfunction uses the cane.   Lymphadenopathy:      Cervical: No cervical adenopathy.   Skin:     General: Skin is warm and dry.      Coloration: Skin is not pale.      Findings: No erythema or rash.   Neurological:      Mental Status: He is alert and oriented to person, place, and time.      Cranial Nerves: No cranial nerve deficit.      Coordination: Coordination normal.      Deep Tendon Reflexes: Reflexes are normal and symmetric.   Psychiatric:         Behavior: Behavior normal.         Thought Content: Thought content normal.         Judgment: Judgment normal.           Labs:  Lab Results   Component Value Date    WBC 9.72 11/26/2024    HGB 11.5 (L) 11/26/2024    HCT 34.5 (L) 11/26/2024    MCV 85 11/26/2024     (H) 11/26/2024     Lab Results   Component Value Date    K 3.9 11/26/2024    CL 94 (L) 11/26/2024    CO2 26 11/26/2024    BUN 21 11/26/2024    CREATININE 1.21 11/26/2024    GLUF 117 (H) 11/26/2024     CALCIUM 9.5 11/26/2024    AST 26 11/26/2024    ALT 24 11/26/2024    ALKPHOS 50 11/26/2024    EGFR 62 11/26/2024     Lab Results   Component Value Date    TSH 1.11 12/12/2019       Patient voiced understanding and agreement in the above discussion. Aware to contact our office with questions/symptoms in the interim.

## 2024-12-20 ENCOUNTER — EVALUATION (OUTPATIENT)
Dept: PHYSICAL THERAPY | Facility: CLINIC | Age: 64
End: 2024-12-20
Payer: MEDICARE

## 2024-12-20 DIAGNOSIS — Z96.652 TOTAL KNEE REPLACEMENT STATUS, LEFT: ICD-10-CM

## 2024-12-20 DIAGNOSIS — M17.12 PRIMARY OSTEOARTHRITIS OF LEFT KNEE: Primary | ICD-10-CM

## 2024-12-20 PROCEDURE — 97112 NEUROMUSCULAR REEDUCATION: CPT | Performed by: PHYSICAL MEDICINE & REHABILITATION

## 2024-12-20 PROCEDURE — 97140 MANUAL THERAPY 1/> REGIONS: CPT | Performed by: PHYSICAL MEDICINE & REHABILITATION

## 2024-12-20 PROCEDURE — 97110 THERAPEUTIC EXERCISES: CPT | Performed by: PHYSICAL MEDICINE & REHABILITATION

## 2024-12-24 ENCOUNTER — OFFICE VISIT (OUTPATIENT)
Dept: PHYSICAL THERAPY | Facility: CLINIC | Age: 64
End: 2024-12-24
Payer: MEDICARE

## 2024-12-24 DIAGNOSIS — M17.12 PRIMARY OSTEOARTHRITIS OF LEFT KNEE: Primary | ICD-10-CM

## 2024-12-24 DIAGNOSIS — Z96.652 TOTAL KNEE REPLACEMENT STATUS, LEFT: ICD-10-CM

## 2024-12-24 PROCEDURE — 97112 NEUROMUSCULAR REEDUCATION: CPT

## 2024-12-24 PROCEDURE — 97530 THERAPEUTIC ACTIVITIES: CPT

## 2024-12-24 PROCEDURE — 97110 THERAPEUTIC EXERCISES: CPT

## 2024-12-24 NOTE — PROGRESS NOTES
Daily Note     Today's date: 2024  Patient name: Robert Benson  : 1960  MRN: [id number]  Referring provider: Drake Vasquez,*  Dx:   Encounter Diagnosis     ICD-10-CM    1. Primary osteoarthritis of left knee  M17.12       2. Total knee replacement status, left  Z96.652           Start Time: 1055  Stop Time: 1150  Total time in clinic (min): 55 minutes    Subjective: Pt reports he was pretty sore for the rest of the day following his LV, but then improved.  Has seen significant progress in L knee since LV.  Has been sleeping much better.  Walking short distances at home without SPC.      Objective: See treatment diary below      Assessment: Tolerated treatment well. Continued with program as outlined below.  Is making very good progress with overall strength and ROM of L knee.  Greatest mobility limitation is TKE of L knee, both actively and passively.  Able to progress with addition of bridges to further improve glute strength.  Some visible signs of fatigue post treatment.  Patient would benefit from continued PT.      Plan: Continue per plan of care.      Precautions: none  CO-MORBIDITIES: HTN, h/o R TKA, diabetes, COPD, heart murmur  HEP ACCESS CODE: n/a (pre-op handout)  FOTO Completed On: 24    POC Expires Reeval for Medicare to be completed Unit LImit Auth Expiration Date PT/OT/STVisit Limit   25 By visit  11 N/a N/a N/a    Completed on                  POC Expires Reeval for Medicare to be completed Unit LImit Auth Expiration Date PT/OT/STVisit Limit   25 By visit  11 N/a N/a N/a    Completed on                TREATMENT DIARY  Auth Status DATE     NA Visit # 7 8 9 10 11     Remaining 4 3 2 1 10    MANUAL THERAPY         L knee stretching  KG MJC TM LH AFB        RE, LH                                         THERAPEUTIC EXERCISE HEP         Nustep for ROM and endurance  L5 x10 mins L5 x10 min L7 x10 min L7, 10' L8 x 10'    Ankle  "pumps x --        Glute sets x --        Quad sets x DC to HEP        Heel slides x DC to HEP        SLR flex  2x10 NV 2x10  2x10    Supine hip abduction x Progress to standing NV 3 way standing   B/L x10 ea 3 way standing   B/L 2x10 ea  Abd/ext  standing   B/L 2x10 ea    LAQ x 5\" x20 5\" x20   5\" x20  5\"x30    HR/TR  X30 ea x30 x30  X30 ea    SAQ  5\" x20 Small  Roll  2x10 5\" 5\" x20      Calf stretch  Standing  Fitter board  30\"x3 Standing  Fitter board  30\"x3 Standing  Fitter board  30\"x3  Standing  SB  30\"x4      Piriformis stretch  With PT assist  30\"x3 With PT assist  30\"x3 With towel assist  30\"x3      Single knee to chest  With towel assist  5\" x10 With towel assist  5\" x10 With towel assist  5\" x10  With towel assist  5\" x10                                            NEUROMUSCULAR REEDUCATION      12/20     Standing TKE with TB          PPT  5\" x20 5\" x20 5\" x20  5\"x20    Bridges    NV  5\" x20    Supine Iso hip abduction with TB  Blue TB  5\" x20 Blue TB  5\" x20 Blue TB  5\" x20  Blue TB  5\" x20                                                                                                        THERAPEUTIC ACTIVITY     12/20     Stair training          Mini squats  1x10  2x10  2x10    Step ups  6\" step fwd  2x10    Resume lateral NV 6\" step fwd  2x10    6\" step lat  x10 6\" step fwd  2x10    6\" step lat  2x10           Stairclimbing 3x Stairclimbing 5x    GAIT TRAINING          Level surface                                        MODALITIES         CP post tx prn  With heel prop x10 min                         "

## 2024-12-26 ENCOUNTER — OFFICE VISIT (OUTPATIENT)
Dept: PHYSICAL THERAPY | Facility: CLINIC | Age: 64
End: 2024-12-26
Payer: MEDICARE

## 2024-12-26 DIAGNOSIS — Z96.652 TOTAL KNEE REPLACEMENT STATUS, LEFT: ICD-10-CM

## 2024-12-26 DIAGNOSIS — M17.12 PRIMARY OSTEOARTHRITIS OF LEFT KNEE: Primary | ICD-10-CM

## 2024-12-26 PROCEDURE — 97112 NEUROMUSCULAR REEDUCATION: CPT | Performed by: PHYSICAL THERAPIST

## 2024-12-26 PROCEDURE — 97110 THERAPEUTIC EXERCISES: CPT | Performed by: PHYSICAL THERAPIST

## 2024-12-26 NOTE — PROGRESS NOTES
Daily Note     Today's date: 2024  Patient name: Robert Benson  : 1960  MRN: [id number]  Referring provider: Drake Vasquez,*  Dx:   Encounter Diagnosis     ICD-10-CM    1. Primary osteoarthritis of left knee  M17.12       2. Total knee replacement status, left  Z96.652                      Subjective: Pt reports that his knee is doing well.       Objective: See treatment diary below      Assessment: Tolerated treatment well. Patient demonstrated fatigue post treatment, exhibited good technique with therapeutic exercises, and would benefit from continued PT. He continued to have difficulty with maintaining TKE during program. He was able to self progress this visit. Increase weight NV.       Plan: Continue per plan of care.  Progress treatment as tolerated.       Precautions: none  CO-MORBIDITIES: HTN, h/o R TKA, diabetes, COPD, heart murmur  HEP ACCESS CODE: n/a (pre-op handout)  FOTO Completed On: 24    POC Expires Reeval for Medicare to be completed Unit LImit Auth Expiration Date PT/OT/STVisit Limit   25 By visit  11 N/a N/a N/a    Completed on                  POC Expires Reeval for Medicare to be completed Unit LImit Auth Expiration Date PT/OT/STVisit Limit   25 By visit  11 N/a N/a N/a    Completed on                TREATMENT DIARY  Auth Status DATE    NA Visit # 7 8 9 10 11 12    Remaining 4 3 2 1 10 9   MANUAL THERAPY         L knee stretching  KG MJC TM LH AFB KB       RE, LH                                         THERAPEUTIC EXERCISE HEP         Nustep for ROM and endurance  L5 x10 mins L5 x10 min L7 x10 min L7, 10' L8 x 10' L8 x 10'   Ankle pumps x --        Glute sets x --        Quad sets x DC to HEP        Heel slides x DC to HEP        SLR flex  2x10 NV 2x10  2x10 2x10   Supine hip abduction x Progress to standing NV 3 way standing   B/L x10 ea 3 way standing   B/L 2x10 ea  Abd/ext  standing   B/L 2x10 ea  "Abd/ext  standing   B/L 3x10 ea   LAQ x 5\" x20 5\" x20   5\" x20  5\"x30 5\"x30 (weight NV)   HR/TR  X30 ea x30 x30  X30 ea X30 ea   SAQ  5\" x20 Small  Roll  2x10 5\" 5\" x20      Calf stretch  Standing  Fitter board  30\"x3 Standing  Fitter board  30\"x3 Standing  Fitter board  30\"x3  Standing  SB  30\"x4   Standing  SB  30\"x5   Piriformis stretch  With PT assist  30\"x3 With PT assist  30\"x3 With towel assist  30\"x3      Single knee to chest  With towel assist  5\" x10 With towel assist  5\" x10 With towel assist  5\" x10  With towel assist  5\" x10 With towel assist  5\" x10                                           NEUROMUSCULAR REEDUCATION      12/20     Standing TKE with TB          PPT  5\" x20 5\" x20 5\" x20  5\"x20 5\"x20   Bridges    NV  5\" x20 5\" x20   Supine Iso hip abduction with TB  Blue TB  5\" x20 Blue TB  5\" x20 Blue TB  5\" x20  Blue TB  5\" x20 Blue TB  5\" x20                                                                                                       THERAPEUTIC ACTIVITY     12/20     Stair training          Mini squats  1x10  2x10  2x10 4x10   Step ups  6\" step fwd  2x10    Resume lateral NV 6\" step fwd  2x10    6\" step lat  x10 6\" step fwd  2x10    6\" step lat  2x10           Stairclimbing 3x Stairclimbing 5x Stairclimbing 5x   GAIT TRAINING          Level surface                                        MODALITIES         CP post tx prn  With heel prop x10 min                           "

## 2024-12-27 ENCOUNTER — APPOINTMENT (OUTPATIENT)
Dept: PHYSICAL THERAPY | Facility: CLINIC | Age: 64
End: 2024-12-27
Payer: MEDICARE

## 2024-12-31 ENCOUNTER — OFFICE VISIT (OUTPATIENT)
Dept: PHYSICAL THERAPY | Facility: CLINIC | Age: 64
End: 2024-12-31
Payer: MEDICARE

## 2024-12-31 DIAGNOSIS — Z96.652 TOTAL KNEE REPLACEMENT STATUS, LEFT: ICD-10-CM

## 2024-12-31 DIAGNOSIS — M17.12 PRIMARY OSTEOARTHRITIS OF LEFT KNEE: Primary | ICD-10-CM

## 2024-12-31 PROCEDURE — 97530 THERAPEUTIC ACTIVITIES: CPT

## 2024-12-31 PROCEDURE — 97140 MANUAL THERAPY 1/> REGIONS: CPT

## 2024-12-31 PROCEDURE — 97110 THERAPEUTIC EXERCISES: CPT

## 2024-12-31 NOTE — PROGRESS NOTES
"Daily Note     Today's date: 2024  Patient name: Robert Benson  : 1960  MRN: [id number]  Referring provider: Drake Vasquez,*  Dx:   Encounter Diagnosis     ICD-10-CM    1. Primary osteoarthritis of left knee  M17.12       2. Total knee replacement status, left  Z96.652                      Subjective: Pt reported minor L knee stiffness prior to PT. He has started to amb w/o his SPC while at home.       Objective: See treatment diary below      Assessment: Progressed resistance during multiple exercises to facilitate strength. Tolerated treatment well. Patient demonstrated fatigue post treatment, exhibited good technique with therapeutic exercises, and would benefit from continued PT      Plan: Continue per plan of care.  Progress treatment as tolerated.       Precautions: none  CO-MORBIDITIES: HTN, h/o R TKA, diabetes, COPD, heart murmur  HEP ACCESS CODE: n/a (pre-op handout)  FOTO Completed On: 24    POC Expires Reeval for Medicare to be completed Unit LImit Auth Expiration Date PT/OT/STVisit Limit   25 By visit  11 N/a N/a N/a    Completed on                  POC Expires Reeval for Medicare to be completed Unit LImit Auth Expiration Date PT/OT/STVisit Limit   25 By visit  11 N/a N/a N/a    Completed on                TREATMENT DIARY  Auth Status    NA 13 8 9 10 11 12    7 3 2 1 9 8   MANUAL THERAPY         L knee stretching  TM [facility] KB       RE,                                          THERAPEUTIC EXERCISE         Nustep for ROM and endurance  L5 x10 min L7 x10 min L7, 10' L8 x 10' L8 x 10'   Upright Bike L3 x10 min seat 8        SLR flex 2# 3x10 NV 2x10  2x10 2x10   Supine hip abduction Steamboats w/ RTB x20 ea 3 way standing   B/L x10 ea 3 way standing   B/L 2x10 ea  Abd/ext  standing   B/L 2x10 ea Abd/ext  standing   B/L 3x10 ea   LAQ 3# 5\" x20  (5# NV) 5\" x20   5\" x20  5\"x30 5\"x30 (weight NV)   HR/TR x30 x30 x30  " "X30 ea X30 ea   SAQ DC Small  Roll  2x10 5\" 5\" x20      Calf stretch Standing  SB  30\"x4 Standing  Fitter board  30\"x3 Standing  Fitter board  30\"x3  Standing  SB  30\"x4   Standing  SB  30\"x5   Piriformis stretch  With PT assist  30\"x3 With towel assist  30\"x3      Single knee to chest With towel assist  5\" x10 With towel assist  5\" x10 With towel assist  5\" x10  With towel assist  5\" x10 With towel assist  5\" x10   Leg press B/L 140# 2x10    LLE 80# 2x10                                   NEUROMUSCULAR REEDUCATION     12/20     Standing TKE with TB         PPT 5\" x20 5\" x20 5\" x20  5\"x20 5\"x20   Bridges 5\" x20  NV  5\" x20 5\" x20   Supine Iso hip abduction with TB DC Blue TB  5\" x20 Blue TB  5\" x20  Blue TB  5\" x20 Blue TB  5\" x20                                                                                             THERAPEUTIC ACTIVITY    12/20     Stair training         Mini squats DC to leg press  2x10  2x10 4x10   Step ups 8\" step fwd 3x10    8\" step lat 3x10 6\" step fwd  2x10    6\" step lat  x10 6\" step fwd  2x10    6\" step lat  2x10          Stairclimbing 3x Stairclimbing 5x Stairclimbing 5x   GAIT TRAINING         Level surface                                    MODALITIES         CP post tx prn  With heel prop x10 min                             "

## 2025-01-02 ENCOUNTER — OFFICE VISIT (OUTPATIENT)
Dept: PHYSICAL THERAPY | Facility: CLINIC | Age: 65
End: 2025-01-02
Payer: MEDICARE

## 2025-01-02 DIAGNOSIS — Z96.652 TOTAL KNEE REPLACEMENT STATUS, LEFT: ICD-10-CM

## 2025-01-02 DIAGNOSIS — M17.12 PRIMARY OSTEOARTHRITIS OF LEFT KNEE: Primary | ICD-10-CM

## 2025-01-02 PROCEDURE — 97530 THERAPEUTIC ACTIVITIES: CPT

## 2025-01-02 PROCEDURE — 97140 MANUAL THERAPY 1/> REGIONS: CPT

## 2025-01-02 PROCEDURE — 97110 THERAPEUTIC EXERCISES: CPT

## 2025-01-02 NOTE — PROGRESS NOTES
"Daily Note     Today's date: 2025  Patient name: Robert Benson  : 1960  MRN: [id number]  Referring provider: Drake Vasquez,*  Dx:   Encounter Diagnosis     ICD-10-CM    1. Primary osteoarthritis of left knee  M17.12       2. Total knee replacement status, left  Z96.652                      Subjective: Pt reported minor soreness after his LV. He continues to have minimal pain in the posterior aspect of the L knee prior to PT.       Objective: See treatment diary below      Assessment: Tolerated treatment well. Patient demonstrated fatigue post treatment, exhibited good technique with therapeutic exercises, and would benefit from continued PT      Plan: Continue per plan of care.  Progress treatment as tolerated.       Precautions: none  CO-MORBIDITIES: HTN, h/o R TKA, diabetes, COPD, heart murmur  HEP ACCESS CODE: n/a (pre-op handout)  FOTO Completed On: 24    POC Expires Reeval for Medicare to be completed Unit LImit Auth Expiration Date PT/OT/STVisit Limit   25 By visit  11 N/a N/a N/a    Completed on                  POC Expires Reeval for Medicare to be completed Unit LImit Auth Expiration Date PT/OT/STVisit Limit   25 By visit  11 N/a N/a N/a    Completed on                TREATMENT DIARY  Auth Status    NA 13 14  10 11 12    7 6  1 9 8   MANUAL THERAPY         L knee stretching  TM TM  LH AFB KB       RE, LH                                         THERAPEUTIC EXERCISE         Nustep for ROM and endurance    L7, 10' L8 x 10' L8 x 10'   Upright Bike L3 x10 min seat 8 L3 x10 min seat 8       SLR flex 2# 3x10 2# 3x10   2x10 2x10   Supine hip abduction Steamboats w/ RTB x20 ea Steamboats w/ RTB x20 ea   Abd/ext  standing   B/L 2x10 ea Abd/ext  standing   B/L 3x10 ea   LAQ 3# 5\" x20  (5# NV) 5# 5\" x20   5\"x30 5\"x30 (weight NV)   HR/TR x30 x30   X30 ea X30 ea   SAQ DC --       Calf stretch Standing  SB  30\"x4 Standing  Fitter board  30\"x4   " "Standing  SB  30\"x4   Standing  SB  30\"x5   Piriformis stretch         Single knee to chest With towel assist  5\" x10 With towel assist  5\" x10 ea   With towel assist  5\" x10 With towel assist  5\" x10   Leg press B/L 140# 2x10    LLE 80# 2x10 B/L 140# 2x10    LLE 80# 2x10                                  NEUROMUSCULAR REEDUCATION     12/20     Standing TKE with TB         PPT 5\" x20 5\" x20   5\"x20 5\"x20   Bridges 5\" x20 5\" x20    5\" x20 5\" x20   Supine Iso hip abduction with TB DC --   Blue TB  5\" x20 Blue TB  5\" x20                                                                                             THERAPEUTIC ACTIVITY    12/20     Stair training         Mini squats DC to leg press --   2x10 4x10   Step ups 8\" step fwd 3x10    8\" step lat 3x10 8\" step fwd  3x10    8\" step lat  3x10           Stairclimbing 3x Stairclimbing 5x Stairclimbing 5x   GAIT TRAINING         Level surface                                    MODALITIES         CP post tx prn                                 "

## 2025-01-06 ENCOUNTER — APPOINTMENT (OUTPATIENT)
Dept: PHYSICAL THERAPY | Facility: CLINIC | Age: 65
End: 2025-01-06
Payer: MEDICARE

## 2025-01-06 ENCOUNTER — PREP FOR PROCEDURE (OUTPATIENT)
Dept: GASTROENTEROLOGY | Facility: CLINIC | Age: 65
End: 2025-01-06

## 2025-01-06 ENCOUNTER — TELEPHONE (OUTPATIENT)
Age: 65
End: 2025-01-06

## 2025-01-06 DIAGNOSIS — K63.5 CECAL POLYP: Primary | ICD-10-CM

## 2025-01-06 RX ORDER — SODIUM CHLORIDE, SODIUM LACTATE, POTASSIUM CHLORIDE, CALCIUM CHLORIDE 600; 310; 30; 20 MG/100ML; MG/100ML; MG/100ML; MG/100ML
125 INJECTION, SOLUTION INTRAVENOUS CONTINUOUS
OUTPATIENT
Start: 2025-01-06

## 2025-01-06 NOTE — TELEPHONE ENCOUNTER
Patients GI provider:  Dr. Crooks    Number to return call: [phone]    Reason for call: Pt wife  calling to schedule a repeat EMR. Please reach back to the wife to schedule    Scheduled procedure/appointment date if applicable: Apt/procedure

## 2025-01-07 ENCOUNTER — OFFICE VISIT (OUTPATIENT)
Dept: OBGYN CLINIC | Facility: CLINIC | Age: 65
End: 2025-01-07

## 2025-01-07 VITALS — HEIGHT: 72 IN | BODY MASS INDEX: 32.64 KG/M2 | WEIGHT: 241 LBS

## 2025-01-07 DIAGNOSIS — K21.9 GASTROESOPHAGEAL REFLUX DISEASE, UNSPECIFIED WHETHER ESOPHAGITIS PRESENT: ICD-10-CM

## 2025-01-07 DIAGNOSIS — K22.0 ACHALASIA: ICD-10-CM

## 2025-01-07 DIAGNOSIS — Z96.652 S/P TOTAL KNEE REPLACEMENT, LEFT: Primary | ICD-10-CM

## 2025-01-07 PROCEDURE — 99024 POSTOP FOLLOW-UP VISIT: CPT | Performed by: ORTHOPAEDIC SURGERY

## 2025-01-07 NOTE — PROGRESS NOTES
Assessment/Plan:   Diagnoses and all orders for this visit:    S/P total knee replacement, left  Comments:  11/18/2024    Discussed with patient that he is progressing very well postoperatively. He demonstrates great range of motion, good strength, and good stability. He is to continue with formal physical therapy as per protocol. He can now discontinue aspirin 325 mg for DVT prophylaxis. Because he was using baby aspirin regularly prior to surgery, he can now go back to 81 mg aspirin daily. He will be seen in 6 weeks for 3-month reevaluation and repeat x-rays. Should any questions or concerns arise he can contact the office. Patient expresses understanding and is in agreement with this treatment plan.    The patient is doing quite well from his left total knee replacement.  Flexion past 120 degrees.  No instability.  Incision clean and dry.  Continue home exercise program.  Follow-up 6 weeks evaluation with new x-rays of left knee-3 views.  He may return back to taking baby aspirin daily    Subjective:   Patient ID: Robert Benson  1960     HPI  Patient is a 64 y.o. male who presents for 2nd postop evaluation 7 weeks s/p left TKA performed 11/18/2024. Patient is very satisfied and states he is progressing well. He has been consistent with formal physical therapy as per protocol. He has also been consistent with 325 mg aspirin for DVT prophylaxis for the last 4 weeks. He reports occasional soreness following physical therapy. He denies any new onset bruising, swelling, numbness, or tingling. He denies any feelings of instability. He also denies any recent fever, chills, headache, nausea, dizziness, or malaise.    The following portions of the patient's history were reviewed and updated as appropriate:  Past medical history, past surgical history, Family history, social history, current medications and allergies    Past Medical History:   Diagnosis Date    Bilateral flank pain 05/11/2023    CAD (coronary artery  disease)     nonobstructive RCA via CT 2013    CKD (chronic kidney disease)     Colon polyp     COPD (chronic obstructive pulmonary disease) (HCC)     CPAP (continuous positive airway pressure) dependence     Diabetes mellitus (HCC)     GERD (gastroesophageal reflux disease)     Heart murmur     Hyperlipidemia     Hypertension     PAF (paroxysmal atrial fibrillation) (HCC)     PONV (postoperative nausea and vomiting)     Sleep apnea     cpap    Wears hearing aid        Past Surgical History:   Procedure Laterality Date    APPENDECTOMY N/A     COLONOSCOPY      JOINT REPLACEMENT Right 2014    Knee    MYOTOMY HELLER LAPAROSCOPIC  2018    [facility]    [place] ARTHRP KNE CONDYLE&PLATU MEDIAL&LAT COMPARTMENTS Left 11/18/2024    Procedure: ARTHROPLASTY KNEE TOTAL;  Surgeon: Ehsan Rodriguez DO;  Location: [facility];  Service: Orthopedics    STOMACH SURGERY         Family History   Problem Relation Age of Onset    Cancer Mother         stomach    Heart attack Father     Cancer Sister         bladder    Cancer Brother         colon       Social History     Socioeconomic History    Marital status: /Civil Union     Spouse name: None    Number of children: None    Years of education: None    Highest education level: None   Occupational History    None   Tobacco Use    Smoking status: Some Days     Types: Cigars     Passive exposure: Past    Smokeless tobacco: Never    Tobacco comments:     Cigars 5/weeks   Vaping Use    Vaping status: Never Used   Substance and Sexual Activity    Alcohol use: Not Currently     Comment: rarely    Drug use: Never    Sexual activity: Not Currently   Other Topics Concern    None   Social History Narrative    None     Social Drivers of Health     Financial Resource Strain: Not on file   Food Insecurity: No Food Insecurity (11/18/2024)    Nursing - Inadequate Food Risk Classification     Worried About Running Out of Food in the Last Year: Never true     Ran Out of Food in the Last Year: Never true      Ran Out of Food in the Last Year: Never true   Transportation Needs: No Transportation Needs (2024)    Nursing - Transportation Risk Classification     Lack of Transportation: Not on file     Lack of Transportation: No   Physical Activity: Not on file   Stress: Not on file   Social Connections: Not on file   Intimate Partner Violence: Unknown (2024)    Nursing IPS     Feels Physically and Emotionally Safe: Not on file     Physically Hurt by Someone: Not on file     Humiliated or Emotionally Abused by Someone: Not on file     Physically Hurt by Someone: No     Hurt or Threatened by Someone: No   Housing Stability: Unknown (2024)    Nursing: Inadequate Housing Risk Classification     Has Housing: Not on file     Worried About Losing Housing: Not on file     Unable to Get Utilities: Not on file     Unable to Pay for Housing in the Last Year: No     Has Housin         Current Outpatient Medications:     acetaminophen (TYLENOL) 325 mg tablet, Take 2 tablets (650 mg total) by mouth every 4 (four) hours as needed for mild pain (fever > 100.5), Disp: , Rfl:     atorvastatin (LIPITOR) 40 mg tablet, Take 1 tablet (40 mg total) by mouth every evening, Disp: 90 tablet, Rfl: 5    esomeprazole (NexIUM) 40 MG capsule, Take 1 capsule (40 mg total) by mouth in the morning, Disp: 90 capsule, Rfl: 3    fenofibrate (TRICOR) 145 mg tablet, Take 1 tablet (145 mg total) by mouth daily, Disp: 100 tablet, Rfl: 1    ferrous sulfate 324 (65 Fe) mg, TAKE 1 TABLET BY MOUTH 2 TIMES A DAY BEFORE MEALS DO NOT START BEFORE 2024., Disp: 60 tablet, Rfl: 5    Jardiance 10 MG TABS tablet, TAKE 1 TABLET BY MOUTH EVERY DAY, Disp: 90 tablet, Rfl: 1    lisinopril (ZESTRIL) 5 mg tablet, Take 1 tablet (5 mg total) by mouth daily, Disp: 90 tablet, Rfl: 5    metFORMIN (GLUCOPHAGE) 500 mg tablet, Take 1 tablet (500 mg total) by mouth daily with breakfast, Disp: 90 tablet, Rfl: 3    nicotine polacrilex (Nicotine Mini) 2 MG  lozenge, Apply 1 lozenge (2 mg total) to the mouth or throat as needed for smoking cessation, Disp: 81 lozenge, Rfl: 3    sodium chloride 1 g tablet, TAKE 1 TABLET (1 G TOTAL) BY MOUTH EVERY OTHER DAY, Disp: 45 tablet, Rfl: 1    amoxicillin (AMOXIL) 500 mg capsule, Take 2,000 mg by mouth as needed 1 hour prior to dental appointment (Patient not taking: Reported on 10/15/2024), Disp: , Rfl:     ascorbic acid (VITAMIN C) 500 MG tablet, Take 1 tablet (500 mg total) by mouth 2 (two) times a day Do not start before October 17, 2024., Disp: 60 tablet, Rfl: 1    docusate sodium (COLACE) 100 mg capsule, Take 1 capsule (100 mg total) by mouth 2 (two) times a day (Patient not taking: Reported on 1/7/2025), Disp: 60 capsule, Rfl: 0    folic acid (FOLVITE) 1 mg tablet, Take 1 tablet (1 mg total) by mouth daily Do not start before October 17, 2024., Disp: 30 tablet, Rfl: 1    fondaparinux (ARIXTRA) 2.5 mg/0.5 mL, Inject 2.5 mg under the skin every 24 hours for 14 days, Disp: 7 mL, Rfl: 0    Multiple Vitamins-Minerals (multivitamin with minerals) tablet, Take 1 tablet by mouth daily Do not start before October 17, 2024., Disp: 30 tablet, Rfl: 1    No Known Allergies    Review of Systems   Constitutional:  Negative for chills, fatigue and fever.   Gastrointestinal:  Negative for nausea.   Musculoskeletal:         As noted in HPI   Neurological:  Negative for dizziness, numbness and headaches.   All other systems reviewed and are negative.       Objective:  Ht 6' (1.829 m)   Wt 109 kg (241 lb)   BMI 32.69 kg/m²     Ortho Exam  Left knee -   Weight-bearing status: Full WBAT  Incision(s):  Clean, dry, healing- No signs of drainage or dehiscence.   Mild soft tissue swelling as expected post-operatively.   ROM: 3-110  Strength: extensor and flexor mechanism intact   Knee is stable to varus and valgus stress  Knee is stable to anterior and posterior stress   Calf compartments are soft and supple  - Marky's sign  2+ DP and PT pulses  with brisk capillary refill to the toes  Sural, saphenous, tibial, superficial, and deep peroneal motor and sensory distributions intact  Sensation light touch intact distally      Physical Exam  Vitals reviewed.   Constitutional:       Appearance: He is well-developed.   HENT:      Head: Normocephalic and atraumatic.      Nose: Nose normal.   Eyes:      Conjunctiva/sclera: Conjunctivae normal.   Cardiovascular:      Rate and Rhythm: Normal rate.   Pulmonary:      Effort: Pulmonary effort is normal.   Musculoskeletal:      Cervical back: Neck supple.   Skin:     General: Skin is warm and dry.      Capillary Refill: Capillary refill takes less than 2 seconds.   Neurological:      Mental Status: He is alert and oriented to person, place, and time.   Psychiatric:         Mood and Affect: Mood normal.         Behavior: Behavior normal.          Diagnostic Test Review:  No new imaging reviewed this visit    Procedures   No procedures performed this visit    Scribe Attestation      I,:  Pillo Lloyd am acting as a scribe while in the presence of the attending physician.:       I,:  Ehsan Rodriguez, DO personally performed the services described in this documentation    as scribed in my presence.:

## 2025-01-07 NOTE — TELEPHONE ENCOUNTER
Scheduled date of colonoscopy emr (as of today): 3/4/25  Physician performing colonoscopy: Dr. Crooks   Location of colonoscopy: [place]   Bowel prep reviewed with patient: miralax/dulcolax   Instructions reviewed with patient by: Belkis quinones   Clearances:  Diabetic - med form sent

## 2025-01-08 RX ORDER — ESOMEPRAZOLE MAGNESIUM 40 MG/1
40 CAPSULE, DELAYED RELEASE ORAL DAILY
Qty: 90 CAPSULE | Refills: 1 | Status: SHIPPED | OUTPATIENT
Start: 2025-01-08

## 2025-01-09 ENCOUNTER — OFFICE VISIT (OUTPATIENT)
Dept: PHYSICAL THERAPY | Facility: CLINIC | Age: 65
End: 2025-01-09
Payer: MEDICARE

## 2025-01-09 DIAGNOSIS — Z96.652 TOTAL KNEE REPLACEMENT STATUS, LEFT: ICD-10-CM

## 2025-01-09 DIAGNOSIS — M17.12 PRIMARY OSTEOARTHRITIS OF LEFT KNEE: Primary | ICD-10-CM

## 2025-01-09 PROCEDURE — 97110 THERAPEUTIC EXERCISES: CPT

## 2025-01-09 PROCEDURE — 97140 MANUAL THERAPY 1/> REGIONS: CPT

## 2025-01-09 PROCEDURE — 97530 THERAPEUTIC ACTIVITIES: CPT

## 2025-01-09 NOTE — PROGRESS NOTES
"Daily Note     Today's date: 2025  Patient name: Robert Benson  : 1960  MRN: [id number]  Referring provider: Drake Vasquez,*  Dx:   Encounter Diagnosis     ICD-10-CM    1. Primary osteoarthritis of left knee  M17.12       2. Total knee replacement status, left  Z96.652                      Subjective: Pt denied pain at rest. He has not been using his SPC for household amb. He is pleased with his progress this far.       Objective: See treatment diary below      Assessment: Increased resistance and initiated a weighted knee ext machine to facilitate strength. Tolerated treatment well. Patient demonstrated fatigue post treatment, exhibited good technique with therapeutic exercises, and would benefit from continued PT      Plan: Continue per plan of care.  Progress treatment as tolerated.       Precautions: none  CO-MORBIDITIES: HTN, h/o R TKA, diabetes, COPD, heart murmur  HEP ACCESS CODE: n/a (pre-op handout)  FOTO Completed On: 24    POC Expires Reeval for Medicare to be completed Unit LImit Auth Expiration Date PT/OT/STVisit Limit   25 By visit  11 N/a N/a N/a    Completed on                  POC Expires Reeval for Medicare to be completed Unit LImit Auth Expiration Date PT/OT/STVisit Limit   25 By visit  11 N/a N/a N/a    Completed on                TREATMENT DIARY  Auth Status    NA 13 14 15  11 12    7 6 5  9 8   MANUAL THERAPY         L knee stretching  TM TM TM  AFB KB                                                THERAPEUTIC EXERCISE         Nustep for ROM and endurance     L8 x 10' L8 x 10'   Upright Bike L3 x10 min seat 8 L3 x10 min seat 8 L5 x10 min seat 8      SLR flex 2# 3x10 2# 3x10 2# 3x10  2x10 2x10   Supine hip abduction Steamboats w/ RTB x20 ea Steamboats w/ RTB x20 ea Steamboats w/ RTB x20 ea  Abd/ext  standing   B/L 2x10 ea Abd/ext  standing   B/L 3x10 ea   LAQ 3# 5\" x20  (5# NV) 5# 5\" x20 DC to machine  5\"x30 5\"x30 (weight " "NV)   HR/TR x30 x30 x30  X30 ea X30 ea   SAQ DC --       Calf stretch Standing  SB  30\"x4 Standing  Fitter board  30\"x4 Standing  Fitter board  30\"x4  Standing  SB  30\"x4   Standing  SB  30\"x5   Piriformis stretch         Single knee to chest With towel assist  5\" x10 With towel assist  5\" x10 ea With towel assist  10\" x10 ea  With towel assist  5\" x10 With towel assist  5\" x10   Leg press B/L 140# 2x10    LLE 80# 2x10 B/L 140# 2x10    LLE 80# 2x10 B/L 150# 2x10    LLE 90# 2x10      Knee Ext machine   40# 3x10                        NEUROMUSCULAR REEDUCATION          Standing TKE with TB         PPT 5\" x20 5\" x20 5\" x20  5\"x20 5\"x20   Bridges 5\" x20 5\" x20  5\" x20  5\" x20 5\" x20   Supine Iso hip abduction with TB DC --   Blue TB  5\" x20 Blue TB  5\" x20                                                                                             THERAPEUTIC ACTIVITY         Stair training         Mini squats DC to leg press --   2x10 4x10   Step ups 8\" step fwd 3x10    8\" step lat 3x10 8\" step fwd  3x10    8\" step lat  3x10 8\" step fwd  3x10    8\" step lat  3x10           Stairclimbing 5x Stairclimbing 5x   GAIT TRAINING         Level surface                                    MODALITIES         CP post tx prn                                   "

## 2025-01-13 ENCOUNTER — APPOINTMENT (OUTPATIENT)
Age: 65
End: 2025-01-13
Payer: MEDICARE

## 2025-01-13 DIAGNOSIS — Z11.59 NEED FOR HEPATITIS C SCREENING TEST: ICD-10-CM

## 2025-01-13 DIAGNOSIS — E08.40 DIABETES MELLITUS DUE TO UNDERLYING CONDITION WITH DIABETIC NEUROPATHY, WITHOUT LONG-TERM CURRENT USE OF INSULIN (HCC): ICD-10-CM

## 2025-01-13 LAB
ANION GAP SERPL CALCULATED.3IONS-SCNC: 9 MMOL/L (ref 4–13)
BUN SERPL-MCNC: 15 MG/DL (ref 5–25)
CALCIUM SERPL-MCNC: 9.5 MG/DL (ref 8.4–10.2)
CHLORIDE SERPL-SCNC: 97 MMOL/L (ref 96–108)
CHOLEST SERPL-MCNC: 165 MG/DL (ref ?–200)
CO2 SERPL-SCNC: 26 MMOL/L (ref 21–32)
CREAT SERPL-MCNC: 1.24 MG/DL (ref 0.6–1.3)
EST. AVERAGE GLUCOSE BLD GHB EST-MCNC: 137 MG/DL
GFR SERPL CREATININE-BSD FRML MDRD: 60 ML/MIN/1.73SQ M
GLUCOSE P FAST SERPL-MCNC: 111 MG/DL (ref 65–99)
HBA1C MFR BLD: 6.4 %
HDLC SERPL-MCNC: 32 MG/DL
LDLC SERPL CALC-MCNC: 100 MG/DL (ref 0–100)
NONHDLC SERPL-MCNC: 133 MG/DL
POTASSIUM SERPL-SCNC: 4.6 MMOL/L (ref 3.5–5.3)
SODIUM SERPL-SCNC: 132 MMOL/L (ref 135–147)
TRIGL SERPL-MCNC: 165 MG/DL (ref ?–150)

## 2025-01-13 PROCEDURE — 80048 BASIC METABOLIC PNL TOTAL CA: CPT

## 2025-01-13 PROCEDURE — 86803 HEPATITIS C AB TEST: CPT

## 2025-01-13 PROCEDURE — 80061 LIPID PANEL: CPT

## 2025-01-13 PROCEDURE — 36415 COLL VENOUS BLD VENIPUNCTURE: CPT

## 2025-01-13 PROCEDURE — 83036 HEMOGLOBIN GLYCOSYLATED A1C: CPT

## 2025-01-14 ENCOUNTER — OFFICE VISIT (OUTPATIENT)
Dept: PHYSICAL THERAPY | Facility: CLINIC | Age: 65
End: 2025-01-14
Payer: MEDICARE

## 2025-01-14 DIAGNOSIS — Z96.652 TOTAL KNEE REPLACEMENT STATUS, LEFT: ICD-10-CM

## 2025-01-14 DIAGNOSIS — M17.12 PRIMARY OSTEOARTHRITIS OF LEFT KNEE: Primary | ICD-10-CM

## 2025-01-14 LAB — HCV AB SER QL: NORMAL

## 2025-01-14 PROCEDURE — 97110 THERAPEUTIC EXERCISES: CPT | Performed by: PHYSICAL THERAPIST

## 2025-01-14 PROCEDURE — 97140 MANUAL THERAPY 1/> REGIONS: CPT | Performed by: PHYSICAL THERAPIST

## 2025-01-14 NOTE — PROGRESS NOTES
Daily Note     Today's date: 2025  Patient name: Robert Benson  : 1960  MRN: [id number]  Referring provider: rDake Vasquez,*  Dx:   Encounter Diagnosis     ICD-10-CM    1. Primary osteoarthritis of left knee  M17.12       2. Total knee replacement status, left  Z96.652                      Subjective: Patient reports he is feeling pretty good. He is not 100% but is getting there. His next f/u with Dr. Rodriguez is on . He is going to reach out to Dr. 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       Objective: See treatment diary below      Assessment: Tolerated treatment well. Continues with slight limitation in L knee ROM at end range flexion and extension.  Patient demonstrated fatigue post treatment, exhibited good technique with therapeutic exercises, and would benefit from continued PT      Plan: Continue per plan of care.  Progress treatment as tolerated.       Precautions: none  CO-MORBIDITIES: HTN, h/o R TKA, diabetes, COPD, heart murmur  HEP ACCESS CODE: n/a (pre-op handout)  FOTO Completed On: 24    POC Expires Reeval for Medicare to be completed Unit LImit Auth Expiration Date PT/OT/ST  Visit Limit   25 By visit  20 N/a N/a N/a    Completed on                TREATMENT DIARY  Auth Status DATE    NA Visit # 13 14 15 16 11 12    Remaining 7 6 5 4 9 8   MANUAL THERAPY          L knee stretching   TM TM TM KG AFB KB                                                     THERAPEUTIC EXERCISE          Nustep for ROM and endurance      L8 x 10' L8 x 10'   Upright Bike  L3 x10 min seat 8 L3 x10 min seat 8 L5 x10 min seat 8 L7 X10 mins  Seat 8     SLR flex  2# 3x10 2# 3x10 2# 3x10 2# 1x10  3# 2x10 2x10 2x10   Supine hip abduction  Steamboats w/ RTB x20 ea Steamboats w/ RTB x20 ea Steamboats w/ RTB x20 ea Steamboats w/ RTB x20 ea Abd/ext  standing   B/L 2x10 ea Abd/ext  standing   B/L 3x10 ea   LAQ  3#  "5\" x20  (5# NV) 5# 5\" x20 DC to machine -- 5\"x30 5\"x30 (weight NV)   HR/TR  x30 x30 x30  X30 ea X30 ea   Calf stretch  Standing  SB  30\"x4 Standing  Fitter board  30\"x4 Standing  Fitter board  30\"x4 Fitter board  30\"x3 Standing  SB  30\"x4   Standing  SB  30\"x5   HS stretch     Standing, heel prop  30\"x3     Single knee to chest  With towel assist  5\" x10 With towel assist  5\" x10 ea With towel assist  10\" x10 ea  With towel assist  5\" x10 With towel assist  5\" x10   Leg press  B/L 140# 2x10    LLE 80# 2x10 B/L 140# 2x10    LLE 80# 2x10 B/L 150# 2x10    LLE 90# 2x10 B/L 150# 3x10    LLE 90# 3x10     Knee Ext machine    40# 3x10 40# 1x15  50# 1x15                         NEUROMUSCULAR REEDUCATION           Standing TKE with TB          PPT  5\" x20 5\" x20 5\" x20 5\" x20 5\"x20 5\"x20   Bridges  5\" x20 5\" x20  5\" x20 5\" x20 5\" x20 5\" x20   Supine Iso hip abduction with TB  DC --  -- Blue TB  5\" x20 Blue TB  5\" x20                                                                                                       THERAPEUTIC ACTIVITY          Stair training          Mini squats  DC to leg press --  -- 2x10 4x10   Step ups  8\" step fwd 3x10    8\" step lat 3x10 8\" step fwd  3x10    8\" step lat  3x10 8\" step fwd  3x10    8\" step lat  3x10 8\" step fwd  3x10    8\" step lat  3x10           Stairclimbing 5x Stairclimbing 5x   GAIT TRAINING          Level surface                                        MODALITIES          CP post tx prn                                       "

## 2025-01-17 ENCOUNTER — RESULTS FOLLOW-UP (OUTPATIENT)
Dept: FAMILY MEDICINE CLINIC | Facility: CLINIC | Age: 65
End: 2025-01-17

## 2025-01-17 ENCOUNTER — OFFICE VISIT (OUTPATIENT)
Dept: PHYSICAL THERAPY | Facility: CLINIC | Age: 65
End: 2025-01-17
Payer: MEDICARE

## 2025-01-17 DIAGNOSIS — M17.12 PRIMARY OSTEOARTHRITIS OF LEFT KNEE: Primary | ICD-10-CM

## 2025-01-17 DIAGNOSIS — Z96.652 TOTAL KNEE REPLACEMENT STATUS, LEFT: ICD-10-CM

## 2025-01-17 PROCEDURE — 97112 NEUROMUSCULAR REEDUCATION: CPT | Performed by: PHYSICAL THERAPIST

## 2025-01-17 PROCEDURE — 97110 THERAPEUTIC EXERCISES: CPT | Performed by: PHYSICAL THERAPIST

## 2025-01-17 PROCEDURE — 97140 MANUAL THERAPY 1/> REGIONS: CPT | Performed by: PHYSICAL THERAPIST

## 2025-01-17 NOTE — PROGRESS NOTES
"Daily Note     Today's date: 2025  Patient name: Robert Benson  : 1960  MRN: [id number]  Referring provider: Drake Vasquez,*  Dx:   Encounter Diagnosis     ICD-10-CM    1. Primary osteoarthritis of left knee  M17.12       2. Total knee replacement status, left  Z96.652                      Subjective: Patient reports he did a lot of driving recently and notices increased pain within the first hour of driving.       Objective: See treatment diary below      Assessment: Progressed exercises this session as charted to enhance functional strength and balance. Pt was challenged by progressions,but did not report any increase in pain/discomfort. Tolerated treatment well. Patient demonstrated fatigue post treatment, exhibited good technique with therapeutic exercises, and would benefit from continued PT      Plan: Progress treatment as tolerated.       Precautions: none  CO-MORBIDITIES: HTN, h/o R TKA, diabetes, COPD, heart murmur  HEP ACCESS CODE: n/a (pre-op handout)  FOTO Completed On: 24    POC Expires Reeval for Medicare to be completed Unit LImit Auth Expiration Date PT/OT/ST  Visit Limit   25 By visit  20 N/a N/a N/a    Completed on                TREATMENT DIARY  Auth Status DATE -   NA Visit # 13 14 15 16 17 12    Remaining 7 6 5 4 3 8   MANUAL THERAPY          L knee stretching   TM TM TM KG JG KB                                                     THERAPEUTIC EXERCISE          Nustep for ROM and endurance       L8 x 10'   Upright Bike  L3 x10 min seat 8 L3 x10 min seat 8 L5 x10 min seat 8 L7 X10 mins  Seat 8 L7 X10 mins  Seat 8    SLR flex  2# 3x10 2# 3x10 2# 3x10 2# 1x10  3# 2x10 3 x 10 ,3# 2x10   Supine hip abduction  Steamboats w/ RTB x20 ea Steamboats w/ RTB x20 ea Steamboats w/ RTB x20 ea Steamboats w/ RTB x20 ea Steamboats w/ RTB x20 ea Abd/ext  standing   B/L 3x10 ea   LAQ  3# 5\" x20  (5# NV) 5# 5\" x20 DC to machine --  5\"x30 (weight NV) " "  HR/TR  x30 x30 x30   X30 ea   Calf stretch  Standing  SB  30\"x4 Standing  Fitter board  30\"x4 Standing  Fitter board  30\"x4 Fitter board  30\"x3 Fitter board  30\"x3 Standing  SB  30\"x5   HS stretch     Standing, heel prop  30\"x3 Standing, heel prop  30\"x3    Single knee to chest  With towel assist  5\" x10 With towel assist  5\" x10 ea With towel assist  10\" x10 ea   With towel assist  5\" x10   Leg press  B/L 140# 2x10    LLE 80# 2x10 B/L 140# 2x10    LLE 80# 2x10 B/L 150# 2x10    LLE 90# 2x10 B/L 150# 3x10    LLE 90# 3x10 B/L 150# 3x10    LLE 90# 3x10    Knee Ext machine    40# 3x10 40# 1x15  50# 1x15 2 x 15, 50#                        NEUROMUSCULAR REEDUCATION           Standing TKE with TB          PPT  5\" x20 5\" x20 5\" x20 5\" x20 20 x 5\" 5\"x20   Bridges  5\" x20 5\" x20  5\" x20 5\" x20 20 x 5\" 5\" x20   Supine Iso hip abduction with TB  DC --  --  Blue TB  5\" x20                                                                                                       THERAPEUTIC ACTIVITY          Stair training          Mini squats  DC to leg press --  --  4x10   Step ups  8\" step fwd 3x10    8\" step lat 3x10 8\" step fwd  3x10    8\" step lat  3x10 8\" step fwd  3x10    8\" step lat  3x10 8\" step fwd  3x10    8\" step lat  3x10 8\" step fwd  3x10    8\" step lat  3x10           Stairclimbing 5x   GAIT TRAINING          Level surface                                        MODALITIES          CP post tx prn                        * 38 minutes 1:1 time this date.  "

## 2025-01-21 ENCOUNTER — OFFICE VISIT (OUTPATIENT)
Dept: PHYSICAL THERAPY | Facility: CLINIC | Age: 65
End: 2025-01-21
Payer: MEDICARE

## 2025-01-21 DIAGNOSIS — Z96.652 TOTAL KNEE REPLACEMENT STATUS, LEFT: ICD-10-CM

## 2025-01-21 DIAGNOSIS — M17.12 PRIMARY OSTEOARTHRITIS OF LEFT KNEE: Primary | ICD-10-CM

## 2025-01-21 PROCEDURE — 97110 THERAPEUTIC EXERCISES: CPT

## 2025-01-21 PROCEDURE — 97140 MANUAL THERAPY 1/> REGIONS: CPT

## 2025-01-21 NOTE — PROGRESS NOTES
"Daily Note     Today's date: 2025  Patient name: Robert Benson  : 1960  MRN: [id number]  Referring provider: Drake Vasquez,*  Dx:   Encounter Diagnosis     ICD-10-CM    1. Primary osteoarthritis of left knee  M17.12       2. Total knee replacement status, left  Z96.652                      Subjective: Pt denied pain prior to PT. He feels his strength/endurance of the LLE is increasing, but feels it needs more exercise.       Objective: See treatment diary below      Assessment: Increased resistance surfing multiple weight machine exercises to facilitate LE strength. Tolerated treatment well. He continues to be slightly limited in TKE during manuals. Patient demonstrated fatigue post treatment, exhibited good technique with therapeutic exercises, and would benefit from continued PT      Plan: Continue per plan of care.  Progress treatment as tolerated.       Precautions: none  CO-MORBIDITIES: HTN, h/o R TKA, diabetes, COPD, heart murmur  HEP ACCESS CODE: n/a (pre-op handout)  FOTO Completed On: 24    POC Expires Reeval for Medicare to be completed Unit LImit Auth Expiration Date PT/OT/ST  Visit Limit   25 By visit  20 N/a N/a N/a    Completed on                TREATMENT DIARY  Auth Status DATE  1-   NA Visit # 13 14 15 16 17 18    Remaining 7 6 5 4 3 2   MANUAL THERAPY          L knee stretching   TM TM TM KG JG TM                                                     THERAPEUTIC EXERCISE          Nustep for ROM and endurance          Upright Bike  L3 x10 min seat 8 L3 x10 min seat 8 L5 x10 min seat 8 L7 X10 mins  Seat 8 L7 X10 mins  Seat 8 L7 X10 mins  Seat 8   SLR flex  2# 3x10 2# 3x10 2# 3x10 2# 1x10  3# 2x10 3 x 10 ,3# 3# 3x10   Supine hip abduction  Steamboats w/ RTB x20 ea Steamboats w/ RTB x20 ea Steamboats w/ RTB x20 ea Steamboats w/ RTB x20 ea Steamboats w/ RTB x20 ea Steamboats w/ RTB x20 ea   LAQ  3# 5\" x20  (5# NV) 5# 5\" x20 DC to machine --   " "  HR/TR  x30 x30 x30      Calf stretch  Standing  SB  30\"x4 Standing  Fitter board  30\"x4 Standing  Fitter board  30\"x4 Fitter board  30\"x3 Fitter board  30\"x3 Fitter board  30\"x3   HS stretch     Standing, heel prop  30\"x3 Standing, heel prop  30\"x3 Standing, heel prop  30\"x3   Single knee to chest  With towel assist  5\" x10 With towel assist  5\" x10 ea With towel assist  10\" x10 ea      Leg press  B/L 140# 2x10    LLE 80# 2x10 B/L 140# 2x10    LLE 80# 2x10 B/L 150# 2x10    LLE 90# 2x10 B/L 150# 3x10    LLE 90# 3x10 B/L 150# 3x10    LLE 90# 3x10 B/L 170# 2x15    # 2x15   Knee Ext machine    40# 3x10 40# 1x15  50# 1x15 2 x 15, 50# 1x15 50#   1x15 #60    2x15 #60 NV                       NEUROMUSCULAR REEDUCATION           Standing TKE with TB          PPT  5\" x20 5\" x20 5\" x20 5\" x20 20 x 5\" 5\"x20   Bridges  5\" x20 5\" x20  5\" x20 5\" x20 20 x 5\" 5\" x20   Supine Iso hip abduction with TB  DC --  --                                                                                                         THERAPEUTIC ACTIVITY          Stair training          Mini squats  DC to leg press --  --     Step ups  8\" step fwd 3x10    8\" step lat 3x10 8\" step fwd  3x10    8\" step lat  3x10 8\" step fwd  3x10    8\" step lat  3x10 8\" step fwd  3x10    8\" step lat  3x10 8\" step fwd  3x10    8\" step lat  3x10 8\" step fwd  3x10    8\" step lat  3x10             GAIT TRAINING          Level surface                                        MODALITIES          CP post tx prn                           "

## 2025-01-24 ENCOUNTER — OFFICE VISIT (OUTPATIENT)
Dept: PHYSICAL THERAPY | Facility: CLINIC | Age: 65
End: 2025-01-24
Payer: MEDICARE

## 2025-01-24 DIAGNOSIS — M17.12 PRIMARY OSTEOARTHRITIS OF LEFT KNEE: Primary | ICD-10-CM

## 2025-01-24 DIAGNOSIS — Z96.652 TOTAL KNEE REPLACEMENT STATUS, LEFT: ICD-10-CM

## 2025-01-24 PROCEDURE — 97110 THERAPEUTIC EXERCISES: CPT | Performed by: PHYSICAL THERAPIST

## 2025-01-24 PROCEDURE — 97140 MANUAL THERAPY 1/> REGIONS: CPT | Performed by: PHYSICAL THERAPIST

## 2025-01-24 NOTE — PROGRESS NOTES
"Daily Note     Today's date: 2025  Patient name: Robert Benson  : 1960  MRN: [id number]  Referring provider: Drake Vasquez,*  Dx:   Encounter Diagnosis     ICD-10-CM    1. Primary osteoarthritis of left knee  M17.12       2. Total knee replacement status, left  Z96.652                      Subjective: patient reports he slept funny 2 days ago and his knee is a little stiff from that, but overall is doing well.      Objective: See treatment diary below      Assessment: Tolerated treatment well. Patient notes the pain / stiffness he had prior to coming in was gone after manual therapy. Continued with program as noted below. No reports of increased symptoms noted throughout the session.  Patient demonstrated fatigue post treatment, exhibited good technique with therapeutic exercises, and would benefit from continued PT      Plan: Continue per plan of care.      Precautions: none  CO-MORBIDITIES: HTN, h/o R TKA, diabetes, COPD, heart murmur  HEP ACCESS CODE: n/a (pre-op handout)  FOTO Completed On: 24    POC Expires Reeval for Medicare to be completed Unit LImit Auth Expiration Date PT/OT/ST  Visit Limit   25 By visit  20 N/a N/a N/a    Completed on                TREATMENT DIARY  Auth Status DATE  1-     NA Visit # 15 16 17 18 19      Remaining 5 4 3 2 1     MANUAL THERAPY           L knee stretching   TM KG JG TM SK                                                            THERAPEUTIC EXERCISE           Nustep for ROM and endurance           Upright Bike  L5 x10 min seat 8 L7 X10 mins  Seat 8 L7 X10 mins  Seat 8 L7 X10 mins  Seat 8 L7 X10 mins  Seat 8     SLR flex  2# 3x10 2# 1x10  3# 2x10 3 x 10 ,3# 3# 3x10 3# 3x10     Supine hip abduction  Steamboats w/ RTB x20 ea Steamboats w/ RTB x20 ea Steamboats w/ RTB x20 ea Steamboats w/ RTB x20 ea Steamboats w/ RTB x20 ea     LAQ  DC to machine --        HR/TR  x30         Calf stretch  Standing  Fitter board  30\"x4 " "Fitter board  30\"x3 Fitter board  30\"x3 Fitter board  30\"x3 Fitter board  30\"x3     HS stretch   Standing, heel prop  30\"x3 Standing, heel prop  30\"x3 Standing, heel prop  30\"x3 Standing, heel prop  30\"x3     Single knee to chest  With towel assist  10\" x10 ea         Leg press  B/L 150# 2x10    LLE 90# 2x10 B/L 150# 3x10    LLE 90# 3x10 B/L 150# 3x10    LLE 90# 3x10 B/L 170# 2x15    # 2x15 B/L 170# 2x15    # 2x15     Knee Ext machine  40# 3x10 40# 1x15  50# 1x15 2 x 15, 50# 1x15 50#   1x15 #60    2x15 #60 NV 2x15 #60                            NEUROMUSCULAR REEDUCATION            Standing TKE with TB           PPT  5\" x20 5\" x20 20 x 5\" 5\"x20 5\"x20     Bridges  5\" x20 5\" x20 20 x 5\" 5\" x20 5\"x20     Supine Iso hip abduction with TB   --                                                                                                                      THERAPEUTIC ACTIVITY           Stair training           Mini squats   --        Step ups  8\" step fwd  3x10    8\" step lat  3x10 8\" step fwd  3x10    8\" step lat  3x10 8\" step fwd  3x10    8\" step lat  3x10 8\" step fwd  3x10    8\" step lat  3x10 8\" step fwd  3x10    8\" step lat  3x10                GAIT TRAINING           Level surface                                            MODALITIES           CP post tx prn                               "

## 2025-01-27 ENCOUNTER — EVALUATION (OUTPATIENT)
Dept: PHYSICAL THERAPY | Facility: CLINIC | Age: 65
End: 2025-01-27
Payer: MEDICARE

## 2025-01-27 DIAGNOSIS — M17.12 PRIMARY OSTEOARTHRITIS OF LEFT KNEE: Primary | ICD-10-CM

## 2025-01-27 DIAGNOSIS — Z96.652 TOTAL KNEE REPLACEMENT STATUS, LEFT: ICD-10-CM

## 2025-01-27 PROCEDURE — 97110 THERAPEUTIC EXERCISES: CPT | Performed by: PHYSICAL THERAPIST

## 2025-01-27 PROCEDURE — 97140 MANUAL THERAPY 1/> REGIONS: CPT | Performed by: PHYSICAL THERAPIST

## 2025-01-27 NOTE — PROGRESS NOTES
PT Re-Evaluation     Today's date: 2025  Patient name: Robert Benson  : 1960  MRN: [id number]  Referring provider: Drake Vasquez,*  Dx:   Encounter Diagnosis     ICD-10-CM    1. Primary osteoarthritis of left knee  M17.12       2. Total knee replacement status, left  Z96.652                      Assessment  Impairments: participation limitations and activity limitations  Functional limitations: limited ability to transfer to/from floor    Assessment details: Robert Benson has attended a total of 20 visits of OPPT, 1 of these visits was pre-op. He is now >2 months post-op and is doing well. He has demonstrated decreased pain, increased strength, increased range of motion, and increased activity tolerance since starting physical therapy services. He reports an overall improvement of 80% thus far. He has not been able to walk outside much due to the weather, but does get out to local larger stores to walk indoors and has been able to enjoy going out to hockey games. 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 He has otherwise met his goals and is very pleased with his progress. Will finish PT end of this week, plan to DC to HEP.   Understanding of Dx/Px/POC: good     Prognosis: good    Goals  STGs  1) In 1 visit patient will be independent with HEP    Updated goals 24:  STGs  1) In 4 weeks patient will demonstrate knee ROM 0-120 deg- MET  2) In 4 weeks patient will demonstrate good quad actviation, able to complete supine SLR flexion without quad lag- MET  3) In 4 weeks patient will demonstrate improved safety with gait, able to navigate household distances with SPC- MET    LTGs  1) In 6-12 weeks patient will have no difficulty with stair navigation - MET  2) In 6-12 weeks patient will have no difficulty with ambulation on all surfaces - MET  3) In 6-12 weeks patient will report little to no  difficulty with all ADLs and IADLs- MET  4) IN 12+ weeks patient will report little to no difficulty with all recreational/leisure activities -MET    Plan  Patient would benefit from: skilled physical therapy  Referral necessary: No  Planned modality interventions: TENS, thermotherapy: hydrocollator packs and cryotherapy    Planned therapy interventions: IASTM, joint mobilization, kinesiology taping, manual therapy, massage, Sabillon taping, abdominal trunk stabilization, balance, balance/weight bearing training, neuromuscular re-education, postural training, self care, strengthening, stretching, therapeutic activities, gait training, functional ROM exercises, flexibility and home exercise program    Frequency: 1x week  Duration in weeks: 1  Plan of Care beginning date: 1/27/2025  Plan of Care expiration date: 2/10/2025  Treatment plan discussed with: patient  Plan details: 1 more visit, then DC to HEP      Subjective Evaluation    History of Present Illness  Date of surgery: 11/18/2024  Mechanism of injury: -patient is scheduled for L TKA on 11/18 with Dr. Rodriguez  -current LOF: hurts to walk, walking limited to about 1/4 mile, limited tolerance to prolonged sitting/driving, no limitation on stairs, limited and painful crouching/stooping  -lives at home with his spouse  -lives in 2 story home, FF of stairs with full bath upstairs/half bath downstairs  -walk in shower  -owns a walker  -patient's goal is to return directly home from the hospital after surgery    Post-op evaluation 11/21/24:  -underwent surgery L TKA as planned on 11/18  -did not DC from hospital until 11/20 due to variable blood sugar levels and varying BP  -patient reports taking Oxy this a.m. before his PT  -reports his L hip and buttock have been bothersome and painful since the surgery, has had sciatic pain in the past  -patient is ambulating with a walker, but reports he was having the most problem with getting up his steps last night  -he was  able to get up steps by sitting and scooting, able to come down steps with a single crutch and railing  -using a chair currently to shower  -slept in bed well last night, slept well  -can stand long enough to brush teeth and use toilet, but has not been standing/walking for more than just short household distances  -requires assistance with getting dressed  -requires assistance with getting into the car, was able to get out of car independently (grabbed pant leg to lift the leg)  -uses leg  at home to get in/out of bed or on/off couch    : Patient presents for re-evaluation of L knee sxs following TKA. Independent showering, dressing. Improved stairclimbing ability. Continued sleep disturbance. Higher level pain is less intense and frequent. Current complaints include balance instability, stiffness, soreness.     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     Additional Observation Details  Incision healed    Active Range of Motion   Left Knee   Flexion: 120 degrees   Extension: -2 degrees     Passive Range of Motion   Left Knee   Flexion: 120 degrees   Extension: -2 degrees     Additional Passive Range of  "Motion Details  Flexion measured in sitting    Strength/Myotome Testing     Left Hip   Planes of Motion   Abduction: 3+    Left Knee   Flexion: 5  Extension: 5  Quadriceps contraction: good    Additional Strength Details  Independent with supine SLR flexion without difficulty    Tests   Left Ankle/Foot   Negative for Homans' sign.     Ambulation     Ambulation: Level Surfaces   Ambulation without assistive device: independent    Ambulation: Stairs   Ascend stairs: independent  Pattern: reciprocal  Railings: two rails  Descend stairs: independent  Pattern: reciprocal  Railings: two rails    Additional Stairs Ambulation Details  Prefers 2 handrails, but is able to complete without rails if needed    Functional Assessment        Comments  TU seconds, no AD  Re-evaluation 24: 45 seconds with RW  Re-evaluation : TUG 15\", 14\" w/ SPC  Re-evaluation 25:     5x STS: 22\"  Re-evaluation 25:     JR. LEXIE Score: , Interval Score: 65.994 / 100  Re-evaluation 25:     RAPT score 12: plan to DC home from hospital            Precautions: none  CO-MORBIDITIES: HTN, h/o R TKA, diabetes, COPD, heart murmur  HEP ACCESS CODE:   FOTO Completed On: 25    POC Expires Reeval for Medicare to be completed Unit LImit Auth Expiration Date PT/OT/ST  Visit Limit   25 By visit  30 N/a N/a N/a    Completed on                TREATMENT DIARY  Auth Status DATE  1- (RE)    NA Visit # 15 16 17 18 19 20     Remaining 5 4 3 2 1 10    MANUAL THERAPY           L knee stretching   TM KG JG TM SK KG                                                           THERAPEUTIC EXERCISE           Nustep for ROM and endurance           Upright Bike  L5 x10 min seat 8 L7 X10 mins  Seat 8 L7 X10 mins  Seat 8 L7 X10 mins  Seat 8 L7 X10 mins  Seat 8 L7 X10 mins  Seat 8    SLR flex  2# 3x10 2# 1x10  3# 2x10 3 x 10 ,3# 3# 3x10 3# 3x10 3# 3x10    Supine hip abduction  Steamboats w/ RTB x20 ea Steamboats " "w/ RTB x20 ea Steamboats w/ RTB x20 ea Steamboats w/ RTB x20 ea Steamboats w/ RTB x20 ea Steamboats w/ RTB x20 ea    LAQ  DC to machine --        HR/TR  x30         Calf stretch  Standing  Fitter board  30\"x4 Fitter board  30\"x3 Fitter board  30\"x3 Fitter board  30\"x3 Fitter board  30\"x3 Fitter board  30\"x3    HS stretch   Standing, heel prop  30\"x3 Standing, heel prop  30\"x3 Standing, heel prop  30\"x3 Standing, heel prop  30\"x3 Standing  Heel pro  30\"x3    Single knee to chest  With towel assist  10\" x10 ea         Leg press  B/L 150# 2x10    LLE 90# 2x10 B/L 150# 3x10    LLE 90# 3x10 B/L 150# 3x10    LLE 90# 3x10 B/L 170# 2x15    # 2x15 B/L 170# 2x15    # 2x15 B/L 170# 2x15    # 2x15    Knee Ext machine  40# 3x10 40# 1x15  50# 1x15 2 x 15, 50# 1x15 50#   1x15 #60    2x15 #60 NV 2x15 #60  60# 3x10                          NEUROMUSCULAR REEDUCATION            Standing TKE with TB           PPT  5\" x20 5\" x20 20 x 5\" 5\"x20 5\"x20 held    Bridges  5\" x20 5\" x20 20 x 5\" 5\" x20 5\"x20 held    Supine Iso hip abduction with TB   --                                                                                                                      THERAPEUTIC ACTIVITY           Stair training           Mini squats   --        Step ups  8\" step fwd  3x10    8\" step lat  3x10 8\" step fwd  3x10    8\" step lat  3x10 8\" step fwd  3x10    8\" step lat  3x10 8\" step fwd  3x10    8\" step lat  3x10 8\" step fwd  3x10    8\" step lat  3x10 8\" step  Fwd and lat    X20 ea               GAIT TRAINING           Level surface                                            MODALITIES           CP post tx prn                               "

## 2025-01-28 ENCOUNTER — OFFICE VISIT (OUTPATIENT)
Dept: FAMILY MEDICINE CLINIC | Facility: CLINIC | Age: 65
End: 2025-01-28
Payer: MEDICARE

## 2025-01-28 VITALS
TEMPERATURE: 98 F | WEIGHT: 243 LBS | DIASTOLIC BLOOD PRESSURE: 84 MMHG | HEIGHT: 72 IN | OXYGEN SATURATION: 98 % | HEART RATE: 83 BPM | BODY MASS INDEX: 32.91 KG/M2 | SYSTOLIC BLOOD PRESSURE: 128 MMHG

## 2025-01-28 DIAGNOSIS — E11.29 TYPE 2 DIABETES MELLITUS WITH OTHER DIABETIC KIDNEY COMPLICATION (HCC): ICD-10-CM

## 2025-01-28 DIAGNOSIS — N18.31 STAGE 3A CHRONIC KIDNEY DISEASE (HCC): ICD-10-CM

## 2025-01-28 DIAGNOSIS — S00.451A FOREIGN BODY OF RIGHT EXTERNAL EAR: ICD-10-CM

## 2025-01-28 DIAGNOSIS — E87.1 HYPONATREMIA: ICD-10-CM

## 2025-01-28 DIAGNOSIS — K21.9 GASTROESOPHAGEAL REFLUX DISEASE, UNSPECIFIED WHETHER ESOPHAGITIS PRESENT: ICD-10-CM

## 2025-01-28 DIAGNOSIS — E78.5 DYSLIPIDEMIA: ICD-10-CM

## 2025-01-28 DIAGNOSIS — I10 ESSENTIAL HYPERTENSION: Primary | ICD-10-CM

## 2025-01-28 DIAGNOSIS — I48.0 PAROXYSMAL A-FIB (HCC): ICD-10-CM

## 2025-01-28 DIAGNOSIS — I10 HTN (HYPERTENSION): ICD-10-CM

## 2025-01-28 DIAGNOSIS — K22.0 ACHALASIA: ICD-10-CM

## 2025-01-28 PROCEDURE — 99214 OFFICE O/P EST MOD 30 MIN: CPT | Performed by: INTERNAL MEDICINE

## 2025-01-28 PROCEDURE — G2211 COMPLEX E/M VISIT ADD ON: HCPCS | Performed by: INTERNAL MEDICINE

## 2025-01-28 RX ORDER — ESOMEPRAZOLE MAGNESIUM 40 MG/1
40 CAPSULE, DELAYED RELEASE ORAL
Qty: 180 CAPSULE | Refills: 1 | Status: SHIPPED | OUTPATIENT
Start: 2025-01-28

## 2025-01-28 NOTE — PROGRESS NOTES
Name: Robert Benson      : 1960      MRN: [id number]  Encounter Provider: Srinivas Esquivel MD  Encounter Date: 2025   Encounter department: [facility] PRIMARY CARE      Assessment & Plan  1. Postoperative status following left knee arthroplasty.  He is about 10 weeks postoperative and is progressing well. He is about to be released from physical therapy and has resumed volunteer work. He will continue his current regimen, including iron tablets due to low iron levels post-surgery.    2. Foreign body in the right ear.  An attempt was made to remove the foreign body, but it was unsuccessful due to its deep wedging and associated cerumen impaction. There is a possibility of an underlying infection. He will be referred to an ENT specialist for further evaluation and management. Antibiotic eardrops may be considered if an infection is confirmed.    3. Diabetes mellitus.  His A1c levels have shown a significant decrease from 6.8 to 6.4, indicating effective glycemic control. He will continue his current regimen of metformin and Jardiance. A referral for diabetes education, including dietary counseling, will be provided.    4. Gastroesophageal reflux disease/achalasia.  His dental issues may be related to nocturnal acid reflux, a potential complication of his achalasia. The dosage of Nexium will be increased to 40 mg twice daily. He will continue to manage his condition with expectant management and follow up with his GI specialist as needed.  He is already had a surgical procedure for the achalasia and was told there really wasn't anything else to do unless he wanted to have esophagectomy and colonic interposition.    5. Hypercholesterolemia.  His total cholesterol level is 165, and his LDL cholesterol level is 100. He will continue his current regimen of atorvastatin 40 mg daily.    6. Chronic bronchitis.  He reports no current issues with coughing or wheezing and has cut back substantially on  Refill request for levothyroxine 0.075 mg  Last refill: 12-08-23  Last Visit: 7-21-23  Future appointment: lab appt today  Rx sent to provider - refill protocol failed     smoking.    PROCEDURE  The patient underwent a left knee replacement in November 2024.       History of Present Illness     History of Present Illness  The patient is a 64-year-old male who presents for a 6-month follow-up. He is accompanied by his wife.    He underwent a left knee replacement in November and is nearing the end of his physical therapy, with discharge expected tomorrow. He has resumed his volunteer work, indicating satisfactory progress.    He reports a minor issue with his right ear, specifically soreness following the removal of his hearing aid. He has been using hearing aids for the past 10 years without any previous complications. He plans to consult his ENT specialist regarding this issue.    His metformin dosage was reduced from two tablets to one, and Jardiance was added to his regimen by his nephrologist approximately 6 to 9 months ago. Despite this change, his A1c levels have remained consistent over the last six blood tests. The medication adjustment was made to protect his kidneys. He has expressed interest in consulting a dietitian for better health management, as offered by Medicare, and has requested a referral for the same.    He has been experiencing dental issues, including significant tooth fractures without associated pain. His dentist suspects that these fractures may be due to acid reflux at night, a potential side effect of his achalasia. He uses fluoride toothpaste and brushes multiple times daily. His dentist has recommended increasing his Nexium dosage, a suggestion he wishes to discuss with his primary care physician. He occasionally experiences heartburn, which typically lasts for a day or two, but has not had an episode recently. He reports no correlation between his heartburn and food intake. He has been managing achalasia since childhood, having undergone surgery at the age of 9 or 10. He does not currently follow up with a gastroenterologist for his achalasia, as he  has been informed that there are no further treatment options available. He manages his condition by sleeping with a wedge pillow.    He reports no issues with tachycardia. He was previously on Arixtra post-surgery but has since discontinued it and resumed his regular medications. He is approximately 10 weeks post-operation. He continues to take iron supplements due to low iron levels following surgery.    He has a history of chronic bronchitis but reports no current symptoms of coughing or wheezing. He has significantly reduced his smoking habit.    SOCIAL HISTORY  The patient has cut back on smoking substantially.    MEDICATIONS  Current: metformin, Jardiance, atorvastatin, Nexium, sodium chloride tablets, iron tablets  Discontinued: Arixtra     Review of Systems  Objective   /84 (BP Location: Right arm, Patient Position: Sitting, Cuff Size: Standard)   Pulse 83   Temp 98 °F (36.7 °C) (Temporal)   Ht 6' (1.829 m)   Wt 110 kg (243 lb)   SpO2 98%   BMI 32.96 kg/m²     Physical Exam    Physical Exam  Constitutional:       General: He is not in acute distress.     Appearance: Normal appearance. He is well-developed. He is not ill-appearing.   HENT:      Head:      Comments: He does have a clear plastic foreign body in the right external canal.  I attempted to remove it with forceps and tweezers and was unable to.  He did have some discomfort as I was trying to do that.  No purulence or erythema was noted.     Left Ear: Tympanic membrane and external ear normal.   Neck:      Vascular: No JVD.   Cardiovascular:      Rate and Rhythm: Normal rate and regular rhythm.      Heart sounds: Normal heart sounds. No murmur heard.     No friction rub. No gallop.   Pulmonary:      Breath sounds: Normal breath sounds.   Abdominal:      General: Bowel sounds are normal. There is no distension.      Palpations: Abdomen is soft. There is no mass.   Musculoskeletal:         General: No swelling.   Neurological:      Mental  Status: He is alert.

## 2025-01-28 NOTE — ASSESSMENT & PLAN NOTE
Lab Results   Component Value Date    EGFR 60 01/13/2025    EGFR 62 11/26/2024    EGFR 61 11/20/2024    CREATININE 1.24 01/13/2025    CREATININE 1.21 11/26/2024    CREATININE 1.22 11/20/2024

## 2025-01-29 RX ORDER — FENOFIBRATE 145 MG/1
145 TABLET, COATED ORAL DAILY
Qty: 100 TABLET | Refills: 1 | Status: SHIPPED | OUTPATIENT
Start: 2025-01-29

## 2025-01-29 RX ORDER — LISINOPRIL 5 MG/1
5 TABLET ORAL DAILY
Qty: 90 TABLET | Refills: 1 | Status: SHIPPED | OUTPATIENT
Start: 2025-01-29

## 2025-01-30 ENCOUNTER — OFFICE VISIT (OUTPATIENT)
Dept: PHYSICAL THERAPY | Facility: CLINIC | Age: 65
End: 2025-01-30
Payer: MEDICARE

## 2025-01-30 DIAGNOSIS — M17.12 PRIMARY OSTEOARTHRITIS OF LEFT KNEE: Primary | ICD-10-CM

## 2025-01-30 DIAGNOSIS — Z96.652 TOTAL KNEE REPLACEMENT STATUS, LEFT: ICD-10-CM

## 2025-01-30 PROCEDURE — 97140 MANUAL THERAPY 1/> REGIONS: CPT

## 2025-01-30 PROCEDURE — 97110 THERAPEUTIC EXERCISES: CPT

## 2025-01-30 NOTE — PROGRESS NOTES
"Daily Note     Today's date: 2025  Patient name: Robert Benson  : 1960  MRN: [id number]  Referring provider: Drake Vasquez,*  Dx:   Encounter Diagnosis     ICD-10-CM    1. Primary osteoarthritis of left knee  M17.12       2. Total knee replacement status, left  Z96.652                      Subjective: Pt denied pain at rest prior to PT. He feels that he can self manage his symptoms with an HEP.       Objective: See treatment diary below      Assessment: Tolerated treatment well. Patient demonstrated fatigue post treatment and exhibited good technique with therapeutic exercises. Updated HEP given to pt post Tx.       Plan: Pt to be DC from skilled PT by DPT.      Precautions: none  CO-MORBIDITIES: HTN, h/o R TKA, diabetes, COPD, heart murmur  HEP ACCESS CODE: Access Code: [email or web address]  URL: [email or web address]  Date: 2025  Prepared by: Kennedy Portillo    Exercises  - Gastroc Stretch with Foot at Wall  - 1 x daily - 7 x weekly - 3 sets - 30\" hold  - Standing Hamstring Stretch with Step  - 1 x daily - 7 x weekly - 3 sets - 30\" hold  - Step Up  - 1 x daily - 7 x weekly - 3 sets - 10 reps  - Lateral Step Up  - 1 x daily - 7 x weekly - 3 sets - 10 reps  - Standing 3-Way Leg Reach with Resistance at Ankles and Counter Support  - 1 x daily - 7 x weekly - 3 sets - 10 reps  - Supine Bridge  - 1 x daily - 7 x weekly - 2 sets - 10 reps - 5\" hold  - Supine Active Straight Leg Raise  - 1 x daily - 7 x weekly - 3 sets - 10 reps  FOTO Completed On: 25    POC Expires Reeval for Medicare to be completed Unit LImit Auth Expiration Date PT/OT/ST  Visit Limit   25 By visit  30 N/a N/a N/a    Completed on                TREATMENT DIARY  Auth Status DATE - (RE)    NA Visit # 15 16 17 18 19 20 21    Remaining 5 4 3 2 1 10 9   MANUAL THERAPY           L knee stretching   TM KG JG TM SK KG TM                                                          THERAPEUTIC " "EXERCISE           Nustep for ROM and endurance           Upright Bike  L5 x10 min seat 8 L7 X10 mins  Seat 8 L7 X10 mins  Seat 8 L7 X10 mins  Seat 8 L7 X10 mins  Seat 8 L7 X10 mins  Seat 8 L7 X10 mins  Seat 8   SLR flex  2# 3x10 2# 1x10  3# 2x10 3 x 10 ,3# 3# 3x10 3# 3x10 3# 3x10    Supine hip abduction  Steamboats w/ RTB x20 ea Steamboats w/ RTB x20 ea Steamboats w/ RTB x20 ea Steamboats w/ RTB x20 ea Steamboats w/ RTB x20 ea Steamboats w/ RTB x20 ea Steamboats w/ RTB x20 ea   LAQ  DC to machine --        HR/TR  x30         Calf stretch  Standing  Fitter board  30\"x4 Fitter board  30\"x3 Fitter board  30\"x3 Fitter board  30\"x3 Fitter board  30\"x3 Fitter board  30\"x3 Fitter board  30\"x3   HS stretch   Standing, heel prop  30\"x3 Standing, heel prop  30\"x3 Standing, heel prop  30\"x3 Standing, heel prop  30\"x3 Standing  Heel pro  30\"x3 Standing  Heel pro  30\"x3   Single knee to chest  With towel assist  10\" x10 ea         Leg press  B/L 150# 2x10    LLE 90# 2x10 B/L 150# 3x10    LLE 90# 3x10 B/L 150# 3x10    LLE 90# 3x10 B/L 170# 2x15    # 2x15 B/L 170# 2x15    # 2x15 B/L 170# 2x15    # 2x15 B/L 170# 2x15    # 2x15   Knee Ext machine  40# 3x10 40# 1x15  50# 1x15 2 x 15, 50# 1x15 50#   1x15 #60    2x15 #60 NV 2x15 #60  60# 3x10 60# 2/15                         NEUROMUSCULAR REEDUCATION            Standing TKE with TB           PPT  5\" x20 5\" x20 20 x 5\" 5\"x20 5\"x20 held    Bridges  5\" x20 5\" x20 20 x 5\" 5\" x20 5\"x20 held    Supine Iso hip abduction with TB   --                                                                                                                      THERAPEUTIC ACTIVITY           Stair training           Mini squats   --        Step ups  8\" step fwd  3x10    8\" step lat  3x10 8\" step fwd  3x10    8\" step lat  3x10 8\" step fwd  3x10    8\" step lat  3x10 8\" step fwd  3x10    8\" step lat  3x10 8\" step fwd  3x10    8\" step lat  3x10 8\" step  Fwd and lat    X20 ea 8\" " "step fwd  3x10    8\" step lat  3x10              GAIT TRAINING           Level surface                                            MODALITIES           CP post tx prn                               "

## 2025-02-06 NOTE — PROGRESS NOTES
Medical Nutrition Therapy        Assessment    Visit Type: Initial visit  Chief complaint/Medical Diagnosis/reason for visit E11.29    HPI Rinku was seen in person for the initial MNT appointment, accompanied by his wife, Jeny. Patient was pleasant and willing to share assessment information. BG levels are not checked at this time. Patient does take diabetes medication as prescribed.     Patient reported no current exercise regimen. Rinku stated that he had knee surgery recently, but was walking for exercise previously. Encouraged exercise as safe and approved by his doctor. Noted a 6 lb intentional weight loss in about 2 months.    Rinku came today with concern for his weight. He would like further weight loss.    Problems identified in food recall include meal skipping, inconsistent carbohydrate intake, high-fat and high-sodium convenience foods, high-fat dairy, limited non-starchy vegetables and whole grains, sugary beverages, and sweets. Consumption of carbohydrates ranges from 30 to ~150 grams per meal. Explained basic pathophysiology of diabetes and impact of diet on blood glucose levels and disease complications. Explained how sodium influences volume retention, blood pressure, and complications for heart disease, stroke, and CKD.     Provided patient with a 1810 calorie meal plan to assist with consistency, balance and portion control.  Encouraged the consumption of regular meals at regular times.  Advised patient to keep carbohydrate intake to 45 grams per meal and 15-20 per snack to assist with glycemic control.  Suggested keeping protein intake to 8 ounces a day and fat to 5 servings daily to assist with lipid management and calorie control. Portion booklet and food labels were used to teach basic carbohydrate counting. Patient agreed to keep daily food logs and return them in 2 months for assessment. RD will remain available for further dietary questions/concerns.     Ht Readings from Last 1 Encounters:    01/28/25 6' (1.829 m)     Wt Readings from Last 3 Encounters:   01/28/25 110 kg (243 lb)   01/07/25 109 kg (241 lb)   12/03/24 114 kg (252 lb)     Weight Change: Yes 6 lb intentional weight loss in about 2 months    Barriers to Learning: no barriers    Do you follow any special diet presently?: No  Who shops: spouse  Who cooks: spouse    Food Log: Completed via the method of food recall    Wakes up 6:30-7 am    Breakfast:9 am; (skips occasionally); bagel w/ butter and iced tea (no sugar) or hot tea (no sugar w/ coffee mate)   Morning Snack:cookies & chips  Lunch:12:30 pm; skips occasionally; sandwich (ham, cheese, turkey on roll w/ boyd, lettuce, tomato, sometimes white bread) OR 3 hot dogs w/ 3 slices white bread w/ mustard  Afternoon Snack: 2 packs of 6 peanut butter or cheese crackers OR bowl of chips or cheese curls OR 1/2 c peanuts  Dinner: 5:30-6 pm; 1 1/2 c pasta (enriched) w/ 1/2 c sauce (regular) w/ meatballs w/ 6 slices bread (Moroccan bread, about 1/2 inch slices) w/ butter and iced tea OR  chicken breast (skinless; spices) w/ veg (mixed- peas/corn/green beans/carrot) w/ 1 c stove top stuffing   Evening Snack: bowl of chips or cheese curls   Beverages: decaf green iced tea (unsweetened)  Eating out/Take out: Every other week; 2 slices pizza with fries (over 6 oz; ~70 g carb)  Exercise knee replacement; used to walk a couple miles daily w/ dog    Calorie needs 1810 kcals/day Carbs: 45 g/meal, 15-20 g/snack     Protein:8 ounces/day    Fat: 5 servings/day        Nutrition Diagnosis:  Food and nutrition related knowledge deficit  related to Lack of prior exposure to accurate nutrition related information as evidenced by Verbalizes inaccurate or incomplete information    Intervention: plate method, increased fiber intake, label reading, carbohydrate counting, meal timing, meal planning, monitoring portion control, and food diary     Treatment Goals: Patient understands education and recommendations, Patient  will monitor food intake daily with tracker, Patient will monitor fat intake, Patient will consume 3 meals a day, Patient will monitor portion control, Patient will count carbohydrates, and Patient will exercise    Monitoring and evaluation:    Term code indicator  FH 1.3.2 Food Intake Criteria: Eat 3 meals per day, 4-5 hours apart. No meal skipping. Eat your snack 2-3 hours away from your meals.   Term code indicator  FH 1.6.3 Carbohydrate Intake Criteria: at 30-45 grams of carbohydrate per meal, and no more than 15-20 grams per snack.  Term code indicator  CH 2.2 Treatments/Therapy/Alternative Medicine Criteria: Exercise as able and approved by your physician.     Materials Provided: portion book, 3-day food log    Patient’s Response to Instruction:  Comprehensiongood  Motivationgood  Expected Compliancegood    Begin Time: 3:27 pm  End Time: 4:41 pm  Referring Provider: Srinivas Esquivel MD    Thank you for coming to the [facility] for education today.  Please feel free to call with any questions or concerns.    Pamela Weiss, RD  [address] [facility]  [place] [id number]  [phone]

## 2025-02-11 ENCOUNTER — OFFICE VISIT (OUTPATIENT)
Dept: DIABETES SERVICES | Facility: CLINIC | Age: 65
End: 2025-02-11
Payer: MEDICARE

## 2025-02-11 VITALS — BODY MASS INDEX: 33.36 KG/M2 | WEIGHT: 246 LBS

## 2025-02-11 DIAGNOSIS — E11.29 TYPE 2 DIABETES MELLITUS WITH OTHER DIABETIC KIDNEY COMPLICATION (HCC): Primary | ICD-10-CM

## 2025-02-11 PROCEDURE — 97802 MEDICAL NUTRITION INDIV IN: CPT

## 2025-02-11 NOTE — PATIENT INSTRUCTIONS
Eat 3 meals per day, 4-5 hours apart. No meal skipping. Eat your snack 2-3 hours away from your meals.     Eat 30-45 grams of carbohydrate per meal, and no more than 15-20 grams per snack.    Exercise as able and approved by your physician.     Complete 3-day food log and return completed log at the follow up appointment

## 2025-02-25 ENCOUNTER — APPOINTMENT (OUTPATIENT)
Dept: RADIOLOGY | Facility: CLINIC | Age: 65
End: 2025-02-25
Payer: MEDICARE

## 2025-02-25 ENCOUNTER — OFFICE VISIT (OUTPATIENT)
Dept: OBGYN CLINIC | Facility: CLINIC | Age: 65
End: 2025-02-25
Payer: MEDICARE

## 2025-02-25 VITALS — WEIGHT: 246 LBS | BODY MASS INDEX: 33.32 KG/M2 | HEIGHT: 72 IN

## 2025-02-25 DIAGNOSIS — Z96.652 S/P TOTAL KNEE REPLACEMENT, LEFT: ICD-10-CM

## 2025-02-25 DIAGNOSIS — M17.12 PRIMARY OSTEOARTHRITIS OF LEFT KNEE: ICD-10-CM

## 2025-02-25 DIAGNOSIS — Z96.652 S/P TOTAL KNEE REPLACEMENT, LEFT: Primary | ICD-10-CM

## 2025-02-25 PROCEDURE — 73562 X-RAY EXAM OF KNEE 3: CPT

## 2025-02-25 PROCEDURE — 99213 OFFICE O/P EST LOW 20 MIN: CPT | Performed by: ORTHOPAEDIC SURGERY

## 2025-02-25 NOTE — PROGRESS NOTES
Assessment/Plan:   Assessment & Plan  S/P total knee replacement, left    Orders:    XR knee 3 vw left non injury; Future      Treatments a pleasant 64-year-old male who presents today for 3-month follow-up status post left total knee arthroplasty performed on 11/18/2024.  Overall he is doing well at this time.  He is able to range of motion the knee from 0-120  He reports some get up and go stiffness in the mornings when waking up.  However this does alleviate with activity.  New x-rays of the left knee with today's visit reviewed with patient which demonstrate stable alignment of left total knee arthroplasty without evidence of hardware complication.  Patient was instructed to continue his home exercises as he has completed a course of physical therapy.  He is to  follow-up in 3 months with repeat x-rays of the left knee.    The patient is doing quite well in regards to his left total knee replacement from 3 months ago.  There is full strength full motion.  No instability.  X-rays show anatomic placement the prosthesis.  Continue home exercise program.  Follow-up 3 months evaluation with new x-rays left knee-3 views    Subjective:   Patient ID: Robert Benson  1960     HPI  Patient is a 64 y.o. male who presents for 3 month follow-up status post left total knee arthroplasty, DOS 11/18/2024. Overall, the patient feels he is recovery well at this time. He denies pain at rest. He notes some tightness. He reports stiffness in the mornings.  He reports mild discomfort with walking. He has completed a course of PT and is compliant with his HEP.    The following portions of the patient's history were reviewed and updated as appropriate:  Past medical history, past surgical history, Family history, social history, current medications and allergies    Past Medical History:   Diagnosis Date    Bilateral flank pain 05/11/2023    CAD (coronary artery disease)     nonobstructive RCA via CT 2013    CKD (chronic kidney  disease)     Colon polyp     COPD (chronic obstructive pulmonary disease) (HCC)     CPAP (continuous positive airway pressure) dependence     Diabetes mellitus (HCC)     GERD (gastroesophageal reflux disease)     Heart murmur     Hyperlipidemia     Hypertension     PAF (paroxysmal atrial fibrillation) (HCC)     PONV (postoperative nausea and vomiting)     Sleep apnea     cpap    Wears hearing aid        Past Surgical History:   Procedure Laterality Date    APPENDECTOMY N/A     COLONOSCOPY      JOINT REPLACEMENT Right 2014    Knee    MYOTOMY HELLER LAPAROSCOPIC  2018    [employer]    [place] ARTHRP KNE CONDYLE&PLATU MEDIAL&LAT COMPARTMENTS Left 11/18/2024    Procedure: ARTHROPLASTY KNEE TOTAL;  Surgeon: Ehsan Rodriguez DO;  Location: [facility];  Service: Orthopedics    STOMACH SURGERY         Family History   Problem Relation Age of Onset    Cancer Mother         stomach    Heart attack Father     Cancer Sister         bladder    Cancer Brother         colon       Social History     Socioeconomic History    Marital status: /Civil Union     Spouse name: None    Number of children: None    Years of education: None    Highest education level: None   Occupational History    None   Tobacco Use    Smoking status: Some Days     Types: Cigars     Passive exposure: Past    Smokeless tobacco: Never    Tobacco comments:     Cigars 5/weeks   Vaping Use    Vaping status: Never Used   Substance and Sexual Activity    Alcohol use: Not Currently     Comment: rarely    Drug use: Never    Sexual activity: Not Currently   Other Topics Concern    None   Social History Narrative    None     Social Drivers of Health     Financial Resource Strain: Not on file   Food Insecurity: No Food Insecurity (11/18/2024)    Nursing - Inadequate Food Risk Classification     Worried About Running Out of Food in the Last Year: Never true     Ran Out of Food in the Last Year: Never true     Ran Out of Food in the Last Year: Never true   Transportation  Needs: No Transportation Needs (2024)    Nursing - Transportation Risk Classification     Lack of Transportation: Not on file     Lack of Transportation: No   Physical Activity: Not on file   Stress: Not on file   Social Connections: Not on file   Intimate Partner Violence: Unknown (2024)    Nursing IPS     Feels Physically and Emotionally Safe: Not on file     Physically Hurt by Someone: Not on file     Humiliated or Emotionally Abused by Someone: Not on file     Physically Hurt by Someone: No     Hurt or Threatened by Someone: No   Housing Stability: Unknown (2024)    Nursing: Inadequate Housing Risk Classification     Has Housing: Not on file     Worried About Losing Housing: Not on file     Unable to Get Utilities: Not on file     Unable to Pay for Housing in the Last Year: No     Has Housin         Current Outpatient Medications:     acetaminophen (TYLENOL) 325 mg tablet, Take 2 tablets (650 mg total) by mouth every 4 (four) hours as needed for mild pain (fever > 100.5), Disp: , Rfl:     atorvastatin (LIPITOR) 40 mg tablet, Take 1 tablet (40 mg total) by mouth every evening, Disp: 90 tablet, Rfl: 5    esomeprazole (NexIUM) 40 MG capsule, Take 1 capsule (40 mg total) by mouth 2 (two) times a day before meals, Disp: 180 capsule, Rfl: 1    fenofibrate (TRICOR) 145 mg tablet, Take 1 tablet (145 mg total) by mouth daily, Disp: 100 tablet, Rfl: 1    ferrous sulfate 324 (65 Fe) mg, TAKE 1 TABLET BY MOUTH 2 TIMES A DAY BEFORE MEALS DO NOT START BEFORE 2024., Disp: 60 tablet, Rfl: 5    Jardiance 10 MG TABS tablet, TAKE 1 TABLET BY MOUTH EVERY DAY, Disp: 90 tablet, Rfl: 1    lisinopril (ZESTRIL) 5 mg tablet, Take 1 tablet (5 mg total) by mouth daily, Disp: 90 tablet, Rfl: 1    metFORMIN (GLUCOPHAGE) 500 mg tablet, Take 1 tablet (500 mg total) by mouth daily with breakfast, Disp: 90 tablet, Rfl: 3    sodium chloride 1 g tablet, TAKE 1 TABLET (1 G TOTAL) BY MOUTH EVERY OTHER DAY, Disp: 45  tablet, Rfl: 1    amoxicillin (AMOXIL) 500 mg capsule, Take 2,000 mg by mouth as needed 1 hour prior to dental appointment (Patient not taking: Reported on 10/15/2024), Disp: , Rfl:     ascorbic acid (VITAMIN C) 500 MG tablet, Take 1 tablet (500 mg total) by mouth 2 (two) times a day Do not start before October 17, 2024., Disp: 60 tablet, Rfl: 1    folic acid (FOLVITE) 1 mg tablet, Take 1 tablet (1 mg total) by mouth daily Do not start before October 17, 2024., Disp: 30 tablet, Rfl: 1    Multiple Vitamins-Minerals (multivitamin with minerals) tablet, Take 1 tablet by mouth daily Do not start before October 17, 2024., Disp: 30 tablet, Rfl: 1    nicotine polacrilex (Nicotine Mini) 2 MG lozenge, Apply 1 lozenge (2 mg total) to the mouth or throat as needed for smoking cessation (Patient not taking: Reported on 2/25/2025), Disp: 81 lozenge, Rfl: 3    No Known Allergies    Review of Systems   Constitutional:  Negative for chills and fever.   HENT:  Negative for ear pain and sore throat.    Eyes:  Negative for pain and visual disturbance.   Respiratory:  Negative for cough and shortness of breath.    Cardiovascular:  Negative for chest pain and palpitations.   Gastrointestinal:  Negative for abdominal pain and vomiting.   Genitourinary:  Negative for dysuria and hematuria.   Musculoskeletal:  Negative for arthralgias and back pain.   Skin:  Negative for color change and rash.   Neurological:  Negative for seizures and syncope.   All other systems reviewed and are negative.       Objective:  Ht 6' (1.829 m)   Wt 112 kg (246 lb)   BMI 33.36 kg/m²     Ortho Exam  left knee(s) -   Patient ambulates with steady gait pattern  Uses No assistive device  No anatomical deformity  Skin is warm and dry to touch with no signs of erythema, ecchymosis, or infection   Mild generalized soft tissue swelling or effusion noted  ROM (0° - 120°)   Strength: 5/5 throughout  No tenderness to palpation on exam  Flexor and extensor mechanisms  are intact   Knee is stable to varus and valgus stress  Negative Anterior Drawer, Negative  Posterior Drawer  Patella tracks centrally without palpable crepitus  Calf compartments are soft and supple  Negative Marky's sign  2+ DP and PT pulses with brisk capillary refill to the toes  Sural, saphenous, tibial, superficial, and deep peroneal motor and sensory distributions intact  Sensation light touch intact distally      Physical Exam  Vitals and nursing note reviewed.   Constitutional:       General: He is not in acute distress.     Appearance: He is well-developed.   HENT:      Head: Normocephalic and atraumatic.   Eyes:      Conjunctiva/sclera: Conjunctivae normal.   Cardiovascular:      Rate and Rhythm: Normal rate and regular rhythm.      Heart sounds: No murmur heard.  Pulmonary:      Effort: Pulmonary effort is normal. No respiratory distress.      Breath sounds: Normal breath sounds.   Abdominal:      Palpations: Abdomen is soft.      Tenderness: There is no abdominal tenderness.   Musculoskeletal:         General: No swelling.      Cervical back: Neck supple.   Skin:     General: Skin is warm and dry.      Capillary Refill: Capillary refill takes less than 2 seconds.   Neurological:      Mental Status: He is alert.   Psychiatric:         Mood and Affect: Mood normal.          Diagnostic Test Review:  Xrays of the left knee were obtained during today's visit and reviewed which demonstrates stable alignment of left total knee arthroplasty without evidence of hardware complication.  No acute fractures or dislocations.  I do not have radiology report.    Procedures   No procedures performed during today's visit.    Scribe Attestation      I,:  Fabiana Bran am acting as a scribe while in the presence of the attending physician.:       I,:  Ehsan Rodriguez, DO personally performed the services described in this documentation    as scribed in my presence.:

## 2025-03-04 ENCOUNTER — HOSPITAL ENCOUNTER (OUTPATIENT)
Dept: GASTROENTEROLOGY | Facility: HOSPITAL | Age: 65
Setting detail: OUTPATIENT SURGERY
Discharge: HOME | End: 2025-03-04
Attending: INTERNAL MEDICINE
Payer: MEDICARE

## 2025-03-04 ENCOUNTER — ANESTHESIA EVENT (OUTPATIENT)
Dept: GASTROENTEROLOGY | Facility: HOSPITAL | Age: 65
End: 2025-03-04
Payer: MEDICARE

## 2025-03-04 ENCOUNTER — ANESTHESIA (OUTPATIENT)
Dept: GASTROENTEROLOGY | Facility: HOSPITAL | Age: 65
End: 2025-03-04
Payer: MEDICARE

## 2025-03-04 VITALS
SYSTOLIC BLOOD PRESSURE: 126 MMHG | BODY MASS INDEX: 32.14 KG/M2 | HEART RATE: 58 BPM | OXYGEN SATURATION: 96 % | WEIGHT: 237 LBS | DIASTOLIC BLOOD PRESSURE: 79 MMHG | TEMPERATURE: 96.7 F | RESPIRATION RATE: 18 BRPM

## 2025-03-04 DIAGNOSIS — K63.5 CECAL POLYP: ICD-10-CM

## 2025-03-04 PROCEDURE — 45390 COLONOSCOPY W/RESECTION: CPT | Performed by: INTERNAL MEDICINE

## 2025-03-04 PROCEDURE — 88305 TISSUE EXAM BY PATHOLOGIST: CPT | Performed by: PATHOLOGY

## 2025-03-04 RX ORDER — PROPOFOL 10 MG/ML
INJECTION, EMULSION INTRAVENOUS AS NEEDED
Status: DISCONTINUED | OUTPATIENT
Start: 2025-03-04 | End: 2025-03-04

## 2025-03-04 RX ORDER — LIDOCAINE HYDROCHLORIDE 10 MG/ML
INJECTION, SOLUTION EPIDURAL; INFILTRATION; INTRACAUDAL; PERINEURAL AS NEEDED
Status: DISCONTINUED | OUTPATIENT
Start: 2025-03-04 | End: 2025-03-04

## 2025-03-04 RX ORDER — SODIUM CHLORIDE 9 MG/ML
INJECTION, SOLUTION INTRAVENOUS CONTINUOUS PRN
Status: DISCONTINUED | OUTPATIENT
Start: 2025-03-04 | End: 2025-03-04

## 2025-03-04 RX ORDER — SODIUM CHLORIDE, SODIUM LACTATE, POTASSIUM CHLORIDE, CALCIUM CHLORIDE 600; 310; 30; 20 MG/100ML; MG/100ML; MG/100ML; MG/100ML
125 INJECTION, SOLUTION INTRAVENOUS CONTINUOUS
Status: DISCONTINUED | OUTPATIENT
Start: 2025-03-04 | End: 2025-03-08 | Stop reason: HOSPADM

## 2025-03-04 RX ADMIN — PROPOFOL 30 MG: 10 INJECTION, EMULSION INTRAVENOUS at 11:03

## 2025-03-04 RX ADMIN — PROPOFOL 30 MG: 10 INJECTION, EMULSION INTRAVENOUS at 10:32

## 2025-03-04 RX ADMIN — PROPOFOL 50 MG: 10 INJECTION, EMULSION INTRAVENOUS at 10:46

## 2025-03-04 RX ADMIN — PROPOFOL 30 MG: 10 INJECTION, EMULSION INTRAVENOUS at 11:00

## 2025-03-04 RX ADMIN — LIDOCAINE HYDROCHLORIDE 50 MG: 10 INJECTION, SOLUTION EPIDURAL; INFILTRATION; INTRACAUDAL at 10:31

## 2025-03-04 RX ADMIN — PROPOFOL 100 MG: 10 INJECTION, EMULSION INTRAVENOUS at 10:31

## 2025-03-04 RX ADMIN — PROPOFOL 50 MG: 10 INJECTION, EMULSION INTRAVENOUS at 10:55

## 2025-03-04 RX ADMIN — PROPOFOL 20 MG: 10 INJECTION, EMULSION INTRAVENOUS at 10:34

## 2025-03-04 RX ADMIN — PROPOFOL 30 MG: 10 INJECTION, EMULSION INTRAVENOUS at 10:36

## 2025-03-04 RX ADMIN — PROPOFOL 40 MG: 10 INJECTION, EMULSION INTRAVENOUS at 10:38

## 2025-03-04 RX ADMIN — SODIUM CHLORIDE: 0.9 INJECTION, SOLUTION INTRAVENOUS at 10:27

## 2025-03-04 RX ADMIN — PROPOFOL 50 MG: 10 INJECTION, EMULSION INTRAVENOUS at 10:51

## 2025-03-04 NOTE — ANESTHESIA POSTPROCEDURE EVALUATION
Post-Op Assessment Note    CV Status:  Stable  Pain Score: 0    Pain management: adequate       Mental Status:  Alert and awake   Hydration Status:  Euvolemic and stable   PONV Controlled:  Controlled   Airway Patency:  Patent and adequate     Post Op Vitals Reviewed: Yes    No anethesia notable event occurred.    Staff: CRNA       Last Filed PACU Vitals:  Vitals Value Taken Time   Temp 96.7 °F (35.9 °C) 03/04/25 1114   Pulse 88 03/04/25 1114   /62 03/04/25 1114   Resp 18 03/04/25 1114   SpO2 96 % 03/04/25 1114

## 2025-03-04 NOTE — ANESTHESIA PREPROCEDURE EVALUATION
Procedure:  COLONOSCOPY    Relevant Problems   ANESTHESIA   (+) PONV (postoperative nausea and vomiting)      CARDIO   (+) CAD (coronary artery disease)   (+) Essential hypertension   (+) Paroxysmal A-fib (HCC)      ENDO   (+) Diabetes mellitus, type II (HCC)      /RENAL   (+) Chronic kidney disease (CKD), stage II (mild)   (+) Stage 3a chronic kidney disease (HCC)      HEMATOLOGY   (+) ABLA (acute blood loss anemia)      MUSCULOSKELETAL   (+) Primary osteoarthritis of left knee      NEURO/PSYCH   (+) Diabetes mellitus due to underlying condition with diabetic neuropathy, without long-term current use of insulin (HCC)      PULMONARY   (+) COPD (chronic obstructive pulmonary disease) (HCC)   (+) Sleep apnea      PONV  COPD, RAJNI CPAP    Physical Exam    Airway    Mallampati score: II  TM Distance: >3 FB  Neck ROM: full     Dental        Cardiovascular  Cardiovascular exam normal    Pulmonary  Pulmonary exam normal     Other Findings      Anesthesia Plan  ASA Score- 3     Anesthesia Type- IV sedation with anesthesia with ASA Monitors.         Additional Monitors:     Airway Plan:            Plan Factors-    Chart reviewed. EKG reviewed.  Existing labs reviewed. Patient summary reviewed.                  Induction- intravenous.    Postoperative Plan-         Informed Consent- Anesthetic plan and risks discussed with patient.  I personally reviewed this patient with the CRNA. Discussed and agreed on the Anesthesia Plan with the CRNA..      NPO Status:  Vitals Value Taken Time   Date of last liquid 03/04/25 03/04/25 0925   Time of last liquid 0330 03/04/25 0925   Date of last solid 03/02/25 03/04/25 0925   Time of last solid 2000 03/04/25 0925

## 2025-03-05 DIAGNOSIS — E78.5 DYSLIPIDEMIA: ICD-10-CM

## 2025-03-06 RX ORDER — ATORVASTATIN CALCIUM 40 MG/1
40 TABLET, FILM COATED ORAL EVERY EVENING
Qty: 90 TABLET | Refills: 1 | Status: SHIPPED | OUTPATIENT
Start: 2025-03-06

## 2025-03-06 RX ORDER — ATORVASTATIN CALCIUM 40 MG/1
40 TABLET, FILM COATED ORAL EVERY EVENING
Qty: 90 TABLET | Refills: 5 | OUTPATIENT
Start: 2025-03-06

## 2025-03-10 ENCOUNTER — RESULTS FOLLOW-UP (OUTPATIENT)
Dept: GASTROENTEROLOGY | Facility: CLINIC | Age: 65
End: 2025-03-10

## 2025-03-10 PROCEDURE — 88305 TISSUE EXAM BY PATHOLOGIST: CPT | Performed by: PATHOLOGY

## 2025-03-11 ENCOUNTER — TELEPHONE (OUTPATIENT)
Age: 65
End: 2025-03-11

## 2025-03-11 NOTE — TELEPHONE ENCOUNTER
Attempted to contact patient in regards to rescheduling an appointment. Voicemail was left regarding new appointment date and time, requested pt call back to confirm message received.

## 2025-03-17 DIAGNOSIS — E11.29 TYPE 2 DIABETES MELLITUS WITH MICROALBUMINURIA, WITHOUT LONG-TERM CURRENT USE OF INSULIN (HCC): ICD-10-CM

## 2025-03-17 DIAGNOSIS — Z72.0 TOBACCO USE: ICD-10-CM

## 2025-03-17 DIAGNOSIS — R80.9 TYPE 2 DIABETES MELLITUS WITH MICROALBUMINURIA, WITHOUT LONG-TERM CURRENT USE OF INSULIN (HCC): ICD-10-CM

## 2025-03-18 RX ORDER — POLYETHYLENE GLYCOL 3350 17 G
2 POWDER IN PACKET (EA) ORAL AS NEEDED
Qty: 81 LOZENGE | Refills: 5 | Status: SHIPPED | OUTPATIENT
Start: 2025-03-18

## 2025-04-11 NOTE — PROGRESS NOTES
Medical Nutrition Therapy        Assessment    Visit Type: Follow-up visit  Chief complaint/Medical Diagnosis/reason for visit E11.29    HPI Rinku was seen in person for the follow-up MNT appointment, accompanied by his wife, Jeny. BG levels are not checked at this time. Patient does take diabetes medication as prescribed.     Patient reported no current exercise regimen. Rinku's knee is healed. He is now ready to start walking and set a goal to walk three miles for four days out of the week.  Noted an intentional weight loss of 17 pounds in about 2 month time duration.  Rinku has a goal weight of 190 pounds.    Rinku brought his completed 3-day food log back for assessment. We discussed topics such as total carbs versus net carbs, digestion of the macronutrients and how they impact BG levels, and balanced meals.  Rinku is pleased with his progress and stated that he has been following the meal plan and it is working well for him.    Problems identified in food recall include inconsistent carbohydrate intake, limited non-starchy vegetables and whole grains. Consumption of carbohydrates ranges from 0 to 45 grams per meal.     Portion booklet and food labels were used to teach basic carbohydrate counting. Patient agreed to keep daily food logs and return them in 6 months for assessment. RD will remain available for further dietary questions/concerns.     Ht Readings from Last 1 Encounters:   02/25/25 6' (1.829 m)     Wt Readings from Last 3 Encounters:   03/04/25 108 kg (237 lb)   02/25/25 112 kg (246 lb)   02/11/25 112 kg (246 lb)     Weight Change: Yes intentional weight loss of 17 pounds in about 2 month time duration.    Barriers to Learning: no barriers    Do you follow any special diet presently?: No  Who shops: patient and spouse  Who cooks: patient and spouse    Food Log: Please see scanned log    Calorie needs 1810 kcals/day           Carbs: 45 g/meal, 15-20 g/snack           Protein:8 ounces/day    Fat: 5  servings/day        Nutrition Diagnosis:  Inconsistent carbohydrate intake  intake related to Food and nutrition related knowledge deficit concerning appropriate amount and timing of carbohydrate intake as evidenced by  Estimated carbohydrate intake that is different from recommended types or ingested on an irregular basis    Intervention: increased plant based foods, meal planning, exercise guidelines, and food diary     Treatment Goals: Patient understands education and recommendations, Patient will monitor food intake daily with tracker, Patient will monitor fat intake, Patient will consume 3 meals a day, Patient will increase their intake of plant based foods, Patient will count carbohydrates, Patient will exercise, and Patient will monitor blood glucose    Monitoring and evaluation:    Term code indicator  FH 1.6.4 Fiber Intake and FH 1.7.2 Mineral/Element Intake Criteria: Add non-starchy vegetables in at meals.  Term code indicator  FH 1.5.3 Caffeine Intake Criteria: Try decaf tea to see if it is an acceptable substitute for regular.  Term code indicator  FH 1.6.1 Fat and Cholesterol Intake Criteria: Substitute 1% milk in place of creamer.  Term code indicator  CH 2.2 Treatments/Therapy/Alternative Medicine Criteria: Start walking 4x weekly for 3 miles.    Materials Provided: portion book, 3-day food log    Patient’s Response to Instruction:  Comprehensiongood  Motivationgood  Expected Compliancegood    Begin Time: 10:00 am  End Time: 10:40 am  Referring Provider: Srinivas Esquivel MD     Thank you for coming to the [facility] for education today.  Please feel free to call with any questions or concerns.    Pamela Weiss, RD  [address]  [place] [address]  [phone]

## 2025-04-15 ENCOUNTER — OFFICE VISIT (OUTPATIENT)
Dept: DIABETES SERVICES | Facility: CLINIC | Age: 65
End: 2025-04-15
Payer: MEDICARE

## 2025-04-15 VITALS — BODY MASS INDEX: 31.09 KG/M2 | WEIGHT: 229.2 LBS

## 2025-04-15 DIAGNOSIS — E11.29 TYPE 2 DIABETES MELLITUS WITH OTHER DIABETIC KIDNEY COMPLICATION (HCC): Primary | ICD-10-CM

## 2025-04-15 PROCEDURE — 97803 MED NUTRITION INDIV SUBSEQ: CPT

## 2025-04-15 NOTE — PATIENT INSTRUCTIONS
Add non-starchy vegetables in at meals.    Try decaf tea to see if it is an acceptable substitute for regular.    Substitute 1% milk in place of creamer.    Start walking 4x weekly for 3 miles.

## 2025-05-12 DIAGNOSIS — Z72.0 TOBACCO USE: ICD-10-CM

## 2025-05-13 ENCOUNTER — TELEPHONE (OUTPATIENT)
Age: 65
End: 2025-05-13

## 2025-05-13 RX ORDER — NICOTINE POLACRILEX 2 MG/1
LOZENGE ORAL
Qty: 81 LOZENGE | Refills: 5 | OUTPATIENT
Start: 2025-05-13

## 2025-05-13 NOTE — TELEPHONE ENCOUNTER
PA for nicotine polacrilex 2MG lozenge CANCELLED due to     []Approval on file-dates approved   [x]Medication already on Formulary  []Brand Name Preferred  []Patient no longer covered by insurance  []Therapy Changed/Medication Discontinued

## 2025-05-13 NOTE — TELEPHONE ENCOUNTER
PA for nicotine polacrilex 2MG lozenge SUBMITTED to [employer] [facility]    via    [x]CMM-KEY: [id number]  []Surescripts-Case ID #   []Availity-Auth ID # NDC #   []Faxed to plan   []Other website   []Phone call Case ID #     [x]PA sent as URGENT    All office notes, labs and other pertaining documents and studies sent. Clinical questions answered. Awaiting determination from insurance company.     Turnaround time for your insurance to make a decision on your Prior Authorization can take 7-21 business days.

## 2025-06-03 ENCOUNTER — APPOINTMENT (OUTPATIENT)
Dept: RADIOLOGY | Facility: CLINIC | Age: 65
End: 2025-06-03
Attending: ORTHOPAEDIC SURGERY
Payer: MEDICARE

## 2025-06-03 ENCOUNTER — OFFICE VISIT (OUTPATIENT)
Dept: OBGYN CLINIC | Facility: CLINIC | Age: 65
End: 2025-06-03
Payer: MEDICARE

## 2025-06-03 ENCOUNTER — APPOINTMENT (OUTPATIENT)
Age: 65
End: 2025-06-03
Attending: INTERNAL MEDICINE
Payer: MEDICARE

## 2025-06-03 VITALS — BODY MASS INDEX: 30.75 KG/M2 | WEIGHT: 227 LBS | HEIGHT: 72 IN

## 2025-06-03 DIAGNOSIS — E66.01 OBESITY, MORBID (HCC): ICD-10-CM

## 2025-06-03 DIAGNOSIS — E11.29 TYPE 2 DIABETES MELLITUS WITH DIABETIC MICROALBUMINURIA, WITHOUT LONG-TERM CURRENT USE OF INSULIN (HCC): ICD-10-CM

## 2025-06-03 DIAGNOSIS — Z96.652 S/P TOTAL KNEE REPLACEMENT, LEFT: Primary | ICD-10-CM

## 2025-06-03 DIAGNOSIS — E87.1 HYPONATREMIA: ICD-10-CM

## 2025-06-03 DIAGNOSIS — Z96.652 S/P TOTAL KNEE REPLACEMENT, LEFT: ICD-10-CM

## 2025-06-03 DIAGNOSIS — D47.2 MGUS (MONOCLONAL GAMMOPATHY OF UNKNOWN SIGNIFICANCE): ICD-10-CM

## 2025-06-03 DIAGNOSIS — R80.9 TYPE 2 DIABETES MELLITUS WITH DIABETIC MICROALBUMINURIA, WITHOUT LONG-TERM CURRENT USE OF INSULIN (HCC): ICD-10-CM

## 2025-06-03 DIAGNOSIS — I10 ESSENTIAL HYPERTENSION: ICD-10-CM

## 2025-06-03 DIAGNOSIS — N18.31 STAGE 3A CHRONIC KIDNEY DISEASE (HCC): ICD-10-CM

## 2025-06-03 DIAGNOSIS — E08.40 DIABETES MELLITUS DUE TO UNDERLYING CONDITION WITH DIABETIC NEUROPATHY, WITHOUT LONG-TERM CURRENT USE OF INSULIN (HCC): ICD-10-CM

## 2025-06-03 LAB
ALBUMIN SERPL BCG-MCNC: 4.5 G/DL (ref 3.5–5)
ALP SERPL-CCNC: 57 U/L (ref 34–104)
ALT SERPL W P-5'-P-CCNC: 36 U/L (ref 7–52)
ANION GAP SERPL CALCULATED.3IONS-SCNC: 10 MMOL/L (ref 4–13)
AST SERPL W P-5'-P-CCNC: 35 U/L (ref 13–39)
BACTERIA UR QL AUTO: ABNORMAL /HPF
BASOPHILS # BLD AUTO: 0.04 THOUSANDS/ÂΜL (ref 0–0.1)
BASOPHILS NFR BLD AUTO: 1 % (ref 0–1)
BILIRUB SERPL-MCNC: 0.67 MG/DL (ref 0.2–1)
BILIRUB UR QL STRIP: NEGATIVE
BUN SERPL-MCNC: 16 MG/DL (ref 5–25)
CALCIUM SERPL-MCNC: 9.5 MG/DL (ref 8.4–10.2)
CHLORIDE SERPL-SCNC: 99 MMOL/L (ref 96–108)
CHOLEST SERPL-MCNC: 145 MG/DL (ref ?–200)
CLARITY UR: CLEAR
CO2 SERPL-SCNC: 23 MMOL/L (ref 21–32)
COLOR UR: ABNORMAL
CREAT SERPL-MCNC: 1.29 MG/DL (ref 0.6–1.3)
CREAT UR-MCNC: 97.4 MG/DL
EOSINOPHIL # BLD AUTO: 0.16 THOUSAND/ÂΜL (ref 0–0.61)
EOSINOPHIL NFR BLD AUTO: 2 % (ref 0–6)
ERYTHROCYTE [DISTWIDTH] IN BLOOD BY AUTOMATED COUNT: 15.2 % (ref 11.6–15.1)
EST. AVERAGE GLUCOSE BLD GHB EST-MCNC: 140 MG/DL
GFR SERPL CREATININE-BSD FRML MDRD: 57 ML/MIN/1.73SQ M
GLUCOSE P FAST SERPL-MCNC: 104 MG/DL (ref 65–99)
GLUCOSE UR STRIP-MCNC: ABNORMAL MG/DL
HBA1C MFR BLD: 6.5 %
HCT VFR BLD AUTO: 38.8 % (ref 36.5–49.3)
HDLC SERPL-MCNC: 33 MG/DL
HGB BLD-MCNC: 12.7 G/DL (ref 12–17)
HGB UR QL STRIP.AUTO: NEGATIVE
IMM GRANULOCYTES # BLD AUTO: 0.02 THOUSAND/UL (ref 0–0.2)
IMM GRANULOCYTES NFR BLD AUTO: 0 % (ref 0–2)
KETONES UR STRIP-MCNC: NEGATIVE MG/DL
LDLC SERPL CALC-MCNC: 83 MG/DL (ref 0–100)
LEUKOCYTE ESTERASE UR QL STRIP: ABNORMAL
LYMPHOCYTES # BLD AUTO: 1.96 THOUSANDS/ÂΜL (ref 0.6–4.47)
LYMPHOCYTES NFR BLD AUTO: 28 % (ref 14–44)
MAGNESIUM SERPL-MCNC: 1.8 MG/DL (ref 1.9–2.7)
MCH RBC QN AUTO: 26.7 PG (ref 26.8–34.3)
MCHC RBC AUTO-ENTMCNC: 32.7 G/DL (ref 31.4–37.4)
MCV RBC AUTO: 82 FL (ref 82–98)
MICROALBUMIN UR-MCNC: 74.1 MG/L
MICROALBUMIN/CREAT 24H UR: 76 MG/G CREATININE (ref 0–30)
MONOCYTES # BLD AUTO: 0.64 THOUSAND/ÂΜL (ref 0.17–1.22)
MONOCYTES NFR BLD AUTO: 9 % (ref 4–12)
NEUTROPHILS # BLD AUTO: 4.25 THOUSANDS/ÂΜL (ref 1.85–7.62)
NEUTS SEG NFR BLD AUTO: 60 % (ref 43–75)
NITRITE UR QL STRIP: NEGATIVE
NON-SQ EPI CELLS URNS QL MICRO: ABNORMAL /HPF
NONHDLC SERPL-MCNC: 112 MG/DL
NRBC BLD AUTO-RTO: 0 /100 WBCS
PH UR STRIP.AUTO: 6.5 [PH]
PLATELET # BLD AUTO: 261 THOUSANDS/UL (ref 149–390)
PMV BLD AUTO: 12.2 FL (ref 8.9–12.7)
POTASSIUM SERPL-SCNC: 4.1 MMOL/L (ref 3.5–5.3)
PROT SERPL-MCNC: 7.6 G/DL (ref 6.4–8.4)
PROT UR STRIP-MCNC: ABNORMAL MG/DL
RBC # BLD AUTO: 4.76 MILLION/UL (ref 3.88–5.62)
RBC #/AREA URNS AUTO: ABNORMAL /HPF
SODIUM SERPL-SCNC: 132 MMOL/L (ref 135–147)
SP GR UR STRIP.AUTO: 1.01 (ref 1–1.03)
TRIGL SERPL-MCNC: 145 MG/DL (ref ?–150)
URATE SERPL-MCNC: 4.4 MG/DL (ref 3.5–8.5)
UROBILINOGEN UR STRIP-ACNC: <2 MG/DL
WBC # BLD AUTO: 7.07 THOUSAND/UL (ref 4.31–10.16)
WBC #/AREA URNS AUTO: ABNORMAL /HPF

## 2025-06-03 PROCEDURE — 85025 COMPLETE CBC W/AUTO DIFF WBC: CPT

## 2025-06-03 PROCEDURE — 80053 COMPREHEN METABOLIC PANEL: CPT

## 2025-06-03 PROCEDURE — 82043 UR ALBUMIN QUANTITATIVE: CPT

## 2025-06-03 PROCEDURE — 80061 LIPID PANEL: CPT

## 2025-06-03 PROCEDURE — 81001 URINALYSIS AUTO W/SCOPE: CPT

## 2025-06-03 PROCEDURE — 36415 COLL VENOUS BLD VENIPUNCTURE: CPT

## 2025-06-03 PROCEDURE — 73562 X-RAY EXAM OF KNEE 3: CPT

## 2025-06-03 PROCEDURE — 84550 ASSAY OF BLOOD/URIC ACID: CPT

## 2025-06-03 PROCEDURE — 82570 ASSAY OF URINE CREATININE: CPT

## 2025-06-03 PROCEDURE — 99213 OFFICE O/P EST LOW 20 MIN: CPT | Performed by: ORTHOPAEDIC SURGERY

## 2025-06-03 PROCEDURE — 83036 HEMOGLOBIN GLYCOSYLATED A1C: CPT

## 2025-06-03 PROCEDURE — 83735 ASSAY OF MAGNESIUM: CPT

## 2025-06-03 NOTE — PROGRESS NOTES
Assessment/Plan:  Assessment & Plan   Diagnoses and all orders for this visit:    S/P total knee replacement, left  -     XR knee 3 vw left non injury; Future        The patient is doing quite well from his left total knee replacement.  Strength and motion intact.  No instability.  Incision clean and dry.  Continue home exercise program.  Follow-up in 6 months for evaluation with new x-rays left knee-3 views    Subjective:   Patient ID: Robert Benson is a 65 y.o. male.    HPI    The patient is 6 months status post left total knee replacement.  The patient is very happy with his progress.  He denies any numbness or ting.  He denies any fever or chills.  He walks 3 miles a day.  He is walking in the office without any assistive devices and without an antalgic gait    The following portions of the patient's history were reviewed and updated as appropriate: allergies, current medications, past family history, past medical history, past social history, past surgical history, and problem list.    Review of Systems   Constitutional:  Negative for chills, fever and unexpected weight change.   HENT:  Negative for hearing loss, nosebleeds and sore throat.    Eyes:  Negative for pain, redness and visual disturbance.   Respiratory:  Negative for cough, shortness of breath and wheezing.    Cardiovascular:  Negative for chest pain, palpitations and leg swelling.   Gastrointestinal:  Negative for abdominal pain, nausea and vomiting.   Endocrine: Negative for polydipsia and polyuria.   Genitourinary:  Negative for dysuria and hematuria.   Musculoskeletal:  Negative for arthralgias, back pain, gait problem, joint swelling, myalgias, neck pain and neck stiffness.        As noted in HPI   Skin:  Negative for rash and wound.   Neurological:  Negative for dizziness, numbness and headaches.   Psychiatric/Behavioral:  Negative for decreased concentration and suicidal ideas. The patient is not nervous/anxious.        Objective:  Ortho  Exam    Left lower extremity is neuro vascularly intact.  Toes are pink and mobile.  Compartments are soft.  Incision is clean, dry, and intact.  Negative Homans.  Arc of motion is intact.  There is full range of motion.  No instability    I have personally reviewed pertinent films in PACS.      X-rays show a well-seated left total knee replacement without any loosening

## 2025-06-06 ENCOUNTER — RESULTS FOLLOW-UP (OUTPATIENT)
Dept: NEPHROLOGY | Facility: CLINIC | Age: 65
End: 2025-06-06

## 2025-06-09 ENCOUNTER — HOSPITAL ENCOUNTER (OUTPATIENT)
Dept: CT IMAGING | Facility: HOSPITAL | Age: 65
Discharge: HOME | End: 2025-06-09
Attending: INTERNAL MEDICINE

## 2025-06-09 ENCOUNTER — APPOINTMENT (OUTPATIENT)
Age: 65
End: 2025-06-09
Payer: MEDICARE

## 2025-06-09 DIAGNOSIS — Z87.891 PERSONAL HISTORY OF NICOTINE DEPENDENCE: ICD-10-CM

## 2025-06-09 DIAGNOSIS — D47.2 MGUS (MONOCLONAL GAMMOPATHY OF UNKNOWN SIGNIFICANCE): ICD-10-CM

## 2025-06-09 DIAGNOSIS — R91.1 LUNG NODULE: ICD-10-CM

## 2025-06-09 LAB
IGA SERPL-MCNC: 180 MG/DL (ref 66–433)
IGG SERPL-MCNC: 821 MG/DL (ref 635–1741)
IGM SERPL-MCNC: 1000 MG/DL (ref 45–281)
PROT SERPL-MCNC: 7.4 G/DL (ref 6.4–8.4)
PROT UR-MCNC: 7.6 MG/DL

## 2025-06-09 PROCEDURE — 84155 ASSAY OF PROTEIN SERUM: CPT

## 2025-06-09 PROCEDURE — 83521 IG LIGHT CHAINS FREE EACH: CPT

## 2025-06-09 PROCEDURE — 36415 COLL VENOUS BLD VENIPUNCTURE: CPT

## 2025-06-09 PROCEDURE — 84156 ASSAY OF PROTEIN URINE: CPT

## 2025-06-09 PROCEDURE — 84165 PROTEIN E-PHORESIS SERUM: CPT

## 2025-06-09 PROCEDURE — 82784 ASSAY IGA/IGD/IGG/IGM EACH: CPT

## 2025-06-09 PROCEDURE — 82232 ASSAY OF BETA-2 PROTEIN: CPT

## 2025-06-09 PROCEDURE — 84166 PROTEIN E-PHORESIS/URINE/CSF: CPT

## 2025-06-09 PROCEDURE — 86334 IMMUNOFIX E-PHORESIS SERUM: CPT

## 2025-06-10 LAB
ALBUMIN SERPL ELPH-MCNC: 4.15 G/DL (ref 3.2–5.1)
ALBUMIN SERPL ELPH-MCNC: 58.5 % (ref 48–70)
ALBUMIN UR ELPH-MCNC: 100 %
ALPHA1 GLOB MFR UR ELPH: 0 %
ALPHA1 GLOB SERPL ELPH-MCNC: 0.27 G/DL (ref 0.15–0.47)
ALPHA1 GLOB SERPL ELPH-MCNC: 3.8 % (ref 1.8–7)
ALPHA2 GLOB MFR UR ELPH: 0 %
ALPHA2 GLOB SERPL ELPH-MCNC: 0.56 G/DL (ref 0.42–1.04)
ALPHA2 GLOB SERPL ELPH-MCNC: 7.9 % (ref 5.9–14.9)
B-GLOBULIN MFR UR ELPH: 0 %
BETA GLOB ABNORMAL SERPL ELPH-MCNC: 0.51 G/DL (ref 0.31–0.57)
BETA1 GLOB SERPL ELPH-MCNC: 7.2 % (ref 4.7–7.7)
BETA2 GLOB SERPL ELPH-MCNC: 6.6 % (ref 3.1–7.9)
BETA2+GAMMA GLOB SERPL ELPH-MCNC: 0.47 G/DL (ref 0.2–0.58)
GAMMA GLOB ABNORMAL SERPL ELPH-MCNC: 1.14 G/DL (ref 0.4–1.66)
GAMMA GLOB MFR UR ELPH: 0 %
GAMMA GLOB SERPL ELPH-MCNC: 16 % (ref 6.9–22.3)
IGG/ALB SER: 1.41 {RATIO} (ref 1.1–1.8)
KAPPA LC FREE SER-MCNC: 50.9 MG/L (ref 3.3–19.4)
KAPPA LC FREE/LAMBDA FREE SER: 1.98 {RATIO} (ref 0.26–1.65)
LAMBDA LC FREE SERPL-MCNC: 25.7 MG/L (ref 5.7–26.3)
M PROTEIN 1 SERPL ELPH-MCNC: 0.31 G/DL
PROT SERPL-MCNC: 7.1 G/DL (ref 6.4–8.4)
PROT UR-MCNC: 7.9 MG/DL

## 2025-06-10 PROCEDURE — 84165 PROTEIN E-PHORESIS SERUM: CPT | Performed by: PATHOLOGY

## 2025-06-10 PROCEDURE — 84166 PROTEIN E-PHORESIS/URINE/CSF: CPT | Performed by: PATHOLOGY

## 2025-06-10 PROCEDURE — 86334 IMMUNOFIX E-PHORESIS SERUM: CPT | Performed by: PATHOLOGY

## 2025-06-11 LAB — B2 MICROGLOB SERPL-MCNC: 2.5 MG/L (ref 0.6–2.4)

## 2025-07-02 ENCOUNTER — OFFICE VISIT (OUTPATIENT)
Dept: HEMATOLOGY ONCOLOGY | Facility: CLINIC | Age: 65
End: 2025-07-02
Payer: MEDICARE

## 2025-07-02 VITALS
HEART RATE: 67 BPM | RESPIRATION RATE: 18 BRPM | BODY MASS INDEX: 30.75 KG/M2 | WEIGHT: 227 LBS | TEMPERATURE: 98.2 F | OXYGEN SATURATION: 96 % | DIASTOLIC BLOOD PRESSURE: 72 MMHG | HEIGHT: 72 IN | SYSTOLIC BLOOD PRESSURE: 122 MMHG

## 2025-07-02 DIAGNOSIS — R91.1 LUNG NODULE: ICD-10-CM

## 2025-07-02 DIAGNOSIS — E66.01 OBESITY, MORBID (HCC): ICD-10-CM

## 2025-07-02 DIAGNOSIS — D47.2 MGUS (MONOCLONAL GAMMOPATHY OF UNKNOWN SIGNIFICANCE): Primary | ICD-10-CM

## 2025-07-02 PROCEDURE — G2211 COMPLEX E/M VISIT ADD ON: HCPCS | Performed by: INTERNAL MEDICINE

## 2025-07-02 PROCEDURE — 99214 OFFICE O/P EST MOD 30 MIN: CPT | Performed by: INTERNAL MEDICINE

## 2025-07-02 NOTE — PROGRESS NOTES
Name: Robert Benson      : 1960      MRN: [id number]  Encounter Provider: Bradford Duvall MD  Encounter Date: 2025   Encounter department: [facility] [place]  :  Assessment & Plan  MGUS (monoclonal gammopathy of unknown significance)  The patient seems to have further increase of his IgM kappa gammopathy.  However, he remains relatively asymptomatic.  We did discuss the usual indication for bone marrow biopsy which can be done anytime in the near future to have better idea about the exact burden of his disease in the bone marrow.    Orders:    CBC and differential; Future    Comprehensive metabolic panel; Future    Magnesium; Future    IgG, IgA, IgM; Future    Immunoglobulin free LT chains blood; Future    Lung nodule  He had low-dose CT scan of the lung on 2025 which was discussed with the patient and showed stable emphysema with no suspicious pulmonary nodules.  We did discuss smoking cessation briefly.       Obesity, morbid (HCC)             Return in about 19 weeks (around 2025) for Office Visit 20 min, Labs.    History of Present Illness   Chief Complaint   Patient presents with    Follow-up   Patient is a pleasant 65-year-old male who originally presented for further evaluation of his abnormal SPEP/serum free light chains: referred by nephrology.  He has past medical history of hypertension, diabetes, achalasia, GERD and chronic hyponatremia.  He does admit to smoking cigars.  Has decent family history of malignancies and a niece who harbors BRCA mutation; he did have genetic testing in the past which came back negative.     He states that around 2023 he presented to the hospital with reports of significant back pain.  The back pain resolved however since that time he has been referred to multiple specialists including urology, surgery nephrology due to incidental findings.  Established care with a new PCP recently as well.  Was found to have the  abnormal SPEP/free light chains with nephrology work-up.     Labs 10/6/2023 showed sodium 133, creatinine 1.22, GFR 66, calcium normal 9.8 remaining metabolic panel is normal.  Urinalysis showed +1 protein.  His serum protein electrophoresis showed a faint band in the gamma region suspicious for monoclonal protein however immunofixation was not performed.  His urine protein electrophoresis was negative for monoclonal protein.  TSH normal 0.53.  Serum free light chain showed elevated kappa 52.7 mg/L, normal lambda 22.4 mg/L with slightly elevated kappa to lambda ratio 2.35.  Most recent CBC available for review 5/9/2023 showed normal white cells and platelets, he was not anemic H&H 13.7/41.2, MCV 86.           Interval history:  Patient came today for follow-up visit accompanied by his wife.  He denied any significant symptoms.  Blood work on 6/9/2025 showed normal CBC with hemoglobin of 12.7.  Creatinine 1.2 with normal calcium and liver enzymes.  Hemoglobin showed further increase of the IgM level up to 1000 mg/dL.  Beta-2 microglobulin was 2.5.  Serum light chain concentration for kappa was 50.9 mg/L with a ratio of 1.9.  UPEP was negative for M protein.  Serum immunofixation showed IgM kappa gammopathy.     Review of Systems   Constitutional:  Negative for chills and fever.   HENT:  Positive for hearing loss. Negative for ear pain and sore throat.    Eyes:  Negative for pain and visual disturbance.   Respiratory:  Positive for cough. Negative for shortness of breath.    Cardiovascular:  Negative for chest pain and palpitations.   Gastrointestinal:  Negative for abdominal pain and vomiting.   Genitourinary:  Negative for dysuria and hematuria.   Musculoskeletal:  Negative for arthralgias and back pain.   Skin:  Negative for color change and rash.   Neurological:  Positive for dizziness and numbness. Negative for seizures and syncope.   All other systems reviewed and are negative.    Medical History Reviewed by  provider this encounter:  Tobacco  Allergies  Meds  Problems  Med Hx  Surg Hx  Fam Hx     .  Medications Ordered Prior to Encounter[1]   Social History[2]      Objective   /72 (BP Location: Left arm, Patient Position: Sitting, Cuff Size: Adult)   Pulse 67   Temp 98.2 °F (36.8 °C)   Resp 18   Ht 6' (1.829 m)   Wt 103 kg (227 lb)   SpO2 96%   BMI 30.79 kg/m²     Pain Screening:  Pain Score: 0-No pain  ECOG   0  Physical Exam  Constitutional:       Appearance: He is well-developed.   HENT:      Head: Normocephalic and atraumatic.      Nose: Nose normal.     Eyes:      General: No scleral icterus.        Right eye: No discharge.         Left eye: No discharge.      Conjunctiva/sclera: Conjunctivae normal.      Pupils: Pupils are equal, round, and reactive to light.     Neck:      Thyroid: No thyromegaly.      Trachea: No tracheal deviation.     Cardiovascular:      Rate and Rhythm: Normal rate and regular rhythm.      Heart sounds: Normal heart sounds. No murmur heard.     No friction rub.   Pulmonary:      Effort: Pulmonary effort is normal. No respiratory distress.      Breath sounds: Normal breath sounds. No wheezing or rales.   Chest:      Chest wall: No tenderness.   Abdominal:      General: Bowel sounds are normal. There is no distension.      Palpations: Abdomen is soft. There is no hepatomegaly or splenomegaly.      Tenderness: There is no abdominal tenderness. There is no guarding or rebound.     Musculoskeletal:         General: No tenderness or deformity. Normal range of motion.      Cervical back: Normal range of motion and neck supple.   Lymphadenopathy:      Cervical: No cervical adenopathy.     Skin:     General: Skin is warm and dry.      Coloration: Skin is not pale.      Findings: No erythema or rash.     Neurological:      Mental Status: He is alert and oriented to person, place, and time.      Cranial Nerves: No cranial nerve deficit.      Coordination: Coordination normal.       Deep Tendon Reflexes: Reflexes are normal and symmetric.     Psychiatric:         Behavior: Behavior normal.         Thought Content: Thought content normal.         Judgment: Judgment normal.       Labs: I have reviewed the following labs:  Lab Results   Component Value Date/Time    WBC 7.07 06/03/2025 10:35 AM    RBC 4.76 06/03/2025 10:35 AM    Hemoglobin 12.7 06/03/2025 10:35 AM    Hematocrit 38.8 06/03/2025 10:35 AM    MCV 82 06/03/2025 10:35 AM    MCH 26.7 (L) 06/03/2025 10:35 AM    RDW 15.2 (H) 06/03/2025 10:35 AM    Platelets 261 06/03/2025 10:35 AM    Segmented % 60 06/03/2025 10:35 AM    Lymphocytes % 28 06/03/2025 10:35 AM    Monocytes % 9 06/03/2025 10:35 AM    Eosinophils Relative 2 06/03/2025 10:35 AM    Basophils Relative 1 06/03/2025 10:35 AM    Immature Grans % 0 06/03/2025 10:35 AM    Absolute Neutrophils 4.25 06/03/2025 10:35 AM     Lab Results   Component Value Date/Time    Potassium 4.1 06/03/2025 10:35 AM    Chloride 99 06/03/2025 10:35 AM    CO2 23 06/03/2025 10:35 AM    BUN 16 06/03/2025 10:35 AM    Creatinine 1.29 06/03/2025 10:35 AM    Glucose, Fasting 104 (H) 06/03/2025 10:35 AM    Calcium 9.5 06/03/2025 10:35 AM    AST 35 06/03/2025 10:35 AM    ALT 36 06/03/2025 10:35 AM    Alkaline Phosphatase 57 06/03/2025 10:35 AM    Total Protein 7.4 06/09/2025 09:25 AM    Total Protein 7.1 06/09/2025 09:25 AM    Albumin 4.5 06/03/2025 10:35 AM    Total Bilirubin 0.67 06/03/2025 10:35 AM    eGFR 57 06/03/2025 10:35 AM     Lab Results   Component Value Date/Time    WBC 7.07 06/03/2025 10:35 AM    RBC 4.76 06/03/2025 10:35 AM    Hemoglobin 12.7 06/03/2025 10:35 AM    Hematocrit 38.8 06/03/2025 10:35 AM    MCV 82 06/03/2025 10:35 AM    MCH 26.7 (L) 06/03/2025 10:35 AM    RDW 15.2 (H) 06/03/2025 10:35 AM    Platelets 261 06/03/2025 10:35 AM    Segmented % 60 06/03/2025 10:35 AM    Lymphocytes % 28 06/03/2025 10:35 AM    Monocytes % 9 06/03/2025 10:35 AM    Eosinophils Relative 2 06/03/2025 10:35 AM     Basophils Relative 1 06/03/2025 10:35 AM    Immature Grans % 0 06/03/2025 10:35 AM    Absolute Neutrophils 4.25 06/03/2025 10:35 AM      Lab Results   Component Value Date/Time    Sodium 132 (L) 06/03/2025 10:35 AM    Potassium 4.1 06/03/2025 10:35 AM    Chloride 99 06/03/2025 10:35 AM    CO2 23 06/03/2025 10:35 AM    ANION GAP 10 06/03/2025 10:35 AM    BUN 16 06/03/2025 10:35 AM    Creatinine 1.29 06/03/2025 10:35 AM    Glucose 124 11/20/2024 05:00 AM    Glucose, Fasting 104 (H) 06/03/2025 10:35 AM    Calcium 9.5 06/03/2025 10:35 AM    AST 35 06/03/2025 10:35 AM    ALT 36 06/03/2025 10:35 AM    Alkaline Phosphatase 57 06/03/2025 10:35 AM    Total Protein 7.4 06/09/2025 09:25 AM    Total Protein 7.1 06/09/2025 09:25 AM    Albumin 4.5 06/03/2025 10:35 AM    Total Bilirubin 0.67 06/03/2025 10:35 AM    eGFR 57 06/03/2025 10:35 AM      Lab Results   Component Value Date/Time    Beta-2 Globulin % 6.6 06/09/2025 09:25 AM    Ig [place] Free Light Chain 50.9 (H) 06/09/2025 09:25 AM    Ig Lambda Free Light Chain 25.7 06/09/2025 09:25 AM    Gamma Globulin % 16.0 06/09/2025 09:25 AM     06/09/2025 09:25 AM     06/09/2025 09:25 AM    IGM 1,000 (H) 06/09/2025 09:25 AM      Lab Results   Component Value Date/Time    Iron 44 (L) 10/17/2024 08:18 AM    Iron Saturation 11 (L) 10/17/2024 08:18 AM    Ferritin 27 10/17/2024 08:18 AM      Results for orders placed or performed in visit on 06/09/25   IgG, IgA, IgM   Result Value Ref Range     66 - 433 mg/dL     635 - 1,741 mg/dL    IGM 1,000 (H) 45 - 281 mg/dL   Beta 2 microglobulin, serum   Result Value Ref Range    Beta-2 Microglobulin 2.5 (H) 0.6 - 2.4 mg/L   Immunoglobulin free LT chains blood   Result Value Ref Range    Ig Kappa Free Light Chain 50.9 (H) 3.3 - 19.4 mg/L    Ig Lambda Free Light Chain 25.7 5.7 - 26.3 mg/L    Kappa/Lambda FluidC Ratio 1.98 (H) 0.26 - 1.65   Protein electrophoresis, urine   Result Value Ref Range    Total Protein, Urine 7.9  mg/dL    Albumin ELP, Urine 100.0 %    Alpha-1 Globulin, Urine % 0.0 %    Alpha-2 Globulin, Urine % 0.0 %    Beta, Urine 0.0 %    Gamma Globulin, Urine 0.0 %   Protein, total   Result Value Ref Range    Total Protein 7.4 6.4 - 8.4 g/dL   Protein electrophoresis, serum   Result Value Ref Range    A/G Ratio 1.41 1.10 - 1.80    Albumin % 58.5 48.0 - 70.0 %    Albumin 4.15 3.20 - 5.10 g/dl    Alpha-1 Globulin % 3.8 1.8 - 7.0 %    Alpha-1 Globulin 0.27 0.15 - 0.47 g/dL    Alpha-2 Globulin % 7.9 5.9 - 14.9 %    Alpha-2 Globulin 0.56 0.42 - 1.04 g/dL    Beta-1 Globulin % 7.2 4.7 - 7.7 %    Beta-1 Globulin 0.51 0.31 - 0.57 g/dL    Beta-2 Globulin % 6.6 3.1 - 7.9 %    Beta-2 Globulin 0.47 0.20 - 0.58 g/dL    Gamma Globulin % 16.0 6.9 - 22.3 %    Gamma Globulin 1.14 0.40 - 1.66 g/dL    M-Yon 0.31 g/dL    Total Protein 7.1 6.4 - 8.4 g/dL   Protein, urine, random   Result Value Ref Range    Protein Urine Random 7.6 Reference range not established. mg/dL   Path Interpretation, Serum Protein Electrophoresis   Result Value Ref Range    Case Report       Electrophoresis Case                              Case: [id number]                                   Authorizing Provider:  Bradford Duvall MD            Collected:           06/09/2025 0925              Ordering Location:     [facility]      Received:            06/09/2025 1937                                     Services - [facility]                                                        Pathologist:           Nancy Romero MD                                                                  Specimen:    Arm, Left                                                                                  SPEP Interpretation       The SPEP shows a monoclonal peak in the gamma region. Previous immunofixation on 05/03/2024 identified the monoclonal peak as IgM kappa. Repeat immunofixation to be performed.       SPEP Immunofixation Interpretation       Serum immunofixation shows a monoclonal  gammopathy identified as IgM kappa.      Path Interpretation, Urine Protein Electrophoresis   Result Value Ref Range    Case Report       Electrophoresis Case                              Case: [id number]                                   Authorizing Provider:  Bradford Duvall MD            Collected:           06/09/2025 0960              Ordering Location:     [facility]      Received:            06/10/2025 0808                                     Services - [facility]                                                        Pathologist:           Nancy Romero MD                                                                  Specimen:    Urine, Other                                                                               UPEP Interpretation       No monoclonal bands noted.       Clinical Information                     [1]   Current Outpatient Medications on File Prior to Visit   Medication Sig Dispense Refill    acetaminophen (TYLENOL) 325 mg tablet Take 2 tablets (650 mg total) by mouth every 4 (four) hours as needed for mild pain (fever > 100.5)      atorvastatin (LIPITOR) 40 mg tablet Take 1 tablet (40 mg total) by mouth every evening 90 tablet 1    Empagliflozin (Jardiance) 10 MG TABS tablet Take 1 tablet (10 mg total) by mouth daily 90 tablet 1    esomeprazole (NexIUM) 40 MG capsule Take 1 capsule (40 mg total) by mouth 2 (two) times a day before meals 180 capsule 1    fenofibrate (TRICOR) 145 mg tablet Take 1 tablet (145 mg total) by mouth daily 100 tablet 1    ferrous sulfate 324 (65 Fe) mg TAKE 1 TABLET BY MOUTH 2 TIMES A DAY BEFORE MEALS DO NOT START BEFORE OCTOBER 17, 2024. 60 tablet 5    lisinopril (ZESTRIL) 5 mg tablet Take 1 tablet (5 mg total) by mouth daily 90 tablet 1    metFORMIN (GLUCOPHAGE) 500 mg tablet Take 1 tablet (500 mg total) by mouth daily with breakfast 90 tablet 3    sodium chloride 1 g tablet TAKE 1 TABLET (1 G TOTAL) BY MOUTH EVERY OTHER DAY 45 tablet 1    amoxicillin  (AMOXIL) 500 mg capsule Take 2,000 mg by mouth as needed 1 hour prior to dental appointment (Patient not taking: Reported on 10/15/2024)      ascorbic acid (VITAMIN C) 500 MG tablet Take 1 tablet (500 mg total) by mouth 2 (two) times a day Do not start before October 17, 2024. (Patient not taking: Reported on 7/2/2025) 60 tablet 1    folic acid (FOLVITE) 1 mg tablet Take 1 tablet (1 mg total) by mouth daily Do not start before October 17, 2024. 30 tablet 1    Multiple Vitamins-Minerals (multivitamin with minerals) tablet Take 1 tablet by mouth daily Do not start before October 17, 2024. 30 tablet 1    nicotine polacrilex (Nicotine Mini) 2 MG lozenge Apply 1 lozenge (2 mg total) to the mouth or throat as needed for smoking cessation (Patient not taking: Reported on 6/3/2025) 81 lozenge 5     No current facility-administered medications on file prior to visit.   [2]   Social History  Tobacco Use    Smoking status: Some Days     Types: Cigars     Passive exposure: Past    Smokeless tobacco: Never    Tobacco comments:     Cigars 5/weeks   Vaping Use    Vaping status: Never Used   Substance and Sexual Activity    Alcohol use: Not Currently     Comment: rarely    Drug use: Never    Sexual activity: Not Currently

## 2025-07-29 ENCOUNTER — APPOINTMENT (OUTPATIENT)
Dept: RADIOLOGY | Facility: CLINIC | Age: 65
End: 2025-07-29
Payer: MEDICARE

## 2025-07-29 ENCOUNTER — APPOINTMENT (OUTPATIENT)
Dept: RADIOLOGY | Facility: CLINIC | Age: 65
End: 2025-07-29
Attending: STUDENT IN AN ORGANIZED HEALTH CARE EDUCATION/TRAINING PROGRAM
Payer: MEDICARE

## 2025-07-29 ENCOUNTER — OFFICE VISIT (OUTPATIENT)
Dept: FAMILY MEDICINE CLINIC | Facility: CLINIC | Age: 65
End: 2025-07-29
Payer: MEDICARE

## 2025-07-29 VITALS
SYSTOLIC BLOOD PRESSURE: 138 MMHG | BODY MASS INDEX: 32.64 KG/M2 | WEIGHT: 228 LBS | OXYGEN SATURATION: 97 % | TEMPERATURE: 98.6 F | DIASTOLIC BLOOD PRESSURE: 78 MMHG | HEIGHT: 70 IN | HEART RATE: 74 BPM

## 2025-07-29 DIAGNOSIS — G47.30 SLEEP APNEA, UNSPECIFIED TYPE: ICD-10-CM

## 2025-07-29 DIAGNOSIS — L84 CORN: ICD-10-CM

## 2025-07-29 DIAGNOSIS — J44.9 CHRONIC OBSTRUCTIVE PULMONARY DISEASE, UNSPECIFIED COPD TYPE (HCC): ICD-10-CM

## 2025-07-29 DIAGNOSIS — G62.9 PERIPHERAL POLYNEUROPATHY: ICD-10-CM

## 2025-07-29 DIAGNOSIS — I10 ESSENTIAL HYPERTENSION: ICD-10-CM

## 2025-07-29 DIAGNOSIS — E87.1 HYPONATREMIA: ICD-10-CM

## 2025-07-29 DIAGNOSIS — F17.298 OTHER TOBACCO PRODUCT NICOTINE DEPENDENCE WITH OTHER NICOTINE-INDUCED DISORDER: ICD-10-CM

## 2025-07-29 DIAGNOSIS — K22.0 ACHALASIA: ICD-10-CM

## 2025-07-29 DIAGNOSIS — R80.9 TYPE 2 DIABETES MELLITUS WITH MICROALBUMINURIA, WITHOUT LONG-TERM CURRENT USE OF INSULIN (HCC): ICD-10-CM

## 2025-07-29 DIAGNOSIS — E78.5 DYSLIPIDEMIA: ICD-10-CM

## 2025-07-29 DIAGNOSIS — E11.29 TYPE 2 DIABETES MELLITUS WITH MICROALBUMINURIA, WITHOUT LONG-TERM CURRENT USE OF INSULIN (HCC): ICD-10-CM

## 2025-07-29 DIAGNOSIS — E08.40 DIABETES MELLITUS DUE TO UNDERLYING CONDITION WITH DIABETIC NEUROPATHY, WITHOUT LONG-TERM CURRENT USE OF INSULIN (HCC): Primary | ICD-10-CM

## 2025-07-29 DIAGNOSIS — K21.9 GASTROESOPHAGEAL REFLUX DISEASE, UNSPECIFIED WHETHER ESOPHAGITIS PRESENT: ICD-10-CM

## 2025-07-29 DIAGNOSIS — Z76.89 ESTABLISHING CARE WITH NEW DOCTOR, ENCOUNTER FOR: ICD-10-CM

## 2025-07-29 DIAGNOSIS — L57.0 ACTINIC KERATOSIS: ICD-10-CM

## 2025-07-29 DIAGNOSIS — I10 HTN (HYPERTENSION): ICD-10-CM

## 2025-07-29 DIAGNOSIS — L30.8 OTHER ECZEMA: ICD-10-CM

## 2025-07-29 DIAGNOSIS — D47.2 MGUS (MONOCLONAL GAMMOPATHY OF UNKNOWN SIGNIFICANCE): ICD-10-CM

## 2025-07-29 PROBLEM — I48.0 PAROXYSMAL A-FIB (HCC): Status: RESOLVED | Noted: 2023-05-15 | Resolved: 2025-07-29

## 2025-07-29 LAB
LEFT EYE DIABETIC RETINOPATHY: ABNORMAL
LEFT EYE IMAGE QUALITY: ABNORMAL
LEFT EYE MACULAR EDEMA: ABNORMAL
LEFT EYE OTHER RETINOPATHY: ABNORMAL
RIGHT EYE DIABETIC RETINOPATHY: ABNORMAL
RIGHT EYE IMAGE QUALITY: ABNORMAL
RIGHT EYE MACULAR EDEMA: ABNORMAL
RIGHT EYE OTHER RETINOPATHY: ABNORMAL
SEVERITY (EYE EXAM): ABNORMAL

## 2025-07-29 PROCEDURE — 99215 OFFICE O/P EST HI 40 MIN: CPT | Performed by: STUDENT IN AN ORGANIZED HEALTH CARE EDUCATION/TRAINING PROGRAM

## 2025-07-29 PROCEDURE — 72114 X-RAY EXAM L-S SPINE BENDING: CPT

## 2025-07-29 PROCEDURE — 11306 SHAVE SKIN LESION 0.6-1.0 CM: CPT | Performed by: STUDENT IN AN ORGANIZED HEALTH CARE EDUCATION/TRAINING PROGRAM

## 2025-07-29 RX ORDER — VARENICLINE TARTRATE 0.5 (11)-1
KIT ORAL
Qty: 53 EACH | Refills: 0 | Status: SHIPPED | OUTPATIENT
Start: 2025-07-29

## 2025-07-29 RX ORDER — ESOMEPRAZOLE MAGNESIUM 40 MG/1
40 CAPSULE, DELAYED RELEASE ORAL
Qty: 180 CAPSULE | Refills: 1 | Status: SHIPPED | OUTPATIENT
Start: 2025-07-29

## 2025-07-29 RX ORDER — TRIAMCINOLONE ACETONIDE 1 MG/G
CREAM TOPICAL 2 TIMES DAILY
Qty: 45 G | Refills: 0 | Status: SHIPPED | OUTPATIENT
Start: 2025-07-29

## 2025-07-31 DIAGNOSIS — E78.5 DYSLIPIDEMIA: ICD-10-CM

## 2025-07-31 RX ORDER — ATORVASTATIN CALCIUM 40 MG/1
40 TABLET, FILM COATED ORAL EVERY EVENING
Qty: 90 TABLET | Refills: 0 | Status: SHIPPED | OUTPATIENT
Start: 2025-07-31

## 2025-07-31 RX ORDER — LISINOPRIL 5 MG/1
5 TABLET ORAL DAILY
Qty: 90 TABLET | Refills: 0 | Status: SHIPPED | OUTPATIENT
Start: 2025-07-31

## 2025-07-31 RX ORDER — FENOFIBRATE 145 MG/1
145 TABLET, FILM COATED ORAL DAILY
Qty: 100 TABLET | Refills: 0 | Status: SHIPPED | OUTPATIENT
Start: 2025-07-31

## 2025-07-31 RX ORDER — FENOFIBRATE 145 MG/1
145 TABLET, FILM COATED ORAL DAILY
Qty: 90 TABLET | Refills: 2 | OUTPATIENT
Start: 2025-07-31

## 2025-08-18 DIAGNOSIS — F17.298 OTHER TOBACCO PRODUCT NICOTINE DEPENDENCE WITH OTHER NICOTINE-INDUCED DISORDER: ICD-10-CM

## 2025-08-20 RX ORDER — VARENICLINE TARTRATE 0.5 (11)-1
KIT ORAL
Qty: 53 EACH | Refills: 0 | Status: SHIPPED | OUTPATIENT
Start: 2025-08-20

## (undated) DEVICE — INTENDED FOR TISSUE SEPARATION, AND OTHER PROCEDURES THAT REQUIRE A SHARP SURGICAL BLADE TO PUNCTURE OR CUT.: Brand: BARD-PARKER ® CARBON RIB-BACK BLADES

## (undated) DEVICE — 3M™ STERI-DRAPE™ U-DRAPE 1015: Brand: STERI-DRAPE™

## (undated) DEVICE — READY WET SKIN SCRUB TRAY-LF: Brand: MEDLINE INDUSTRIES, INC.

## (undated) DEVICE — NO-SCRATCH ™ SMALL WHITNEY CURETTE ™ IS A SINGLE-USE, PLASTIC CURETTE FOR QUICKLY APPLYING, MANIPULATING AND REMOVING BONE CEMENT DURING HIP AND KNEE REPLACEMENT SURGERY. THE PLASTIC IS SOFTER THAN STEEL INSTRUMENTS, REDUCING THE RISK OF DAMAGING THE PROSTHESIS WITH METAL INSTRUMENTS.  THE CURETTE’S 6MM TIP REMOVES EXCESS CEMENT FROM REPLACEMENT HIPS AND KNEES. EASY-TO-MANEUVER, THE SMALL BLUE CURETTE LETS YOU REMOVE CEMENT FROM ALL EDGES OF THE PROSTHESIS.NO-SCRATCH WHITNEY SMALL CURETTE FEATURES:SAFER THAN STEEL- MADE OF PLASTIC - STURDY YET SOFTER THAN SURGICAL STEEL.HANDIER- EACH TOOL HAS A MOLDED-IN THUMB INDENTATION INSTANTLY ORIENTING THE TOOL.- EASIER TO MANEUVER IN HARD TO SEE PLACES.- COLOR-CODED FOR EASY IDENTIFICATION.FASTER- COMES INDIVIDUALLY PACKAGED IN STERILE, PEEL OPEN POUCH, READY TO GO.- APPLIES, MANIPULATES, OR REMOVES CEMENT WITH FINGERTIP PRECISION.ECONOMICAL- THE COST OF A SINGLE REVISION DWARFS THE COST OF A SINGLE-USE CURETTE. - DISPOSABLE – THERE’S NO NEED TO WASTE TIME REMOVING HARDENED CEMENT OR RE-STERILIZING TOOLS.- LESS EXPENSIVE TO BUY AND INVENTORY - ORDER ONLY THE TOOL YOU USE.- PACKAGED 25 INDIVIDUALLY WRAPPED TOOLS TO A CARTON FOR CONVENIENT SHELF STORAGE.: Brand: WHITNEY NO-SCRATCH CURETTE (SMALL)

## (undated) DEVICE — Device

## (undated) DEVICE — BASIC SINGLE BASIN 2-LF: Brand: MEDLINE INDUSTRIES, INC.

## (undated) DEVICE — BLADE OSCILLATOR 86.0 X 19.5MM

## (undated) DEVICE — ACE WRAP 6 IN XL STERILE

## (undated) DEVICE — OCCLUSIVE GAUZE STRIP,3% BISMUTH TRIBROMOPHENATE IN PETROLATUM BLEND: Brand: XEROFORM

## (undated) DEVICE — BETHLEHEM UNIV TOTAL KNEE, KIT: Brand: CARDINAL HEALTH

## (undated) DEVICE — SUT VICRYL 1 CP 27 IN J196H

## (undated) DEVICE — SKIN MARKER DUAL TIP WITH RULER CAP, FLEXIBLE RULER AND LABELS: Brand: DEVON

## (undated) DEVICE — BLADE SAW RECIP 12.5 X 76MM 0.9/0.9MM THCK

## (undated) DEVICE — STRAP LITHOTOMY CANDY CANE

## (undated) DEVICE — 3M™ DURAPORE™ SURGICAL TAPE 1538-3, 3 INCH X 10 YARD (7,5CM X 9,1M), 4 ROLLS/BOX: Brand: 3M™ DURAPORE™

## (undated) DEVICE — BLADE REAMER PATELLA 51MM

## (undated) DEVICE — MEDI-VAC YANK SUCT HNDL W/TPRD BULBOUS TIP: Brand: CARDINAL HEALTH

## (undated) DEVICE — HOOD WITH PEEL AWAY FACE SHIELD: Brand: T7PLUS

## (undated) DEVICE — COBAN 6 IN STERILE

## (undated) DEVICE — ACE WRAP 6 IN STERILE

## (undated) DEVICE — CEMENT BOWL VACUUM MIXING

## (undated) DEVICE — PADDING CAST 6IN COTTON STRL

## (undated) DEVICE — ASTOUND FABRIC REINFORCED SURGICAL GOWN: Brand: CONVERTORS

## (undated) DEVICE — GLOVE SRG BIOGEL 7.5

## (undated) DEVICE — PREP SURGICAL PURPREP 26ML

## (undated) DEVICE — BAG WOUND LAVAGE 1L BIASURGE ADV

## (undated) DEVICE — NEPTUNE E-SEP SMOKE EVACUATION PENCIL, COATED, 70MM BLADE, PUSH BUTTON SWITCH: Brand: NEPTUNE E-SEP

## (undated) DEVICE — Device: Brand: PULSAVAC®

## (undated) DEVICE — 3M™ IOBAN™ 2 ANTIMICROBIAL INCISE DRAPE 6651EZ: Brand: IOBAN™ 2

## (undated) DEVICE — GLOVE SRG BIOGEL 8

## (undated) DEVICE — STOCKINETTE,IMPERVIOUS,12X48,STERILE: Brand: MEDLINE

## (undated) DEVICE — GAUZE SPONGES,16 PLY: Brand: CURITY

## (undated) DEVICE — GLOVE INDICATOR PI UNDERGLOVE SZ 7.5 BLUE

## (undated) DEVICE — FRAZIER SUCTION INSTRUMENT 18 FR W/OBTURATOR, NO CONTROL VENT: Brand: FRAZIER

## (undated) DEVICE — SUT VICRYL 0 CP-1 27 IN J267H

## (undated) DEVICE — TUBING SUCTION 5MM X 12 FT

## (undated) DEVICE — ABDOMINAL PAD: Brand: DERMACEA

## (undated) DEVICE — HOOD: Brand: T7PLUS

## (undated) DEVICE — SUT VICRYL 2-0 CP-1 27 IN J266H

## (undated) DEVICE — JP 3-SPRING RES W/19FR PVC DRAIN/TR: Brand: CARDINAL HEALTH

## (undated) DEVICE — PROXIMATE SKIN STAPLERS (35 WIDE) CONTAINS 35 STAINLESS STEEL STAPLES (FIXED HEAD): Brand: PROXIMATE

## (undated) DEVICE — CAPIT KNEE CEM FEM/ CEM TIBIA/STD SURF/STD PAT-ZIMMER

## (undated) DEVICE — DISPOSABLE OR TOWEL: Brand: CARDINAL HEALTH

## (undated) DEVICE — GLOVE INDICATOR PI UNDERGLOVE SZ 8 BLUE

## (undated) DEVICE — 4-PORT MANIFOLD: Brand: NEPTUNE 2